# Patient Record
Sex: FEMALE | Race: WHITE | NOT HISPANIC OR LATINO | Employment: PART TIME | URBAN - METROPOLITAN AREA
[De-identification: names, ages, dates, MRNs, and addresses within clinical notes are randomized per-mention and may not be internally consistent; named-entity substitution may affect disease eponyms.]

---

## 2017-10-23 DIAGNOSIS — Z01.812 PRE-OPERATIVE LABORATORY EXAMINATION: ICD-10-CM

## 2017-10-23 LAB
ANION GAP SERPL CALC-SCNC: 12 MMOL/L (ref 0–11.9)
BUN SERPL-MCNC: 18 MG/DL (ref 8–22)
CALCIUM SERPL-MCNC: 9.9 MG/DL (ref 8.5–10.5)
CHLORIDE SERPL-SCNC: 100 MMOL/L (ref 96–112)
CO2 SERPL-SCNC: 25 MMOL/L (ref 20–33)
CREAT SERPL-MCNC: 0.7 MG/DL (ref 0.5–1.4)
EST. AVERAGE GLUCOSE BLD GHB EST-MCNC: 200 MG/DL
GFR SERPL CREATININE-BSD FRML MDRD: >60 ML/MIN/1.73 M 2
GLUCOSE SERPL-MCNC: 196 MG/DL (ref 65–99)
HBA1C MFR BLD: 8.6 % (ref 0–5.6)
POTASSIUM SERPL-SCNC: 3.9 MMOL/L (ref 3.6–5.5)
SODIUM SERPL-SCNC: 137 MMOL/L (ref 135–145)

## 2017-10-23 PROCEDURE — 83036 HEMOGLOBIN GLYCOSYLATED A1C: CPT

## 2017-10-23 PROCEDURE — 80048 BASIC METABOLIC PNL TOTAL CA: CPT

## 2017-10-23 PROCEDURE — 36415 COLL VENOUS BLD VENIPUNCTURE: CPT

## 2017-10-23 RX ORDER — METFORMIN HYDROCHLORIDE 500 MG/1
500 TABLET, EXTENDED RELEASE ORAL 2 TIMES DAILY
Status: ON HOLD | COMMUNITY
End: 2017-10-24

## 2017-10-23 RX ORDER — HYDROXYZINE PAMOATE 50 MG/1
50 CAPSULE ORAL 3 TIMES DAILY PRN
COMMUNITY
End: 2017-11-07

## 2017-10-23 RX ORDER — HYDROCODONE BITARTRATE AND ACETAMINOPHEN 5; 325 MG/1; MG/1
1-2 TABLET ORAL EVERY 4 HOURS PRN
COMMUNITY
End: 2018-05-31

## 2017-10-23 RX ORDER — DOCUSATE SODIUM 100 MG/1
100 CAPSULE, LIQUID FILLED ORAL PRN
COMMUNITY
End: 2018-09-11

## 2017-10-23 NOTE — DISCHARGE PLANNING
DISCHARGE PLANNING NOTE      Procedure: Procedure(s):  FOOT ORIF- FIFTH METATARSAL , STRESS EXAM FOR LISFRAC UNDER ANESTHESIA    Procedure Date: 10/24/2017  Insurance:  Payor: MEDICARE / Plan: MEDICARE PART A & B  Equipment currently available at home? cane and front-wheel walker  Steps into the home? 2-3  Steps within the home? 0  Toilet height? Standard  Type of shower? tub-shower with grab bars  Who will be with you during your recovery? daughter  Is Outpatient Physical Therapy set up after surgery? No      Plan: There are no anticipated discharge needs.

## 2017-10-24 ENCOUNTER — HOSPITAL ENCOUNTER (OUTPATIENT)
Facility: MEDICAL CENTER | Age: 72
End: 2017-10-24
Attending: ORTHOPAEDIC SURGERY | Admitting: ORTHOPAEDIC SURGERY
Payer: MEDICARE

## 2017-10-24 ENCOUNTER — APPOINTMENT (OUTPATIENT)
Dept: RADIOLOGY | Facility: MEDICAL CENTER | Age: 72
End: 2017-10-24
Attending: ORTHOPAEDIC SURGERY
Payer: MEDICARE

## 2017-10-24 VITALS
WEIGHT: 231.04 LBS | SYSTOLIC BLOOD PRESSURE: 140 MMHG | OXYGEN SATURATION: 93 % | DIASTOLIC BLOOD PRESSURE: 71 MMHG | HEIGHT: 70 IN | HEART RATE: 73 BPM | TEMPERATURE: 99.3 F | RESPIRATION RATE: 17 BRPM | BODY MASS INDEX: 33.08 KG/M2

## 2017-10-24 PROBLEM — S92.354A CLOSED NONDISPLACED FRACTURE OF FIFTH RIGHT METATARSAL BONE: Status: ACTIVE | Noted: 2017-10-24

## 2017-10-24 LAB
EKG IMPRESSION: NORMAL
GLUCOSE BLD-MCNC: 176 MG/DL (ref 65–99)

## 2017-10-24 PROCEDURE — 93005 ELECTROCARDIOGRAM TRACING: CPT | Performed by: ORTHOPAEDIC SURGERY

## 2017-10-24 PROCEDURE — 82962 GLUCOSE BLOOD TEST: CPT

## 2017-10-24 PROCEDURE — 700111 HCHG RX REV CODE 636 W/ 250 OVERRIDE (IP)

## 2017-10-24 PROCEDURE — 93010 ELECTROCARDIOGRAM REPORT: CPT | Performed by: INTERNAL MEDICINE

## 2017-10-24 PROCEDURE — 700101 HCHG RX REV CODE 250

## 2017-10-24 RX ORDER — LIDOCAINE AND PRILOCAINE 25; 25 MG/G; MG/G
1 CREAM TOPICAL
Status: DISCONTINUED | OUTPATIENT
Start: 2017-10-24 | End: 2017-10-24 | Stop reason: HOSPADM

## 2017-10-24 RX ORDER — SODIUM CHLORIDE 9 MG/ML
INJECTION, SOLUTION INTRAVENOUS CONTINUOUS
Status: DISCONTINUED | OUTPATIENT
Start: 2017-10-24 | End: 2017-10-24 | Stop reason: HOSPADM

## 2017-10-24 RX ORDER — LIDOCAINE HYDROCHLORIDE 10 MG/ML
0.5 INJECTION, SOLUTION INFILTRATION; PERINEURAL
Status: DISCONTINUED | OUTPATIENT
Start: 2017-10-24 | End: 2017-10-24 | Stop reason: HOSPADM

## 2017-10-24 RX ADMIN — SODIUM CHLORIDE: 9 INJECTION, SOLUTION INTRAVENOUS at 11:08

## 2017-10-24 ASSESSMENT — PAIN SCALES - GENERAL: PAINLEVEL_OUTOF10: 0

## 2017-10-24 NOTE — PROGRESS NOTES
Family elected to delay case for cardiology workup.  DC orders placed, will follow up in my clinic next week.    Curtis Pacheco MD

## 2017-10-24 NOTE — OR NURSING
Pt discharged via personal wheelchair.  All other belongings with pt. Pt/family verbalize understanding.

## 2017-11-02 ENCOUNTER — TELEPHONE (OUTPATIENT)
Dept: CARDIOLOGY | Facility: MEDICAL CENTER | Age: 72
End: 2017-11-02

## 2017-11-02 NOTE — TELEPHONE ENCOUNTER
Spoke with patient; no recent cardiac testing; never seen a cardiologist previously; gave instructions to get to our office as she is coming in from [place]; appt 11/7 with Dr. Araiza, patient thankful for the phone call.

## 2017-11-07 ENCOUNTER — APPOINTMENT (OUTPATIENT)
Dept: MEDICAL GROUP | Facility: MEDICAL CENTER | Age: 72
End: 2017-11-07
Payer: MEDICARE

## 2017-11-07 ENCOUNTER — OFFICE VISIT (OUTPATIENT)
Dept: MEDICAL GROUP | Facility: MEDICAL CENTER | Age: 72
End: 2017-11-07
Payer: MEDICARE

## 2017-11-07 ENCOUNTER — OFFICE VISIT (OUTPATIENT)
Dept: CARDIOLOGY | Facility: MEDICAL CENTER | Age: 72
End: 2017-11-07
Payer: MEDICARE

## 2017-11-07 VITALS
OXYGEN SATURATION: 90 % | WEIGHT: 221 LBS | DIASTOLIC BLOOD PRESSURE: 60 MMHG | HEART RATE: 84 BPM | TEMPERATURE: 98.8 F | SYSTOLIC BLOOD PRESSURE: 98 MMHG | BODY MASS INDEX: 32.73 KG/M2 | HEIGHT: 69 IN | RESPIRATION RATE: 20 BRPM

## 2017-11-07 VITALS
HEIGHT: 69 IN | BODY MASS INDEX: 33.03 KG/M2 | OXYGEN SATURATION: 96 % | HEART RATE: 74 BPM | WEIGHT: 223 LBS | DIASTOLIC BLOOD PRESSURE: 72 MMHG | SYSTOLIC BLOOD PRESSURE: 126 MMHG

## 2017-11-07 DIAGNOSIS — E66.9 OBESITY (BMI 30-39.9): ICD-10-CM

## 2017-11-07 DIAGNOSIS — Z76.89 ESTABLISHING CARE WITH NEW DOCTOR, ENCOUNTER FOR: ICD-10-CM

## 2017-11-07 DIAGNOSIS — E78.5 DYSLIPIDEMIA: ICD-10-CM

## 2017-11-07 DIAGNOSIS — E66.9 DIABETES MELLITUS TYPE 2 IN OBESE (HCC): ICD-10-CM

## 2017-11-07 DIAGNOSIS — R94.31 ABNORMAL EKG: ICD-10-CM

## 2017-11-07 DIAGNOSIS — E11.69 DIABETES MELLITUS TYPE 2 IN OBESE (HCC): ICD-10-CM

## 2017-11-07 DIAGNOSIS — K21.9 GASTROESOPHAGEAL REFLUX DISEASE WITHOUT ESOPHAGITIS: ICD-10-CM

## 2017-11-07 PROCEDURE — 99203 OFFICE O/P NEW LOW 30 MIN: CPT | Performed by: INTERNAL MEDICINE

## 2017-11-07 PROCEDURE — 99204 OFFICE O/P NEW MOD 45 MIN: CPT | Performed by: INTERNAL MEDICINE

## 2017-11-07 ASSESSMENT — ENCOUNTER SYMPTOMS
FEVER: 0
FALLS: 1
DEPRESSION: 0
WHEEZING: 0
MYALGIAS: 0
BLURRED VISION: 0
EYE PAIN: 0
EYE DISCHARGE: 0
HEMOPTYSIS: 0
CHILLS: 0
VOMITING: 0
NERVOUS/ANXIOUS: 0
HEARTBURN: 1
BACK PAIN: 1
LOSS OF CONSCIOUSNESS: 0
SPEECH CHANGE: 0
PALPITATIONS: 0
NAUSEA: 0
COUGH: 1
BRUISES/BLEEDS EASILY: 0
ABDOMINAL PAIN: 0

## 2017-11-07 ASSESSMENT — PAIN SCALES - GENERAL: PAINLEVEL: NO PAIN

## 2017-11-07 ASSESSMENT — PATIENT HEALTH QUESTIONNAIRE - PHQ9: CLINICAL INTERPRETATION OF PHQ2 SCORE: 0

## 2017-11-07 NOTE — LETTER
[facility]  Nando Moyer M.D.  [address]  [facility] [address]  Fax: [phone]   Authorization for Release/Disclosure of   Protected Health Information   Name: NANDO PALM : 1945 SSN: [id number]   Address: [address] [place] [id number] Phone:    [phone] (home)    I authorize the entity listed below to release/disclose the PHI below to:   [facility]/Nando Moyer M.D. and Nando Moyer M.D.   Provider or Entity Name:     Address   City, State, Zip   Phone:      Fax:     Reason for request: continuity of care   Information to be released:    [  ] LAST COLONOSCOPY,  including any PATH REPORT and follow-up  [  ] LAST FIT/COLOGUARD RESULT [  ] LAST DEXA  [  ] LAST MAMMOGRAM  [  ] LAST PAP  [  ] LAST LABS [  ] RETINA EXAM REPORT  [  ] IMMUNIZATION RECORDS  [  ] Release all info      [  ] Check here and initial the line next to each item to release ALL health information INCLUDING  _____ Care and treatment for drug and / or alcohol abuse  _____ HIV testing, infection status, or AIDS  _____ Genetic Testing    DATES OF SERVICE OR TIME PERIOD TO BE DISCLOSED: _____________  I understand and acknowledge that:  * This Authorization may be revoked at any time by you in writing, except if your health information has already been used or disclosed.  * Your health information that will be used or disclosed as a result of you signing this authorization could be re-disclosed by the recipient. If this occurs, your re-disclosed health information may no longer be protected by State or Federal laws.  * You may refuse to sign this Authorization. Your refusal will not affect your ability to obtain treatment.  * This Authorization becomes effective upon signing and will  on (date) __________.      If no date is indicated, this Authorization will  one (1) year from the signature date.    Name: Nando Palm    Signature:   Date:     2017       PLEASE  FAX REQUESTED RECORDS BACK TO: [phone]

## 2017-11-07 NOTE — LETTER
[facility]   [address]  [place] [address]  Phone: [phone]  Fax: [phone]              Lucero Palm  1945    Encounter Date: 11/7/2017    Glynn Araiza M.D.          PROGRESS NOTE:  Subjective:   Lucero Palm is a 72 y.o. female who presents today For new patient evaluation because of an abnormal EKG. She underwent preoperative evaluation for foot surgery. She is found to have an abnormal EKG and was therefore referred for cardiac evaluation.    She has no history of any prior cardiac history or cardiac event. She denies any chest discomfort, dyspnea on exertion, PND or orthopnea. She's noted no edema, palpitations or lightheadedness.    Her EKG showed inferior Q waves and possible prior inferior myocardial infarction.    Past Medical History:   Diagnosis Date   • Arthritis     foot, left thumb   • Cataract     kimberley IOL   • Diabetes (CMS-[facility])     oral medication   • GERD (gastroesophageal reflux disease)    • High cholesterol    • Pain     right foot     Past Surgical History:   Procedure Laterality Date   • LITHOTRIPSY  2015   • OTHER  2011    D&C   • INGUINAL HERNIA REPAIR Left 2009   • OTHER  1972    back surgery     Family History   Problem Relation Age of Onset   • Heart Disease Neg Hx      History   Smoking Status   • Never Smoker   Smokeless Tobacco   • Never Used     No Known Allergies  Outpatient Encounter Prescriptions as of 11/7/2017   Medication Sig Dispense Refill   • Linagliptin-Metformin HCl (JENTADUETO) 2.5-1000 MG Tab Take 1 Tab by mouth every morning.     • hydrocodone-acetaminophen (NORCO) 5-325 MG Tab per tablet Take 1-2 Tabs by mouth every four hours as needed.     • hydrOXYzine pamoate (VISTARIL) 50 MG Cap Take 50 mg by mouth 3 times a day as needed for Itching.     • docusate sodium (COLACE) 100 MG Cap Take 100 mg by mouth as needed for Constipation.     • Specialty Vitamins Products (YUMIKO-RX DIABETIC  "VITAMIN PO) Take 1 Tab by mouth 2 Times a Day.       No facility-administered encounter medications on file as of 11/7/2017.      Review of Systems   Constitutional: Negative for chills and fever.   HENT: Negative for congestion.    Eyes: Negative for blurred vision, pain and discharge.   Respiratory: Positive for cough. Negative for hemoptysis and wheezing.    Cardiovascular: Negative for chest pain and palpitations.   Gastrointestinal: Positive for heartburn. Negative for abdominal pain, nausea and vomiting.   Musculoskeletal: Positive for back pain and falls. Negative for joint pain and myalgias.   Skin: Negative for itching and rash.   Neurological: Negative for speech change and loss of consciousness.   Endo/Heme/Allergies: Does not bruise/bleed easily.   Psychiatric/Behavioral: Negative for depression. The patient is not nervous/anxious.    All other systems reviewed and are negative.       Objective:   /72   Pulse 74   Ht 1.753 m (5' 9\")   Wt 101.2 kg (223 lb)   SpO2 96%   BMI 32.93 kg/m²      Physical Exam   Constitutional: She is oriented to person, place, and time. She appears well-developed. No distress.   HENT:   Mouth/Throat: Mucous membranes are normal.   Eyes: Conjunctivae and EOM are normal.   Neck: No JVD present. No tracheal deviation present. No thyroid mass and no thyromegaly present.   Cardiovascular: Normal rate, regular rhythm and intact distal pulses.    No murmur heard.  Pulmonary/Chest: Effort normal and breath sounds normal. No respiratory distress. She exhibits no tenderness.   Abdominal: Soft. There is no tenderness.   Musculoskeletal: Normal range of motion. She exhibits no edema.   Neurological: She is alert and oriented to person, place, and time. She has normal strength. She displays no tremor.   Skin: Skin is warm and dry. She is not diaphoretic.   Psychiatric: She has a normal mood and affect. Her behavior is normal.   Vitals reviewed.    No results found for: " CHOLSTRLTOT, LDL, HDL, TRIGLYCERIDE    Lab Results   Component Value Date/Time    SODIUM 137 10/23/2017 03:17 PM    POTASSIUM 3.9 10/23/2017 03:17 PM    CHLORIDE 100 10/23/2017 03:17 PM    CO2 25 10/23/2017 03:17 PM    GLUCOSE 196 (H) 10/23/2017 03:17 PM    BUN 18 10/23/2017 03:17 PM    CREATININE 0.70 10/23/2017 03:17 PM     No results found for: ALKPHOSPHAT, ASTSGOT, ALTSGPT, TBILIRUBIN   No results found for: BNPBTYPENAT     EKG from October 24, 2017: This shows a normal sinus rhythm with possible inferior infarction of indeterminate age and is otherwise unremarkable.    Assessment:     1. Abnormal EKG     2. Dyslipidemia         Medical Decision Making:  Today's Assessment / Status / Plan:     Ms. Palm is clinically stable. She is asymptomatic from a cardiovascular standpoint. She does have a borderline abnormal EKG. We discussed the diagnostic possibilities. She is agreeable to proceed to a echocardiogram and cardiac CT scan for calcium scoring. She will follow-up in a couple weeks. She will have a lipid panel prior to her follow-up appointment      Lucero Moyer M.D.  [address]  [place] [address]  VIA In Basket

## 2017-11-07 NOTE — PROGRESS NOTES
Subjective:   Lucero Palm is a 72 y.o. female who presents today for new patient evaluation because of an abnormal EKG. She underwent preoperative evaluation for foot surgery. She is found to have an abnormal EKG and was therefore referred for cardiac evaluation.    She has no history of any prior cardiac history or cardiac event. She denies any chest discomfort, dyspnea on exertion, PND or orthopnea. She's noted no edema, palpitations or lightheadedness.    Her EKG showed inferior Q waves and possible prior inferior myocardial infarction.    Past Medical History:   Diagnosis Date   • Arthritis     foot, left thumb   • Cataract     kimberley IOL   • Diabetes (CMS-HCC)     oral medication   • GERD (gastroesophageal reflux disease)    • High cholesterol    • Pain     right foot     Past Surgical History:   Procedure Laterality Date   • LITHOTRIPSY  2015   • OTHER  2011    D&C   • INGUINAL HERNIA REPAIR Left 2009   • OTHER  1972    back surgery     Family History   Problem Relation Age of Onset   • Heart Disease Neg Hx      History   Smoking Status   • Never Smoker   Smokeless Tobacco   • Never Used     No Known Allergies  Outpatient Encounter Prescriptions as of 11/7/2017   Medication Sig Dispense Refill   • Linagliptin-Metformin HCl (JENTADUETO) 2.5-1000 MG Tab Take 1 Tab by mouth every morning.     • hydrocodone-acetaminophen (NORCO) 5-325 MG Tab per tablet Take 1-2 Tabs by mouth every four hours as needed.     • hydrOXYzine pamoate (VISTARIL) 50 MG Cap Take 50 mg by mouth 3 times a day as needed for Itching.     • docusate sodium (COLACE) 100 MG Cap Take 100 mg by mouth as needed for Constipation.     • Specialty Vitamins Products (YUMIKO-RX DIABETIC VITAMIN PO) Take 1 Tab by mouth 2 Times a Day.       No facility-administered encounter medications on file as of 11/7/2017.      Review of Systems   Constitutional: Negative for chills and fever.   HENT: Negative for congestion.    Eyes: Negative for blurred vision,  "pain and discharge.   Respiratory: Positive for cough. Negative for hemoptysis and wheezing.    Cardiovascular: Negative for chest pain and palpitations.   Gastrointestinal: Positive for heartburn. Negative for abdominal pain, nausea and vomiting.   Musculoskeletal: Positive for back pain and falls. Negative for joint pain and myalgias.   Skin: Negative for itching and rash.   Neurological: Negative for speech change and loss of consciousness.   Endo/Heme/Allergies: Does not bruise/bleed easily.   Psychiatric/Behavioral: Negative for depression. The patient is not nervous/anxious.    All other systems reviewed and are negative.       Objective:   /72   Pulse 74   Ht 1.753 m (5' 9\")   Wt 101.2 kg (223 lb)   SpO2 96%   BMI 32.93 kg/m²     Physical Exam   Constitutional: She is oriented to person, place, and time. She appears well-developed. No distress.   HENT:   Mouth/Throat: Mucous membranes are normal.   Eyes: Conjunctivae and EOM are normal.   Neck: No JVD present. No tracheal deviation present. No thyroid mass and no thyromegaly present.   Cardiovascular: Normal rate, regular rhythm and intact distal pulses.    No murmur heard.  Pulmonary/Chest: Effort normal and breath sounds normal. No respiratory distress. She exhibits no tenderness.   Abdominal: Soft. There is no tenderness.   Musculoskeletal: Normal range of motion. She exhibits no edema.   Neurological: She is alert and oriented to person, place, and time. She has normal strength. She displays no tremor.   Skin: Skin is warm and dry. She is not diaphoretic.   Psychiatric: She has a normal mood and affect. Her behavior is normal.   Vitals reviewed.    No results found for: CHOLSTRLTOT, LDL, HDL, TRIGLYCERIDE    Lab Results   Component Value Date/Time    SODIUM 137 10/23/2017 03:17 PM    POTASSIUM 3.9 10/23/2017 03:17 PM    CHLORIDE 100 10/23/2017 03:17 PM    CO2 25 10/23/2017 03:17 PM    GLUCOSE 196 (H) 10/23/2017 03:17 PM    BUN 18 10/23/2017 " 03:17 PM    CREATININE 0.70 10/23/2017 03:17 PM     No results found for: ALKPHOSPHAT, ASTSGOT, ALTSGPT, TBILIRUBIN   No results found for: BNPBTYPENAT     EKG from October 24, 2017: This shows a normal sinus rhythm with possible inferior infarction of indeterminate age and is otherwise unremarkable.    Assessment:     1. Abnormal EKG     2. Dyslipidemia         Medical Decision Making:  Today's Assessment / Status / Plan:     Ms. Palm is clinically stable. She is asymptomatic from a cardiovascular standpoint. She does have a borderline abnormal EKG. We discussed the diagnostic possibilities. She is agreeable to proceed to a echocardiogram and cardiac CT scan for calcium scoring. She will follow-up in a couple weeks. She will have a lipid panel prior to her follow-up appointment

## 2017-11-07 NOTE — PROGRESS NOTES
Chief Complaint   Patient presents with   • New Patient     general exam     Chief complaint: Diabetes mellitus, GERD      HISTORY OF PRESENT ILLNESS: Patient is a 72 y.o. female patient who presents today to discuss the evaluation and management of:          1. Establishing care with new doctor, encounter for    Patient was previously followed by Dr. Zhao in [place]. She never got to see the physician however it was always seen by a nurse practitioner. Patient recently fractured her right foot. Part of her preop workup was EKG which was abnormal therefore she saw cardiology. She is scheduled for a coronary artery calcium scan, and echocardiogram.      2. Diabetes mellitus type 2 in obese (CMS-HCC)    Patient was diagnosed with diabetes about 2 years ago. Initially she was placed on metformin 1000 mg a day. A recent hemoglobin A1c returned at 8.6 and she was changed to combination metformin and linagliptin. She is trying to cut down on the sweets in her diet. She is not currently doing any aerobic exercise.    3. Gastroesophageal reflux disease without esophagitis    Patient has a history of GERD. She has very mild cough where she has to clear her throat of some phlegm that collects. She is not currently taking any acid blocker, although she does have a prescription for famotidine.    4. Obesity (BMI 30-39.9)    Patient's weight has been stable at about 225 pounds for the last several years. As above she is not following a calorie restricted diet nor is she regularly exercising. She is limited currently due to her fractured right foot.        Patient Active Problem List    Diagnosis Date Noted   • Abnormal EKG 11/07/2017   • Dyslipidemia 11/07/2017   • Diabetes mellitus type 2 in obese (CMS-HCC) 11/07/2017   • Gastroesophageal reflux disease without esophagitis 11/07/2017   • Obesity (BMI 30-39.9) 11/07/2017   • Closed nondisplaced fracture of fifth right metatarsal bone 10/24/2017        Allergies:Review of  "patient's allergies indicates no known allergies.    Current meds including changes today  Current Outpatient Prescriptions   Medication Sig Dispense Refill   • hydrocodone-acetaminophen (NORCO) 5-325 MG Tab per tablet Take 1-2 Tabs by mouth every four hours as needed.     • Linagliptin-Metformin HCl (JENTADUETO) 2.5-1000 MG Tab Take 1 Tab by mouth every morning.     • docusate sodium (COLACE) 100 MG Cap Take 100 mg by mouth as needed for Constipation.     • Specialty Vitamins Products (YUMIKO-RX DIABETIC VITAMIN PO) Take 1 Tab by mouth 2 Times a Day.       No current facility-administered medications for this visit.      Social History   Substance Use Topics   • Smoking status: Never Smoker   • Smokeless tobacco: Never Used   • Alcohol use 0.6 oz/week     1 Glasses of wine per week      Comment: 2-3 per month     Social History     Social History Narrative   • No narrative on file       Family History   Problem Relation Age of Onset   • Breast Cancer Mother 42   • Breast Cancer Sister    • Heart Disease Neg Hx        Review of Systems:  No chest pain, No shortness of breath, No dyspnea on exertion  Gastrointestinal ROS: No abdominal pain, No nausea, vomiting, diarrhea, or constipation        Exam:      Blood pressure (!) 98/60, pulse 84, temperature 37.1 °C (98.8 °F), resp. rate 20, height 1.753 m (5' 9\"), weight 100.2 kg (221 lb), SpO2 90 %, not currently breastfeeding.  Repeat blood pressure 126/72 by myself.  General:  Well nourished, well developed female in NAD affect and mood within normal limits  Head is grossly normal.  Neck: Supple without adenopathy  Pulmonary: Clear to ausculation.  Normal effort. No rales, rhonchi, or wheezing.  Cardiovascular: Regular rate and rhythm without murmur.   Extremities: no clubbing, cyanosis, or edema.  Neuro: moves all extremities symmetrically    Please note that this dictation was created using voice recognition software. I have made every reasonable attempt to correct " obvious errors, but I expect that there are errors of grammar and possibly content that I did not discover before finalizing the note.    Assessment/Plan:  1. Establishing care with new doctor, encounter for    Patient gets her mammograms done in [place], she is due for one. She would like to continue getting them through her previous provider. We are going to obtain records.    2. Diabetes mellitus type 2 in obese (CMS-[facility])    Strongly encouraged diet and aerobic exercise 150 minutes per week with goal of losing about 20 pounds. We'll check hemoglobin A1c prior to her next visit in 3 months.  - HEMOGLOBIN A1C; Future  - COMP METABOLIC PANEL; Future    3. Gastroesophageal reflux disease without esophagitis    Stable, patient may take her famotidine if she desires. She only has occasional heartburn.    4. Obesity (BMI 30-39.9)      - Patient identified as having weight management issue.  Appropriate orders and counseling given.    Followup: Return in about 3 months (around 2/7/2018).

## 2017-11-16 ENCOUNTER — HOSPITAL ENCOUNTER (OUTPATIENT)
Dept: LAB | Facility: MEDICAL CENTER | Age: 72
End: 2017-11-16
Attending: INTERNAL MEDICINE
Payer: MEDICARE

## 2017-11-16 ENCOUNTER — HOSPITAL ENCOUNTER (OUTPATIENT)
Dept: CARDIOLOGY | Facility: MEDICAL CENTER | Age: 72
End: 2017-11-16
Attending: INTERNAL MEDICINE
Payer: MEDICARE

## 2017-11-16 ENCOUNTER — HOSPITAL ENCOUNTER (OUTPATIENT)
Dept: RADIOLOGY | Facility: MEDICAL CENTER | Age: 72
End: 2017-11-16
Attending: INTERNAL MEDICINE
Payer: COMMERCIAL

## 2017-11-16 DIAGNOSIS — E78.5 DYSLIPIDEMIA: ICD-10-CM

## 2017-11-16 DIAGNOSIS — R94.31 ABNORMAL EKG: ICD-10-CM

## 2017-11-16 LAB
CHOLEST SERPL-MCNC: 233 MG/DL (ref 100–199)
HDLC SERPL-MCNC: 51 MG/DL
LDLC SERPL CALC-MCNC: 135 MG/DL
TRIGL SERPL-MCNC: 234 MG/DL (ref 0–149)

## 2017-11-16 PROCEDURE — 36415 COLL VENOUS BLD VENIPUNCTURE: CPT

## 2017-11-16 PROCEDURE — 4410556 CT-CARDIAC SCORING

## 2017-11-16 PROCEDURE — 80061 LIPID PANEL: CPT

## 2017-11-16 PROCEDURE — 93306 TTE W/DOPPLER COMPLETE: CPT

## 2017-11-18 LAB
LV EJECT FRACT  99904: 65
LV EJECT FRACT MOD 2C 99903: 69.33
LV EJECT FRACT MOD 4C 99902: 62.99
LV EJECT FRACT MOD BP 99901: 65.24

## 2017-11-28 ENCOUNTER — OFFICE VISIT (OUTPATIENT)
Dept: CARDIOLOGY | Facility: MEDICAL CENTER | Age: 72
End: 2017-11-28
Payer: MEDICARE

## 2017-11-28 VITALS
BODY MASS INDEX: 32.58 KG/M2 | WEIGHT: 220 LBS | HEART RATE: 82 BPM | OXYGEN SATURATION: 94 % | SYSTOLIC BLOOD PRESSURE: 126 MMHG | DIASTOLIC BLOOD PRESSURE: 60 MMHG | HEIGHT: 69 IN

## 2017-11-28 DIAGNOSIS — E78.5 DYSLIPIDEMIA: ICD-10-CM

## 2017-11-28 DIAGNOSIS — R94.31 ABNORMAL EKG: ICD-10-CM

## 2017-11-28 PROCEDURE — 99214 OFFICE O/P EST MOD 30 MIN: CPT | Performed by: INTERNAL MEDICINE

## 2017-11-28 RX ORDER — ATORVASTATIN CALCIUM 20 MG/1
20 TABLET, FILM COATED ORAL DAILY
Qty: 30 TAB | Refills: 11 | Status: SHIPPED | OUTPATIENT
Start: 2017-11-28 | End: 2018-05-16 | Stop reason: SDUPTHER

## 2017-11-28 NOTE — LETTER
[facility]   [address]  [place] [address]  Phone: [phone]  Fax: [phone]              Lucero Palm  1945    Encounter Date: 11/28/2017    Glynn Araiza M.D.          PROGRESS NOTE:  Subjective:   Lucero Palm is a 72 y.o. female who presents today for follow-upevaluation because of an abnormal EKG. She underwent preoperative evaluation for foot surgery. She is found to have an abnormal EKG and was therefore referred for cardiac evaluation. She has had an echocardiogram and cardiac CT scan for calcium scoring.    She has had no chest discomfort, dyspnea, edema, palpitations or lightheadedness.    Past Medical History:   Diagnosis Date   • Arthritis     foot, left thumb   • Cataract     kimberley IOL   • Diabetes (CMS-HCC)     oral medication   • GERD (gastroesophageal reflux disease)    • High cholesterol    • Pain     right foot     Past Surgical History:   Procedure Laterality Date   • LITHOTRIPSY  2015   • OTHER  2011    D&C   • INGUINAL HERNIA REPAIR Left 2009   • OTHER  1972    back surgery     Family History   Problem Relation Age of Onset   • Breast Cancer Mother 42   • Breast Cancer Sister    • Heart Disease Neg Hx      History   Smoking Status   • Never Smoker   Smokeless Tobacco   • Never Used     No Known Allergies  Outpatient Encounter Prescriptions as of 11/28/2017   Medication Sig Dispense Refill   • Linagliptin-Metformin HCl (JENTADUETO) 2.5-1000 MG Tab Take 1 Tab by mouth every morning.     • Specialty Vitamins Products (YUMIKO-RX DIABETIC VITAMIN PO) Take 1 Tab by mouth 2 Times a Day.     • hydrocodone-acetaminophen (NORCO) 5-325 MG Tab per tablet Take 1-2 Tabs by mouth every four hours as needed.     • docusate sodium (COLACE) 100 MG Cap Take 100 mg by mouth as needed for Constipation.       No facility-administered encounter medications on file as of 11/28/2017.      ROS     Objective:   /60   Pulse 82    "Ht 1.753 m (5' 9\")   Wt 99.8 kg (220 lb)   SpO2 94%   BMI 32.49 kg/m²      Physical Exam   Neck: No JVD present.   Cardiovascular: Normal rate and regular rhythm.  Exam reveals no gallop.    No murmur heard.  Pulmonary/Chest: Effort normal. She has no rales.   Abdominal: Soft. There is no tenderness.   Musculoskeletal: She exhibits no edema.     EKG from October 24, 2017: This shows a normal sinus rhythm with possible inferior infarction of indeterminate age and is otherwise unremarkable.    Echocardiogram:  CONCLUSIONS  No prior study is available for comparison.   The left ventricle was normal in size.  Mild concentric left ventricular hypertrophy.  Left ventricular ejection fraction is visually estimated to be 65%.  Moderately dilated right ventricle.  Enlarged right atrium.  Trace aortic insufficiency.  Mild tricuspid regurgitation.  Mild pulmonic insufficiency.  Normal pericardium without effusion.    Exam Date:         11/16/2017    Coronary calcium score:  Coronary calcification:  LMA - 0.0  LCX - 0.0  LAD - 88.8  RCA - 57.8  PDA - 0.0    Calcium score:  146.6    Lab Results   Component Value Date/Time    CHOLSTRLTOT 233 (H) 11/16/2017 09:16 AM     (H) 11/16/2017 09:16 AM    HDL 51 11/16/2017 09:16 AM    TRIGLYCERIDE 234 (H) 11/16/2017 09:16 AM       Lab Results   Component Value Date/Time    SODIUM 137 10/23/2017 03:17 PM    POTASSIUM 3.9 10/23/2017 03:17 PM    CHLORIDE 100 10/23/2017 03:17 PM    CO2 25 10/23/2017 03:17 PM    GLUCOSE 196 (H) 10/23/2017 03:17 PM    BUN 18 10/23/2017 03:17 PM    CREATININE 0.70 10/23/2017 03:17 PM     No results found for: ALKPHOSPHAT, ASTSGOT, ALTSGPT, TBILIRUBIN   No results found for: BNPBTYPENAT       Assessment:     1. Abnormal EKG     2. Dyslipidemia         Medical Decision Making:  Today's Assessment / Status / Plan:     Ms. Palm is clinically stable. She does have an abnormal EKG but no evidence of a prior myocardial infarction on her echocardiogram. Her " coronary calcium score is moderately elevated and she does have dyslipidemia. At this time, we will start her on atorvastatin 20 mg daily. She'll have lab work in 6 weeks and prior to follow-up in 6 months. If she needs foot surgery, I feel she can proceed to this without any further evaluation. Again she is asymptomatic and has normal left ventricular function.      Lucero Moyer M.D.  [address]  [address]  [facility] [address]  VIA In Basket

## 2017-11-28 NOTE — PROGRESS NOTES
"Subjective:   Lucero Paml is a 72 y.o. female who presents today for follow-upevaluation because of an abnormal EKG. She underwent preoperative evaluation for foot surgery. She is found to have an abnormal EKG and was therefore referred for cardiac evaluation. She has had an echocardiogram and cardiac CT scan for calcium scoring.    She has had no chest discomfort, dyspnea, edema, palpitations or lightheadedness.    Past Medical History:   Diagnosis Date   • Arthritis     foot, left thumb   • Cataract     kimberley IOL   • Diabetes (CMS-HCC)     oral medication   • GERD (gastroesophageal reflux disease)    • High cholesterol    • Pain     right foot     Past Surgical History:   Procedure Laterality Date   • LITHOTRIPSY  2015   • OTHER  2011    D&C   • INGUINAL HERNIA REPAIR Left 2009   • OTHER  1972    back surgery     Family History   Problem Relation Age of Onset   • Breast Cancer Mother 42   • Breast Cancer Sister    • Heart Disease Neg Hx      History   Smoking Status   • Never Smoker   Smokeless Tobacco   • Never Used     No Known Allergies  Outpatient Encounter Prescriptions as of 11/28/2017   Medication Sig Dispense Refill   • Linagliptin-Metformin HCl (JENTADUETO) 2.5-1000 MG Tab Take 1 Tab by mouth every morning.     • Specialty Vitamins Products (YUMIKO-RX DIABETIC VITAMIN PO) Take 1 Tab by mouth 2 Times a Day.     • hydrocodone-acetaminophen (NORCO) 5-325 MG Tab per tablet Take 1-2 Tabs by mouth every four hours as needed.     • docusate sodium (COLACE) 100 MG Cap Take 100 mg by mouth as needed for Constipation.       No facility-administered encounter medications on file as of 11/28/2017.      ROS     Objective:   /60   Pulse 82   Ht 1.753 m (5' 9\")   Wt 99.8 kg (220 lb)   SpO2 94%   BMI 32.49 kg/m²     Physical Exam   Neck: No JVD present.   Cardiovascular: Normal rate and regular rhythm.  Exam reveals no gallop.    No murmur heard.  Pulmonary/Chest: Effort normal. She has no rales. "   Abdominal: Soft. There is no tenderness.   Musculoskeletal: She exhibits no edema.     EKG from October 24, 2017: This shows a normal sinus rhythm with possible inferior infarction of indeterminate age and is otherwise unremarkable.    Echocardiogram:  CONCLUSIONS  No prior study is available for comparison.   The left ventricle was normal in size.  Mild concentric left ventricular hypertrophy.  Left ventricular ejection fraction is visually estimated to be 65%.  Moderately dilated right ventricle.  Enlarged right atrium.  Trace aortic insufficiency.  Mild tricuspid regurgitation.  Mild pulmonic insufficiency.  Normal pericardium without effusion.    Exam Date:         11/16/2017    Coronary calcium score:  Coronary calcification:  LMA - 0.0  LCX - 0.0  LAD - 88.8  RCA - 57.8  PDA - 0.0    Calcium score:  146.6    Lab Results   Component Value Date/Time    CHOLSTRLTOT 233 (H) 11/16/2017 09:16 AM     (H) 11/16/2017 09:16 AM    HDL 51 11/16/2017 09:16 AM    TRIGLYCERIDE 234 (H) 11/16/2017 09:16 AM       Lab Results   Component Value Date/Time    SODIUM 137 10/23/2017 03:17 PM    POTASSIUM 3.9 10/23/2017 03:17 PM    CHLORIDE 100 10/23/2017 03:17 PM    CO2 25 10/23/2017 03:17 PM    GLUCOSE 196 (H) 10/23/2017 03:17 PM    BUN 18 10/23/2017 03:17 PM    CREATININE 0.70 10/23/2017 03:17 PM     No results found for: ALKPHOSPHAT, ASTSGOT, ALTSGPT, TBILIRUBIN   No results found for: BNPBTYPENAT       Assessment:     1. Abnormal EKG     2. Dyslipidemia         Medical Decision Making:  Today's Assessment / Status / Plan:     Ms. Palm is clinically stable. She does have an abnormal EKG but no evidence of a prior myocardial infarction on her echocardiogram. Her coronary calcium score is moderately elevated and she does have dyslipidemia. At this time, we will start her on atorvastatin 20 mg daily. She'll have lab work in 6 weeks and prior to follow-up in 6 months. If she needs foot surgery, I feel she can proceed to this  without any further evaluation. Again she is asymptomatic and has normal left ventricular function.

## 2018-02-13 ENCOUNTER — APPOINTMENT (OUTPATIENT)
Dept: MEDICAL GROUP | Facility: MEDICAL CENTER | Age: 73
End: 2018-02-13
Payer: MEDICARE

## 2018-05-15 ENCOUNTER — PATIENT MESSAGE (OUTPATIENT)
Dept: MEDICAL GROUP | Facility: MEDICAL CENTER | Age: 73
End: 2018-05-15

## 2018-05-16 ENCOUNTER — OFFICE VISIT (OUTPATIENT)
Dept: MEDICAL GROUP | Facility: MEDICAL CENTER | Age: 73
End: 2018-05-16
Payer: MEDICARE

## 2018-05-16 VITALS
BODY MASS INDEX: 34.81 KG/M2 | TEMPERATURE: 98.3 F | DIASTOLIC BLOOD PRESSURE: 78 MMHG | WEIGHT: 235.01 LBS | SYSTOLIC BLOOD PRESSURE: 128 MMHG | HEIGHT: 69 IN | HEART RATE: 80 BPM | OXYGEN SATURATION: 96 %

## 2018-05-16 DIAGNOSIS — E78.5 DYSLIPIDEMIA: ICD-10-CM

## 2018-05-16 DIAGNOSIS — M89.9 OSTEOPATHY: ICD-10-CM

## 2018-05-16 DIAGNOSIS — E66.9 DIABETES MELLITUS TYPE 2 IN OBESE (HCC): ICD-10-CM

## 2018-05-16 DIAGNOSIS — Z12.11 ENCOUNTER FOR COLORECTAL CANCER SCREENING: ICD-10-CM

## 2018-05-16 DIAGNOSIS — E11.69 DIABETES MELLITUS TYPE 2 IN OBESE (HCC): ICD-10-CM

## 2018-05-16 DIAGNOSIS — Z12.12 ENCOUNTER FOR COLORECTAL CANCER SCREENING: ICD-10-CM

## 2018-05-16 LAB
HBA1C MFR BLD: 7.9 % (ref ?–5.8)
INT CON NEG: NEGATIVE
INT CON POS: POSITIVE

## 2018-05-16 PROCEDURE — 99214 OFFICE O/P EST MOD 30 MIN: CPT | Performed by: PHYSICIAN ASSISTANT

## 2018-05-16 PROCEDURE — 83036 HEMOGLOBIN GLYCOSYLATED A1C: CPT | Performed by: PHYSICIAN ASSISTANT

## 2018-05-16 RX ORDER — IBUPROFEN 200 MG
200 TABLET ORAL EVERY 6 HOURS PRN
COMMUNITY
End: 2018-05-31

## 2018-05-16 RX ORDER — ASPIRIN 81 MG/1
81 TABLET, CHEWABLE ORAL DAILY
COMMUNITY
End: 2018-09-11

## 2018-05-16 RX ORDER — IBUPROFEN AND FAMOTIDINE 26.6; 8 MG/1; MG/1
TABLET ORAL
COMMUNITY
End: 2018-05-31

## 2018-05-16 RX ORDER — ATORVASTATIN CALCIUM 20 MG/1
20 TABLET, FILM COATED ORAL DAILY
Qty: 90 TAB | Refills: 3 | Status: SHIPPED | OUTPATIENT
Start: 2018-05-16 | End: 2019-05-28 | Stop reason: SDUPTHER

## 2018-05-16 NOTE — LETTER
[facility]  Lucero Moyer M.D.  [address]  [facility] [address]  Fax: [phone]   Authorization for Release/Disclosure of   Protected Health Information   Name: LUCERO PRITCHARD : 1945 SSN: [id number]   Address: [address] [place] [address] Phone:    [phone] (home)    I authorize the entity listed below to release/disclose the PHI below to:   [facility]/Lucero Moyer M.D. and Jeannie Pantoja P.A.-C.   Provider or Entity Name: Joni Blakely D.O.     Address   [facility], [place], [place]   Phone:      Fax:  [phone]   Reason for request: continuity of care   Information to be released:    [  ] LAST COLONOSCOPY,  including any PATH REPORT and follow-up  [  ] LAST FIT/COLOGUARD RESULT [  ] LAST DEXA  [  ] LAST MAMMOGRAM  [  ] LAST PAP  [  ] LAST LABS [xxx  ] RETINA EXAM REPORT  [  ] IMMUNIZATION RECORDS  [  ] Release all info      [  ] Check here and initial the line next to each item to release ALL health information INCLUDING  _____ Care and treatment for drug and / or alcohol abuse  _____ HIV testing, infection status, or AIDS  _____ Genetic Testing    DATES OF SERVICE OR TIME PERIOD TO BE DISCLOSED: _____________  I understand and acknowledge that:  * This Authorization may be revoked at any time by you in writing, except if your health information has already been used or disclosed.  * Your health information that will be used or disclosed as a result of you signing this authorization could be re-disclosed by the recipient. If this occurs, your re-disclosed health information may no longer be protected by State or Federal laws.  * You may refuse to sign this Authorization. Your refusal will not affect your ability to obtain treatment.  * This Authorization becomes effective upon signing and will  on (date) __________.      If no date is indicated, this Authorization will  one (1) year from the signature date.    Name:  Lucero Plam    Signature:   Date:     5/16/2018       PLEASE FAX REQUESTED RECORDS BACK TO: [phone]

## 2018-05-16 NOTE — PROGRESS NOTES
Subjective:   CC:   Chief Complaint   Patient presents with   • Diabetes Mellitus     Follow up       Lucero Palm is a 72 y.o. female here today for follow-up:    Diabetes mellitus type 2 in obese (CMS-HCC) ([facility])  Patient is here to follow up with diabetes which is a new problem to me. Currently she takes metformin extended release 1000 twice daily, Jardiance 25 MG daily, and trajenta 5 MG daily. States metformin causes abdominal discomfort and diarrhea on a daily basis. This is very uncomfortable and inconvenient. Patient has been taking her BG once daily , sometimes fasting sometimes before lunch sometimes before dinner. Numbers averaging from 170- 190.  Describes her diet as healthy, she awaits sugary treats and carbohydrate such as breads. Today in office her A1c is 7.9.   She gets retinal exam at the [facility]. Last eye exam within the last year.    Dyslipidemia  Currently on Lipitor 25 mg qd w/o any SE or mylagia.  Denies SOP, CP          Current medicines (including changes today)  Current Outpatient Prescriptions   Medication Sig Dispense Refill   • metFORMIN (GLUCOPHAGE) 500 MG Tab Take 1,000 mg by mouth 2 times a day, with meals.     • Empagliflozin (JARDIANCE) 25 MG Tab Take  by mouth.     • linagliptin (TRADJENTA) 5 MG Tab tablet Take 5 mg by mouth every day.     • aspirin (ASA) 81 MG Chew Tab chewable tablet Take 81 mg by mouth every day.     • Ibuprofen-Famotidine (DUEXIS) 800-26.6 MG Tab Take  by mouth.     • ibuprofen (ADVIL) 200 MG Tab Take 200 mg by mouth every 6 hours as needed.     • atorvastatin (LIPITOR) 20 MG Tab Take 1 Tab by mouth every day. 90 Tab 3   • Specialty Vitamins Products (YUMIKO-RX DIABETIC VITAMIN PO) Take 1 Tab by mouth 2 Times a Day.     • Linagliptin-Metformin HCl ER 2.5-1000 MG TABLET SR 24 HR Take 1 Tab by mouth every day. 30 Tab 5   • hydrocodone-acetaminophen (NORCO) 5-325 MG Tab per tablet Take 1-2 Tabs by mouth every four hours as needed.     • docusate sodium (COLACE)  "100 MG Cap Take 100 mg by mouth as needed for Constipation.       No current facility-administered medications for this visit.          Past medical, surgical, family, and social history are reviewed in Epic chart by me today.   Medications and allergies reviewed in Epic chart by me today.         ROS   No chest pain, no shortness of breath, no abdominal pain  As documented in history of present illness above     Objective:     Blood pressure 128/78, pulse 80, temperature 36.8 °C (98.3 °F), height 1.753 m (5' 9\"), weight 106.6 kg (235 lb 0.2 oz), SpO2 96 %, not currently breastfeeding. Body mass index is 34.71 kg/m². Visit some  Physical Exam:  Constitutional: Alert, oriented in no acute distress.  Psych: Eye contact is good, speech goal directed, affect calm  Lungs: Unlabored respiratory effort, clear to auscultation bilaterally with good excursion, no wheez or rhonci  CV: regular rate and rhythm. No lower extremity edema  Ext: no edema, color normal, vascularity normal, temperature normal        Assessment and Plan:   The following treatment plan was discussed    1. Diabetes mellitus type 2 in obese (HCC)  Had a long discussion with patient to explain our her medications. Advised patient this point she could cut back on metformin from 1000 twice a day to 500 twice a day to decrease diarrhea. This will probably worsen her blood sugar however it's only going to be short-term until we get her started on the toes. Once we start patient on Victoza we can stop Tradjenta as well.  I don't have her last blood work to check her GFR. Advised patient to get blood work done    - POCT Hemoglobin A1C  --> 7.9  - COMP METABOLIC PANEL; Future  - CBC WITH DIFFERENTIAL; Future  - TSH WITH REFLEX TO FT4; Future  - VITAMIN D,25 HYDROXY; Future  - REFERRAL TO ENDOCRINOLOGY  - MICROALBUMIN CREAT RATIO URINE; Future    2. Dyslipidemia    - LIPID PROFILE; Future  - atorvastatin (LIPITOR) 20 MG Tab; Take 1 Tab by mouth every day.  " Dispense: 90 Tab; Refill: 3    3. Osteopathy    - DS-BONE DENSITY STUDY (DEXA); Future    4. Encounter for colorectal cancer screening    - OCCULT BLOOD FECES IMMUNOASSAY; Future      Pt asked permission to record me because she gets confused and wants to show the video w instructions to her daughter.       Followup: Return in about 3 months (around 8/16/2018).  DM          Please note that this dictation was created using voice recognition software. I have made every reasonable attempt to correct obvious errors, but I expect that there are errors of grammar and possibly content that I did not discover before finalizing the note.

## 2018-05-16 NOTE — LETTER
[facility]  Lucero Moyer M.D.  [address]  [facility] [address]  Fax: [phone]   Authorization for Release/Disclosure of   Protected Health Information   Name: LUCERO PALM : 1945 SSN: [id number]   Address: [address] [place] [address] Phone:    [phone] (home)    I authorize the entity listed below to release/disclose the PHI below to:   [facility]/Lucero Moyer M.D. and Jeannie Pantoja P.A.-C.   Provider or Entity Name: Catalina Medeiros     Address   City, [facility], [facility]   Phone:      Fax:  [phone]   Reason for request: continuity of care   Information to be released:    [  ] LAST COLONOSCOPY,  including any PATH REPORT and follow-up  [  ] LAST FIT/COLOGUARD RESULT [  ] LAST DEXA  [xxx  ] LAST MAMMOGRAM  [  ] LAST PAP  [  ] LAST LABS [  ] RETINA EXAM REPORT  [  ] IMMUNIZATION RECORDS  [  ] Release all info      [  ] Check here and initial the line next to each item to release ALL health information INCLUDING  _____ Care and treatment for drug and / or alcohol abuse  _____ HIV testing, infection status, or AIDS  _____ Genetic Testing    DATES OF SERVICE OR TIME PERIOD TO BE DISCLOSED: _____________  I understand and acknowledge that:  * This Authorization may be revoked at any time by you in writing, except if your health information has already been used or disclosed.  * Your health information that will be used or disclosed as a result of you signing this authorization could be re-disclosed by the recipient. If this occurs, your re-disclosed health information may no longer be protected by State or Federal laws.  * You may refuse to sign this Authorization. Your refusal will not affect your ability to obtain treatment.  * This Authorization becomes effective upon signing and will  on (date) __________.      If no date is indicated, this Authorization will  one (1) year from the signature date.    Name: Lucero Palm    Signature:   Date:     5/16/2018       PLEASE FAX REQUESTED RECORDS BACK TO: [phone]

## 2018-05-16 NOTE — ASSESSMENT & PLAN NOTE
Patient is here to follow up with diabetes. Currently she takes metformin extended release 1000 twice daily, Jardiance 25 MG daily, and trajenta 5 MG daily. States metformin causes abdominal discomfort and diarrhea on a daily basis. This is very uncomfortable and inconvenient. Patient has been taking her BG once daily , sometimes fasting sometimes before lunch sometimes before dinner. Numbers averaging from 170- 190.  Describes her diet as healthy, she awaits sugary treats and carbohydrate such as breads. Today in office her A1c is 7.9.   She gets retinal exam at the [facility]. Last eye exam within the last year.

## 2018-05-29 ENCOUNTER — HOSPITAL ENCOUNTER (OUTPATIENT)
Dept: RADIOLOGY | Facility: MEDICAL CENTER | Age: 73
End: 2018-05-29
Attending: PHYSICIAN ASSISTANT
Payer: MEDICARE

## 2018-05-29 DIAGNOSIS — M89.9 OSTEOPATHY: ICD-10-CM

## 2018-05-29 PROCEDURE — 77080 DXA BONE DENSITY AXIAL: CPT

## 2018-05-31 ENCOUNTER — OFFICE VISIT (OUTPATIENT)
Dept: MEDICAL GROUP | Facility: MEDICAL CENTER | Age: 73
End: 2018-05-31
Payer: MEDICARE

## 2018-05-31 ENCOUNTER — HOSPITAL ENCOUNTER (OUTPATIENT)
Dept: LAB | Facility: MEDICAL CENTER | Age: 73
End: 2018-05-31
Attending: PHYSICIAN ASSISTANT
Payer: MEDICARE

## 2018-05-31 VITALS
WEIGHT: 233.69 LBS | RESPIRATION RATE: 20 BRPM | OXYGEN SATURATION: 91 % | BODY MASS INDEX: 34.61 KG/M2 | SYSTOLIC BLOOD PRESSURE: 122 MMHG | HEART RATE: 88 BPM | HEIGHT: 69 IN | TEMPERATURE: 97.4 F | DIASTOLIC BLOOD PRESSURE: 74 MMHG

## 2018-05-31 DIAGNOSIS — M54.31 SCIATICA OF RIGHT SIDE WITHOUT BACK PAIN: ICD-10-CM

## 2018-05-31 DIAGNOSIS — E11.69 DIABETES MELLITUS TYPE 2 IN OBESE (HCC): ICD-10-CM

## 2018-05-31 DIAGNOSIS — E66.9 DIABETES MELLITUS TYPE 2 IN OBESE (HCC): ICD-10-CM

## 2018-05-31 DIAGNOSIS — E78.5 DYSLIPIDEMIA: ICD-10-CM

## 2018-05-31 DIAGNOSIS — E66.9 OBESITY (BMI 30-39.9): ICD-10-CM

## 2018-05-31 PROBLEM — S92.354A CLOSED NONDISPLACED FRACTURE OF FIFTH RIGHT METATARSAL BONE: Status: RESOLVED | Noted: 2017-10-24 | Resolved: 2018-05-31

## 2018-05-31 PROBLEM — R94.31 ABNORMAL EKG: Status: RESOLVED | Noted: 2017-11-07 | Resolved: 2018-05-31

## 2018-05-31 LAB
25(OH)D3 SERPL-MCNC: 21 NG/ML (ref 30–100)
ALBUMIN SERPL BCP-MCNC: 4 G/DL (ref 3.2–4.9)
ALBUMIN/GLOB SERPL: 1.3 G/DL
ALP SERPL-CCNC: 52 U/L (ref 30–99)
ALT SERPL-CCNC: 28 U/L (ref 2–50)
ANION GAP SERPL CALC-SCNC: 10 MMOL/L (ref 0–11.9)
AST SERPL-CCNC: 20 U/L (ref 12–45)
BASOPHILS # BLD AUTO: 1 % (ref 0–1.8)
BASOPHILS # BLD: 0.08 K/UL (ref 0–0.12)
BILIRUB SERPL-MCNC: 0.6 MG/DL (ref 0.1–1.5)
BUN SERPL-MCNC: 23 MG/DL (ref 8–22)
CALCIUM SERPL-MCNC: 9.3 MG/DL (ref 8.5–10.5)
CHLORIDE SERPL-SCNC: 102 MMOL/L (ref 96–112)
CHOLEST SERPL-MCNC: 156 MG/DL (ref 100–199)
CO2 SERPL-SCNC: 26 MMOL/L (ref 20–33)
CREAT SERPL-MCNC: 0.65 MG/DL (ref 0.5–1.4)
CREAT UR-MCNC: 71.9 MG/DL
EOSINOPHIL # BLD AUTO: 0.23 K/UL (ref 0–0.51)
EOSINOPHIL NFR BLD: 2.7 % (ref 0–6.9)
ERYTHROCYTE [DISTWIDTH] IN BLOOD BY AUTOMATED COUNT: 45.8 FL (ref 35.9–50)
GLOBULIN SER CALC-MCNC: 3.2 G/DL (ref 1.9–3.5)
GLUCOSE SERPL-MCNC: 186 MG/DL (ref 65–99)
HCT VFR BLD AUTO: 48.5 % (ref 37–47)
HDLC SERPL-MCNC: 47 MG/DL
HGB BLD-MCNC: 15 G/DL (ref 12–16)
IMM GRANULOCYTES # BLD AUTO: 0.02 K/UL (ref 0–0.11)
IMM GRANULOCYTES NFR BLD AUTO: 0.2 % (ref 0–0.9)
LDLC SERPL CALC-MCNC: 72 MG/DL
LYMPHOCYTES # BLD AUTO: 2.78 K/UL (ref 1–4.8)
LYMPHOCYTES NFR BLD: 33 % (ref 22–41)
MCH RBC QN AUTO: 28.3 PG (ref 27–33)
MCHC RBC AUTO-ENTMCNC: 30.9 G/DL (ref 33.6–35)
MCV RBC AUTO: 91.5 FL (ref 81.4–97.8)
MICROALBUMIN UR-MCNC: <0.7 MG/DL
MICROALBUMIN/CREAT UR: NORMAL MG/G (ref 0–30)
MONOCYTES # BLD AUTO: 0.85 K/UL (ref 0–0.85)
MONOCYTES NFR BLD AUTO: 10.1 % (ref 0–13.4)
NEUTROPHILS # BLD AUTO: 4.46 K/UL (ref 2–7.15)
NEUTROPHILS NFR BLD: 53 % (ref 44–72)
NRBC # BLD AUTO: 0 K/UL
NRBC BLD-RTO: 0 /100 WBC
PLATELET # BLD AUTO: 219 K/UL (ref 164–446)
PMV BLD AUTO: 10.1 FL (ref 9–12.9)
POTASSIUM SERPL-SCNC: 3.7 MMOL/L (ref 3.6–5.5)
PROT SERPL-MCNC: 7.2 G/DL (ref 6–8.2)
RBC # BLD AUTO: 5.3 M/UL (ref 4.2–5.4)
SODIUM SERPL-SCNC: 138 MMOL/L (ref 135–145)
TRIGL SERPL-MCNC: 183 MG/DL (ref 0–149)
TSH SERPL DL<=0.005 MIU/L-ACNC: 1.37 UIU/ML (ref 0.38–5.33)
WBC # BLD AUTO: 8.4 K/UL (ref 4.8–10.8)

## 2018-05-31 PROCEDURE — 80061 LIPID PANEL: CPT

## 2018-05-31 PROCEDURE — 80053 COMPREHEN METABOLIC PANEL: CPT

## 2018-05-31 PROCEDURE — 36415 COLL VENOUS BLD VENIPUNCTURE: CPT

## 2018-05-31 PROCEDURE — 84443 ASSAY THYROID STIM HORMONE: CPT

## 2018-05-31 PROCEDURE — 82306 VITAMIN D 25 HYDROXY: CPT

## 2018-05-31 PROCEDURE — 82570 ASSAY OF URINE CREATININE: CPT

## 2018-05-31 PROCEDURE — 85025 COMPLETE CBC W/AUTO DIFF WBC: CPT

## 2018-05-31 PROCEDURE — 99214 OFFICE O/P EST MOD 30 MIN: CPT | Performed by: INTERNAL MEDICINE

## 2018-05-31 PROCEDURE — 82043 UR ALBUMIN QUANTITATIVE: CPT

## 2018-05-31 RX ORDER — MELOXICAM 7.5 MG/1
7.5 TABLET ORAL DAILY
Qty: 30 TAB | Refills: 1 | Status: SHIPPED | OUTPATIENT
Start: 2018-05-31 | End: 2018-09-11

## 2018-06-01 NOTE — PROGRESS NOTES
Chief Complaint   Patient presents with   • Diabetes   • Medication Management     discuss jardiance,rosuvastatin       HISTORY OF PRESENT ILLNESS: Patient is a 72 y.o. female patient who presents today to discuss the evaluation and management of:      I last saw the patient in November 2017, she was seen by the physician's assistant several weeks ago.    1. Diabetes mellitus type 2 in obese (HCC)    Patient is currently taking metformin 1000 mg every morning, Jardiance 25 mg, and Tradjenta 5 mg. her last hemoglobin A1c was 7.9 earlier this month. She was seen by endocrinologist in [place]. She had fasting blood work done today, we do not have the results yet.    2. Dyslipidemia    Patient was started on atorvastatin 20 mg in November by Dr. Ken from cardiology. She had a moderately elevated coronary calcium score at that time as well as a normal echocardiogram.  total cholesterol was 233 with . Results from today's lipid profile are pending    3. Sciatica of right side without back pain    Patient is complaining of sciatica on the right side for several months. She takes over-the-counter ibuprofen 400 mg in the morning and in the evenings. She was referred for physical therapy by her gynecologist, she had her first visit yesterday. She is interested in trying a prescription anti-inflammatory.    4. Obesity (BMI 30-39.9)    Unfortunately, the patient's weight has increased from 220 to 233 in the last 6 months. She is not doing any regular exercise, and denies dietary indiscretion.        Patient Active Problem List    Diagnosis Date Noted   • Sciatica of right side without back pain 05/31/2018   • Dyslipidemia 11/07/2017   • Diabetes mellitus type 2 in obese (HCC) 11/07/2017   • Gastroesophageal reflux disease without esophagitis 11/07/2017   • Obesity (BMI 30-39.9) 11/07/2017        Allergies:Patient has no known allergies.    Current meds including changes today  Current Outpatient Prescriptions  "  Medication Sig Dispense Refill   • Empagliflozin (JARDIANCE) 25 MG Tab Take 25 mg by mouth every day. 30 Tab 1   • meloxicam (MOBIC) 7.5 MG Tab Take 1 Tab by mouth every day. 30 Tab 1   • metFORMIN (GLUCOPHAGE) 500 MG Tab Take 1,000 mg by mouth 2 times a day, with meals.     • linagliptin (TRADJENTA) 5 MG Tab tablet Take 5 mg by mouth every day.     • aspirin (ASA) 81 MG Chew Tab chewable tablet Take 81 mg by mouth every day.     • atorvastatin (LIPITOR) 20 MG Tab Take 1 Tab by mouth every day. 90 Tab 3   • docusate sodium (COLACE) 100 MG Cap Take 100 mg by mouth as needed for Constipation.     • Specialty Vitamins Products (YUMIKO-RX DIABETIC VITAMIN PO) Take 1 Tab by mouth 2 Times a Day.       No current facility-administered medications for this visit.      Social History   Substance Use Topics   • Smoking status: Never Smoker   • Smokeless tobacco: Never Used   • Alcohol use 0.6 oz/week     1 Glasses of wine per week      Comment: 2-3 per month     Social History     Social History Narrative   • No narrative on file       Family History   Problem Relation Age of Onset   • Breast Cancer Mother 42   • Breast Cancer Sister    • Heart Disease Neg Hx        Review of Systems:  No chest pain, No shortness of breath, No dyspnea on exertion  Gastrointestinal ROS: No abdominal pain, No nausea, vomiting, diarrhea, or constipation        Exam:      Blood pressure 122/74, pulse 88, temperature 36.3 °C (97.4 °F), resp. rate 20, height 1.753 m (5' 9\"), weight 106 kg (233 lb 11 oz), SpO2 91 %, not currently breastfeeding.  General:  Overweight female in NAD affect and mood within normal limits  Head is grossly normal.  Neck: Supple without adenopathy  Pulmonary: Clear to ausculation.  Normal effort. No rales, rhonchi, or wheezing.  Cardiovascular: Regular rate and rhythm without murmur.   Extremities: no clubbing, cyanosis. 1+ pitting pedal and pretibial edema on left   Neuro: moves all extremities symmetrically    Please note " that this dictation was created using voice recognition software. I have made every reasonable attempt to correct obvious errors, but I expect that there are errors of grammar and possibly content that I did not discover before finalizing the note.    Assessment/Plan:  1. Diabetes mellitus type 2 in obese (HCC)    Recommended increasing metformin to 1000 mg twice a day, continue Jardiance and Tradjenta. Also has an endocrinology follow-up  - Empagliflozin (JARDIANCE) 25 MG Tab; Take 25 mg by mouth every day.  Dispense: 30 Tab; Refill: 1    2. Dyslipidemia    On atorvastatin 20 mg daily for several months, repeat lipid panel pending    3. Sciatica of right side without back pain    -Continue physical therapy, trial of anti-inflammatory  - meloxicam (MOBIC) 7.5 MG Tab; Take 1 Tab by mouth every day.  Dispense: 30 Tab; Refill: 1    4. Obesity (BMI 30-39.9)    Discussed importance of regular aerobic exercise and cutting back on the carbohydrates and portion size in her diet.    Followup: Return in about 6 months (around 11/30/2018).

## 2018-06-05 ENCOUNTER — TELEPHONE (OUTPATIENT)
Dept: MEDICAL GROUP | Facility: MEDICAL CENTER | Age: 73
End: 2018-06-05

## 2018-06-05 DIAGNOSIS — B37.31 CANDIDA VAGINITIS: ICD-10-CM

## 2018-06-05 RX ORDER — FLUCONAZOLE 150 MG/1
150 TABLET ORAL DAILY
Qty: 1 TAB | Refills: 0 | Status: SHIPPED | OUTPATIENT
Start: 2018-06-05 | End: 2018-09-11

## 2018-06-05 NOTE — TELEPHONE ENCOUNTER
1. Caller Name: Lucero                                        Call Back Number: [phone] (home)       Patient approves a detailed voicemail message: yes    Patient called stating the jardiance has caused a yeast infection.  She would like a prescription sent to [facility] for this.  Discussed with PCP and she will send rx.

## 2018-06-12 ENCOUNTER — TELEPHONE (OUTPATIENT)
Dept: MEDICAL GROUP | Facility: MEDICAL CENTER | Age: 73
End: 2018-06-12

## 2018-06-12 NOTE — TELEPHONE ENCOUNTER
DOCUMENTATION OF PAR STATUS:    1. Name of Medication & Dose: Jardiance 25 mg     2. Name of Prescription Coverage Company & phone #: [employer]    3. Date Prior Auth Submitted: 6/12/18    4. What information was given to obtain insurance decision? Patient info    5. Prior Auth Status? Pending    6. Patient Notified: yes

## 2018-08-09 ENCOUNTER — OFFICE VISIT (OUTPATIENT)
Dept: ENDOCRINOLOGY | Facility: MEDICAL CENTER | Age: 73
End: 2018-08-09
Payer: MEDICARE

## 2018-08-09 VITALS
WEIGHT: 233 LBS | SYSTOLIC BLOOD PRESSURE: 132 MMHG | DIASTOLIC BLOOD PRESSURE: 74 MMHG | BODY MASS INDEX: 34.51 KG/M2 | HEIGHT: 69 IN | OXYGEN SATURATION: 98 % | HEART RATE: 86 BPM

## 2018-08-09 DIAGNOSIS — E11.69 DIABETES MELLITUS TYPE 2 IN OBESE (HCC): ICD-10-CM

## 2018-08-09 DIAGNOSIS — E66.9 DIABETES MELLITUS TYPE 2 IN OBESE (HCC): ICD-10-CM

## 2018-08-09 LAB
HBA1C MFR BLD: 8 % (ref ?–5.8)
INT CON NEG: NORMAL
INT CON POS: NORMAL

## 2018-08-09 PROCEDURE — 99204 OFFICE O/P NEW MOD 45 MIN: CPT | Performed by: PHYSICIAN ASSISTANT

## 2018-08-09 PROCEDURE — 83036 HEMOGLOBIN GLYCOSYLATED A1C: CPT | Performed by: PHYSICIAN ASSISTANT

## 2018-08-09 NOTE — PATIENT INSTRUCTIONS
Stop:  Tradjenta (Stop)    START:  1.  Trrlicity 0.75 (once a week on Thursday) Then on 8/23/18 INCREASE to 1.5  2.  Stay with Metformin    Blood glucose log: Check BG in the morning when wake up, before lunch or dinner and before bed.  So three times a day.  Always bring BG diary to the next office visit.

## 2018-08-09 NOTE — PROGRESS NOTES
New Patient Consult Note  Referred by: Lucero Moyer M.D.    Reason for consult: Diabetes Management Type 2    HPI:  Lucero Palm is a 72 y.o. old patient who is seeing us today for diabetes care.  This is a pleasant patient with diabetes and I appreciate the opportunity to participate in the care of this patient.  This is a new patient with me today.    Labs of 5/31/18  GFR>60, HDL 47, LDL 72, HbA1c is 7.9    BG Diary:8/9/2018  In the AM: did not bring    Has been Diabetic since  Has a Glucagon pen at home: no    1. Diabetes mellitus type 2 in obese (HCC)  This is a new patient with me on 8/9/18  She is on:  1.  Metformin  2. Tradjenta (not taking)  3. Jardiance (not taking)    Stop:  Tradjenta (Stop)    START:  1.  Trrlicity 0.75 (once a week on Thursday) Then on 8/23/18 INCREASE to 1.5  2.  Stay with Metformin      ROS:   Constitutional: No change in weight , No fatigue, No night sweats.  HEENT: No Headache.  Eyes:  No blurred vision, No visual changes.  Cardiac: No chest pain, No palpitations.  Resp: No shortness of breath, No cough,   Gastro: No nausea or vomiting, No diarrhea.  Neuro: Denies numbness or tinging in bilateral feet or hands, and no loss of sensation.  Endo: No heat or cold intolerance.  : No polyuria, No polydipsia, No chronic UTI's.  Lower extremities: No lower leg edema bilateral.  All other systems were reviewed and were negative.    Past Medical History:  Patient Active Problem List    Diagnosis Date Noted   • Sciatica of right side without back pain 05/31/2018   • Dyslipidemia 11/07/2017   • Diabetes mellitus type 2 in obese (HCC) 11/07/2017   • Gastroesophageal reflux disease without esophagitis 11/07/2017   • Obesity (BMI 30-39.9) 11/07/2017       Past Surgical History:  Past Surgical History:   Procedure Laterality Date   • LITHOTRIPSY  2015   • OTHER  2011    D&C   • INGUINAL HERNIA REPAIR Left 2009   • OTHER  1972    back surgery       Allergies:  Patient has no known  "allergies.    Social History:  Social History     Social History   • Marital status:      Spouse name: N/A   • Number of children: N/A   • Years of education: N/A     Occupational History   • Not on file.     Social History Main Topics   • Smoking status: Never Smoker   • Smokeless tobacco: Never Used   • Alcohol use 0.6 oz/week     1 Glasses of wine per week      Comment: 2-3 per month   • Drug use: No   • Sexual activity: No     Other Topics Concern   • Not on file     Social History Narrative   • No narrative on file       Family History:  Family History   Problem Relation Age of Onset   • Breast Cancer Mother 42   • Breast Cancer Sister    • Heart Disease Neg Hx        Medications:    Current Outpatient Prescriptions:   •  Dulaglutide (TRULICITY) 1.5 MG/0.5ML Solution Pen-injector, Inject 0.5 mL as instructed every 7 days., Disp: 4 PEN, Rfl: 6  •  fluconazole (DIFLUCAN) 150 MG tablet, Take 1 Tab by mouth every day., Disp: 1 Tab, Rfl: 0  •  meloxicam (MOBIC) 7.5 MG Tab, Take 1 Tab by mouth every day., Disp: 30 Tab, Rfl: 1  •  metFORMIN (GLUCOPHAGE) 500 MG Tab, Take 1,000 mg by mouth 2 times a day, with meals., Disp: , Rfl:   •  aspirin (ASA) 81 MG Chew Tab chewable tablet, Take 81 mg by mouth every day., Disp: , Rfl:   •  atorvastatin (LIPITOR) 20 MG Tab, Take 1 Tab by mouth every day., Disp: 90 Tab, Rfl: 3  •  docusate sodium (COLACE) 100 MG Cap, Take 100 mg by mouth as needed for Constipation., Disp: , Rfl:   •  Specialty Vitamins Products (YUMIKO-RX DIABETIC VITAMIN PO), Take 1 Tab by mouth 2 Times a Day., Disp: , Rfl:       Physical Examination:   Vital signs: /74   Pulse 86   Ht 1.753 m (5' 9.02\")   Wt 105.7 kg (233 lb)   SpO2 98%   BMI 34.39 kg/m²   General: No distress, cooperative, well dressed and well nourished.   Eyes: No scleral icterus or discharge, No hyposphagma  ENMT: Normal on external inspection of nose, lips, No nasal drainage   Neck: No abnormal masses on inspection  Resp: " Normal effort, Bilateral clear to auscultation, No wheezing, No rales  CVS: Regular rate and rhythm, S1 S2 normal, No murmur. No gallop  Extremities: No edema bilateral extremities  Neuro: Alert and oriented  Skin: No rash, No Ulcers  Psych: Normal mood and affect  Foot exam: normal sensation to monofilament testing, normal pulses, no ulcers.  Normal Vibration quantitative sensation test.    Assessment and Plan:    1. Diabetes mellitus type 2 in obese (HCC)  Stop:  Tradjenta (Stop)    START:  1.  Trrlicity 0.75 (once a week on Thursday) Then on 8/23/18 INCREASE to 1.5  2.  Stay with Metformin      Return in about 1 month (around 9/9/2018).    Blood glucose log: Check BG in the morning when wake up, before lunch or dinner and before bed.  So three times a day.  Always bring BG diary to the next office visit.     This patient during there office visit was started on new medication .  Side effects of new medications were discussed with the patient today in the office. The patient was supplied paperwork on this new medication.    Thank you kindly for allowing me to participate in the diabetes care plan for this patient.    Hiro Sanchez PA-C, BC-ADM  Board Certified - Advanced Diabetes Management  08/09/18    CC:   Lucero Moyer M.D.

## 2018-09-11 ENCOUNTER — OFFICE VISIT (OUTPATIENT)
Dept: ENDOCRINOLOGY | Facility: MEDICAL CENTER | Age: 73
End: 2018-09-11
Payer: MEDICARE

## 2018-09-11 VITALS
HEIGHT: 69 IN | DIASTOLIC BLOOD PRESSURE: 74 MMHG | WEIGHT: 231.4 LBS | HEART RATE: 90 BPM | SYSTOLIC BLOOD PRESSURE: 134 MMHG | BODY MASS INDEX: 34.27 KG/M2 | OXYGEN SATURATION: 92 %

## 2018-09-11 DIAGNOSIS — E66.9 DIABETES MELLITUS TYPE 2 IN OBESE (HCC): ICD-10-CM

## 2018-09-11 DIAGNOSIS — E11.69 DIABETES MELLITUS TYPE 2 IN OBESE (HCC): ICD-10-CM

## 2018-09-11 PROCEDURE — 99214 OFFICE O/P EST MOD 30 MIN: CPT | Performed by: PHYSICIAN ASSISTANT

## 2018-09-11 RX ORDER — CELECOXIB 100 MG/1
CAPSULE ORAL
COMMUNITY
End: 2018-09-11

## 2018-09-11 RX ORDER — DAPAGLIFLOZIN AND METFORMIN HYDROCHLORIDE 10; 1000 MG/1; MG/1
1 TABLET, FILM COATED, EXTENDED RELEASE ORAL EVERY MORNING
Qty: 30 TAB | Refills: 11 | Status: SHIPPED | OUTPATIENT
Start: 2018-09-11 | End: 2018-12-18 | Stop reason: SDUPTHER

## 2018-09-11 NOTE — PATIENT INSTRUCTIONS
START:  1.  Trrlicity 1.5 (once a week on Thursday)   2.  Stay with Metformin (Stop)  3.  START Xigduo 10/1000 one in the Am

## 2018-09-11 NOTE — PROGRESS NOTES
Return to office Patient Consult Note  Referred by: Lucero Moyer M.D.    Reason for consult: Diabetes Management Type 2    HPI:  Lucero Palm is a 73 y.o. old patient who is seeing us today for diabetes care.  This is a pleasant patient with diabetes and I appreciate the opportunity to participate in the care of this patient.    Labs of 8/9/18 HbA1c is 8.0  Labs of 5/31/18  GFR>60, HDL 47, LDL 72, HbA1c is 7.9    BG Diary:9/11/2018  In the AM:      1. Diabetes mellitus type 2 in obese (HCC)    This is a new patient with me on 8/9/18  She is on:  1.  Metformin  2. Tradjenta (not taking)  3. Jardiance (not taking)     Stop:  Tradjenta (Stop)     START:  1.  Trulicity 1.5 (once a week on Thursday)   2.  Stay with Metformin      ROS:   Constitutional: No night sweats.  Eyes:  No visual changes.  Cardiac: No chest pain, No palpitations or racing heart rate.  Resp: No shortness of breath, No cough,   Gi: No Diarrhea    All other systems were reviewed and were/are negative.  The ROS was revised/revisited during this office visit from the patients first office visit with me on 8/9/18 Please review the full ROS during the first office visit.    Past Medical History:  Patient Active Problem List    Diagnosis Date Noted   • Sciatica of right side without back pain 05/31/2018   • Dyslipidemia 11/07/2017   • Diabetes mellitus type 2 in obese ([facility]) 11/07/2017   • Gastroesophageal reflux disease without esophagitis 11/07/2017   • Obesity (BMI 30-39.9) 11/07/2017       Past Surgical History:  Past Surgical History:   Procedure Laterality Date   • LITHOTRIPSY  2015   • OTHER  2011    D&C   • INGUINAL HERNIA REPAIR Left 2009   • OTHER  1972    back surgery       Allergies:  Patient has no known allergies.    Social History:  Social History     Social History   • Marital status:      Spouse name: N/A   • Number of children: N/A   • Years of education: N/A     Occupational History   • Not on file.     Social History  "Main Topics   • Smoking status: Never Smoker   • Smokeless tobacco: Never Used   • Alcohol use 0.6 oz/week     1 Glasses of wine per week      Comment: 2-3 per month   • Drug use: No   • Sexual activity: No     Other Topics Concern   • Not on file     Social History Narrative   • No narrative on file       Family History:  Family History   Problem Relation Age of Onset   • Breast Cancer Mother 42   • Breast Cancer Sister    • Heart Disease Neg Hx        Medications:    Current Outpatient Prescriptions:   •  IBUPROFEN-FAMOTIDINE PO, Take  by mouth., Disp: , Rfl:   •  Multiple Vitamins-Minerals (MULTIVITAMIN ADULT PO), Take  by mouth., Disp: , Rfl:   •  Dulaglutide (TRULICITY) 1.5 MG/0.5ML Solution Pen-injector, Inject 0.5 mL as instructed every 7 days., Disp: 4 PEN, Rfl: 6  •  Dapagliflozin-Metformin HCl ER (XIGDUO XR)  MG TABLET SR 24 HR, Take 1 Tab by mouth every morning., Disp: 30 Tab, Rfl: 11  •  atorvastatin (LIPITOR) 20 MG Tab, Take 1 Tab by mouth every day., Disp: 90 Tab, Rfl: 3        Physical Examination:   Vital signs: /74   Pulse 90   Ht 1.753 m (5' 9.02\")   Wt 105 kg (231 lb 6.4 oz)   SpO2 92%   BMI 34.16 kg/m²   General: No distress, cooperative, well dressed and well nourished.   Eyes: No scleral icterus or discharge, No hyposphagma  ENMT: Normal on external inspection of nose, lips, No nasal drainage   Neck: No abnormal masses on inspection  Resp: Normal effort, Bilateral clear to auscultation, No wheezing, No rales  CVS: Regular rate and rhythm, S1 S2 normal, No murmur. No gallop  Extremities: No edema bilateral extremities  Neuro: Alert and oriented  Skin: No rash, No Ulcers  Psych: Normal mood and affect      Assessment and Plan:    1. Diabetes mellitus type 2 in obese (HCC)    START:  1.  Trrlicity 1.5 (once a week on Thursday)   2.  Stay with Metformin (Stop)  3.  START Xigduo 10/1000 one in the Am    Return in about 1 month (around 10/11/2018).    Blood glucose log: Check BG in " the morning when wake up, before lunch or dinner and before bed.  So three times a day.  Always bring BG diary to the next office visit.     Thank you kindly for allowing me to participate in the diabetes care plan for this patient.    Hiro Sanchez PA-C, BC-[facility]  Board Certified - Advanced Diabetes Management  09/11/18    CC:   Lucero Moyer M.D.

## 2018-09-20 ENCOUNTER — TELEPHONE (OUTPATIENT)
Dept: ENDOCRINOLOGY | Facility: MEDICAL CENTER | Age: 73
End: 2018-09-20

## 2018-09-20 NOTE — TELEPHONE ENCOUNTER
Phone Number Called: [phone] (home)       Message: patient is getting yeast infections from higher blood sugar numbers. Told to seek care for yeast infection with primary car or urgent care. Scheduled for Tuesday to address high bgl.    Left Message for patient to call back: no

## 2018-09-20 NOTE — TELEPHONE ENCOUNTER
Pt called stating that she is getting a yeast infection, and has stopped taking the Xigduo. She also stated that her number haven been high as well. She wanted to know what she should do and if this is normal.

## 2018-09-25 ENCOUNTER — OFFICE VISIT (OUTPATIENT)
Dept: ENDOCRINOLOGY | Facility: MEDICAL CENTER | Age: 73
End: 2018-09-25
Payer: MEDICARE

## 2018-09-25 VITALS
DIASTOLIC BLOOD PRESSURE: 68 MMHG | HEIGHT: 69 IN | OXYGEN SATURATION: 93 % | SYSTOLIC BLOOD PRESSURE: 110 MMHG | BODY MASS INDEX: 34.66 KG/M2 | WEIGHT: 234 LBS | HEART RATE: 120 BPM

## 2018-09-25 DIAGNOSIS — E66.9 DIABETES MELLITUS TYPE 2 IN OBESE (HCC): ICD-10-CM

## 2018-09-25 DIAGNOSIS — E11.69 DIABETES MELLITUS TYPE 2 IN OBESE (HCC): ICD-10-CM

## 2018-09-25 PROCEDURE — 99214 OFFICE O/P EST MOD 30 MIN: CPT | Performed by: PHYSICIAN ASSISTANT

## 2018-09-25 NOTE — PROGRESS NOTES
Return to office Patient Consult Note  Referred by: Lucero Moyer M.D.    Reason for consult: Diabetes Management Type 2    HPI:  Lucero Palm is a 73 y.o. old patient who is seeing us today for diabetes care.  This is a pleasant patient with diabetes and I appreciate the opportunity to participate in the care of this patient.    BG Diary:9/25/2018  In the AM: 123, 144, 131, 144, 123, 135, 140  Before Bed: 193, 169, 144, 5, 51     Labs of 8/9/18 HbA1c is 8.0  Labs of 5/31/18  GFR>60, HDL 47, LDL 72, HbA1c is 7.9     BG Diary:9/11/2018  In the AM:       1. Diabetes mellitus type 2 in obese (HCC)     This is a new patient with me on 8/9/18  She is on:  1.  Metformin  2. Tradjenta (not taking)  3. Jardiance (not taking)     Stop:  Tradjenta (Stop)     START:  1.  Trulicity 1.5 (once a week on Thursday)   2.  Stay with Metformin  3.  START Xigduo 10/1000 one in the Am (started 9/11/18 but stopped)         ROS:   Constitutional: No night sweats.  Eyes:  No visual changes.  Cardiac: No chest pain, No palpitations or racing heart rate.  Resp: No shortness of breath, No cough,   Gi: No Diarrhea    All other systems were reviewed and were/are negative.  The ROS was revised/revisited during this office visit from the patients first office visit with me on 8/9/18 Please review the full ROS during the first office visit.    Past Medical History:  Patient Active Problem List    Diagnosis Date Noted   • Sciatica of right side without back pain 05/31/2018   • Dyslipidemia 11/07/2017   • Diabetes mellitus type 2 in obese (HCC) 11/07/2017   • Gastroesophageal reflux disease without esophagitis 11/07/2017   • Obesity (BMI 30-39.9) 11/07/2017       Past Surgical History:  Past Surgical History:   Procedure Laterality Date   • LITHOTRIPSY  2015   • OTHER  2011    D&C   • INGUINAL HERNIA REPAIR Left 2009   • OTHER  1972    back surgery       Allergies:  Xigduo xr [dapagliflozin-metformin hcl er]    Social History:  Social  "History     Social History   • Marital status:      Spouse name: N/A   • Number of children: N/A   • Years of education: N/A     Occupational History   • Not on file.     Social History Main Topics   • Smoking status: Never Smoker   • Smokeless tobacco: Never Used   • Alcohol use 0.6 oz/week     1 Glasses of wine per week      Comment: 2-3 per month   • Drug use: No   • Sexual activity: No     Other Topics Concern   • Not on file     Social History Narrative   • No narrative on file       Family History:  Family History   Problem Relation Age of Onset   • Breast Cancer Mother 42   • Breast Cancer Sister    • Heart Disease Neg Hx        Medications:    Current Outpatient Prescriptions:   •  glucose blood (ACCU-CHEK SMARTVIEW) strip, 1 Strip by Other route 2 times a day., Disp: 50 Strip, Rfl: 11  •  Lancet Devices Misc, 1 Applicator by Does not apply route 2 times a day, with meals., Disp: 50 Applicator, Rfl: 11  •  IBUPROFEN-FAMOTIDINE PO, Take  by mouth., Disp: , Rfl:   •  Multiple Vitamins-Minerals (MULTIVITAMIN ADULT PO), Take  by mouth., Disp: , Rfl:   •  Dulaglutide (TRULICITY) 1.5 MG/0.5ML Solution Pen-injector, Inject 0.5 mL as instructed every 7 days., Disp: 4 PEN, Rfl: 6  •  Dapagliflozin-Metformin HCl ER (XIGDUO XR)  MG TABLET SR 24 HR, Take 1 Tab by mouth every morning., Disp: 30 Tab, Rfl: 11  •  atorvastatin (LIPITOR) 20 MG Tab, Take 1 Tab by mouth every day., Disp: 90 Tab, Rfl: 3        Physical Examination:   Vital signs: /68 (BP Location: Right arm, Patient Position: Sitting, BP Cuff Size: Large adult)   Pulse (!) 120   Ht 1.753 m (5' 9\")   Wt 106.1 kg (234 lb)   SpO2 93%   BMI 34.56 kg/m²   General: No distress, cooperative, well dressed and well nourished.   Eyes: No scleral icterus or discharge, No hyposphagma  ENMT: Normal on external inspection of nose, lips, No nasal drainage   Neck: No abnormal masses on inspection  Resp: Normal effort, Bilateral clear to auscultation, " No wheezing, No rales  CVS: Regular rate and rhythm, S1 S2 normal, No murmur. No gallop  Extremities: No edema bilateral extremities  Neuro: Alert and oriented  Skin: No rash, No Ulcers  Psych: Normal mood and affect      Assessment and Plan:    1. Diabetes mellitus type 2 in obese (HCC)    START:  1.  Trulicity 1.5 (once a week on Thursday)   2.  Stay with Metformin  3.  START Xigduo 10/1000 one in the Am (started 9/11/18)    Return in about 1 month (around 10/25/2018).    Blood glucose log: Check BG in the morning when wake up, before lunch or dinner and before bed.  So three times a day.  Always bring BG diary to the next office visit.     Thank you kindly for allowing me to participate in the diabetes care plan for this patient.    Hiro Sanchez PA-C, BC-ADM  Board Certified - Advanced Diabetes Management  09/25/18    CC:   Lucero Moyer M.D.

## 2018-10-04 DIAGNOSIS — E11.69 DIABETES MELLITUS TYPE 2 IN OBESE (HCC): ICD-10-CM

## 2018-10-04 DIAGNOSIS — E66.9 DIABETES MELLITUS TYPE 2 IN OBESE (HCC): ICD-10-CM

## 2018-10-16 ENCOUNTER — OFFICE VISIT (OUTPATIENT)
Dept: ENDOCRINOLOGY | Facility: MEDICAL CENTER | Age: 73
End: 2018-10-16
Payer: MEDICARE

## 2018-10-16 VITALS
OXYGEN SATURATION: 95 % | BODY MASS INDEX: 34.16 KG/M2 | HEIGHT: 69 IN | WEIGHT: 230.6 LBS | SYSTOLIC BLOOD PRESSURE: 126 MMHG | DIASTOLIC BLOOD PRESSURE: 68 MMHG | HEART RATE: 96 BPM

## 2018-10-16 DIAGNOSIS — E66.9 DIABETES MELLITUS TYPE 2 IN OBESE (HCC): ICD-10-CM

## 2018-10-16 DIAGNOSIS — E11.69 DIABETES MELLITUS TYPE 2 IN OBESE (HCC): ICD-10-CM

## 2018-10-16 PROCEDURE — 99214 OFFICE O/P EST MOD 30 MIN: CPT | Performed by: PHYSICIAN ASSISTANT

## 2018-10-16 RX ORDER — PIOGLITAZONEHYDROCHLORIDE 30 MG/1
30 TABLET ORAL DAILY
Qty: 30 TAB | Refills: 11 | Status: SHIPPED | OUTPATIENT
Start: 2018-10-16 | End: 2018-10-23 | Stop reason: SDUPTHER

## 2018-10-16 NOTE — PROGRESS NOTES
Return to office Patient Consult Note  Referred by: Lucero Moyer M.D.    Reason for consult: Diabetes Management Type 2    HPI:  Lucero Palm is a 73 y.o. old patient who is seeing us today for diabetes care.  This is a pleasant patient with diabetes and I appreciate the opportunity to participate in the care of this patient.    Labs of 8/9/18 HbA1c is 8.0  Labs of 5/31/18  GFR>60, HDL 47, LDL 72, HbA1c is 7.9    BG Diary:10/16/2018  In the AM: 35, 150, 54, 142, 140, 165, 139, 160, 132  Before Bed: 187, 159, 174, 159, 123, 139, 174    BG Diary:9/25/2018  In the AM: 123, 144, 131, 144, 123, 135, 140  Before Bed: 193, 169, 144, 145, 151       1. Diabetes mellitus type 2 in obese (HCC)    This is a new patient with me on 8/9/18  She is on:  1.  Metformin  2. Tradjenta (not taking)  3. Jardiance (not taking)     Stop:  Tradjenta (Stop)     START:  1.  Trulicity 1.5 (once a week on Thursday)   2.  START Xigduo 10/1000 one in the Am (started 9/11/18)      ROS:   Constitutional: No night sweats.  Eyes:  No visual changes.  Cardiac: No chest pain, No palpitations or racing heart rate.  Resp: No shortness of breath, No cough,   Gi: No Diarrhea    All other systems were reviewed and were/are negative.  The ROS was revised/revisited during this office visit from the patients first office visit with me on 8/9/18 Please review the full ROS during the first office visit.    Past Medical History:  Patient Active Problem List    Diagnosis Date Noted   • Sciatica of right side without back pain 05/31/2018   • Dyslipidemia 11/07/2017   • Diabetes mellitus type 2 in obese (HCC) 11/07/2017   • Gastroesophageal reflux disease without esophagitis 11/07/2017   • Obesity (BMI 30-39.9) 11/07/2017       Past Surgical History:  Past Surgical History:   Procedure Laterality Date   • LITHOTRIPSY  2015   • OTHER  2011    D&C   • INGUINAL HERNIA REPAIR Left 2009   • OTHER  1972    back surgery       Allergies:  Xigduo xr  "[dapagliflozin-metformin hcl er]    Social History:  Social History     Social History   • Marital status:      Spouse name: N/A   • Number of children: N/A   • Years of education: N/A     Occupational History   • Not on file.     Social History Main Topics   • Smoking status: Never Smoker   • Smokeless tobacco: Never Used   • Alcohol use 0.6 oz/week     1 Glasses of wine per week      Comment: 2-3 per month   • Drug use: No   • Sexual activity: No     Other Topics Concern   • Not on file     Social History Narrative   • No narrative on file       Family History:  Family History   Problem Relation Age of Onset   • Breast Cancer Mother 42   • Breast Cancer Sister    • Heart Disease Neg Hx        Medications:    Current Outpatient Prescriptions:   •  glucose blood (ACCU-CHEK SMARTVIEW) strip, 1 Strip by Other route 2 times a day., Disp: 60 Strip, Rfl: 11  •  Lancet Devices Misc, 1 Applicator by Does not apply route 2 times a day, with meals., Disp: 50 Applicator, Rfl: 11  •  Multiple Vitamins-Minerals (MULTIVITAMIN ADULT PO), Take  by mouth., Disp: , Rfl:   •  Dulaglutide (TRULICITY) 1.5 MG/0.5ML Solution Pen-injector, Inject 0.5 mL as instructed every 7 days., Disp: 4 PEN, Rfl: 6  •  Dapagliflozin-Metformin HCl ER (XIGDUO XR)  MG TABLET SR 24 HR, Take 1 Tab by mouth every morning., Disp: 30 Tab, Rfl: 11  •  atorvastatin (LIPITOR) 20 MG Tab, Take 1 Tab by mouth every day., Disp: 90 Tab, Rfl: 3  •  IBUPROFEN-FAMOTIDINE PO, Take  by mouth., Disp: , Rfl:         Physical Examination:   Vital signs: /68 (BP Location: Right arm, Patient Position: Sitting, BP Cuff Size: Large adult)   Pulse 96   Ht 1.753 m (5' 9.02\")   Wt 104.6 kg (230 lb 9.6 oz)   SpO2 95%   BMI 34.04 kg/m²   General: No distress, cooperative, well dressed and well nourished.   Eyes: No scleral icterus or discharge, No hyposphagma  ENMT: Normal on external inspection of nose, lips, No nasal drainage   Neck: No abnormal masses on " inspection  Resp: Normal effort, Bilateral clear to auscultation, No wheezing, No rales  CVS: Regular rate and rhythm, S1 S2 normal, No murmur. No gallop  Extremities: No edema bilateral extremities  Neuro: Alert and oriented  Skin: No rash, No Ulcers  Psych: Normal mood and affect      Assessment and Plan:    1. Diabetes mellitus type 2 in obese (HCC)    START:  1.  Trulicity 1.5 (once a week on Thursday)   2.  START Xigduo 10/1000 one in the Am (started 9/11/18)  3.   Actos 30mg one a day    Return in about 2 months (around 12/16/2018).    Blood glucose log: Check BG in the morning when wake up, before lunch or dinner and before bed.  So three times a day.  Always bring BG diary to the next office visit.     Thank you kindly for allowing me to participate in the diabetes care plan for this patient.    Hiro Sanchez PA-C, BC-ADM  Board Certified - Advanced Diabetes Management  10/16/18    CC:   Lucero Moyer M.D.

## 2018-10-16 NOTE — PATIENT INSTRUCTIONS
START:  1.  Trulicity 1.5 (once a week on Thursday)   2.  START Xigduo 10/1000 one in the Am (started 9/11/18)  3.   Actos 30mg one a day

## 2018-10-23 ENCOUNTER — TELEPHONE (OUTPATIENT)
Dept: ENDOCRINOLOGY | Facility: MEDICAL CENTER | Age: 73
End: 2018-10-23

## 2018-10-23 DIAGNOSIS — E11.69 DIABETES MELLITUS TYPE 2 IN OBESE (HCC): ICD-10-CM

## 2018-10-23 DIAGNOSIS — E66.9 DIABETES MELLITUS TYPE 2 IN OBESE (HCC): ICD-10-CM

## 2018-10-23 RX ORDER — PIOGLITAZONEHYDROCHLORIDE 30 MG/1
30 TABLET ORAL DAILY
Qty: 30 TAB | Refills: 11 | Status: SHIPPED | OUTPATIENT
Start: 2018-10-23 | End: 2018-12-18

## 2018-10-23 NOTE — TELEPHONE ENCOUNTER
Can you please send over prescription with diagnosis code E11.69 for patient to be able to  rx     Actos  accucheck test strips and   lancets

## 2018-10-30 ENCOUNTER — TELEPHONE (OUTPATIENT)
Dept: ENDOCRINOLOGY | Facility: MEDICAL CENTER | Age: 73
End: 2018-10-30

## 2018-10-31 NOTE — TELEPHONE ENCOUNTER
Maybe if we change her testing supplies going to [facility] to a generic E11.9 they will go through Medicare.

## 2018-11-01 DIAGNOSIS — E66.9 DIABETES MELLITUS TYPE 2 IN OBESE (HCC): ICD-10-CM

## 2018-11-01 DIAGNOSIS — E11.69 DIABETES MELLITUS TYPE 2 IN OBESE (HCC): ICD-10-CM

## 2018-11-02 ENCOUNTER — TELEPHONE (OUTPATIENT)
Dept: ENDOCRINOLOGY | Facility: MEDICAL CENTER | Age: 73
End: 2018-11-02

## 2018-11-02 DIAGNOSIS — E66.9 DIABETES MELLITUS TYPE 2 IN OBESE (HCC): ICD-10-CM

## 2018-11-02 DIAGNOSIS — E11.69 DIABETES MELLITUS TYPE 2 IN OBESE (HCC): ICD-10-CM

## 2018-11-02 NOTE — TELEPHONE ENCOUNTER
Pt called and stated that Medicare is still needing a correct ICD 10 code to fill her needles and test strips. Pt asked if our office could call Medicare and give them the code.

## 2018-11-06 DIAGNOSIS — E66.9 DIABETES MELLITUS TYPE 2 IN OBESE (HCC): ICD-10-CM

## 2018-11-06 DIAGNOSIS — E11.69 DIABETES MELLITUS TYPE 2 IN OBESE (HCC): ICD-10-CM

## 2018-11-06 NOTE — TELEPHONE ENCOUNTER
Are we able to send this prescription with all the diagnosis codes in the instruction area to [place] [facility].   After this I dont know what else to do for this patient

## 2018-11-06 NOTE — TELEPHONE ENCOUNTER
Phone Number Called: medicare supplement    Message: Called medicare waited 15 minutes on hold with no answer as to how to get this prescription. Person on the line did not have an answer    Left Message for patient to call back: no

## 2018-11-13 ENCOUNTER — TELEPHONE (OUTPATIENT)
Dept: ENDOCRINOLOGY | Facility: MEDICAL CENTER | Age: 73
End: 2018-11-13

## 2018-11-13 RX ORDER — BLOOD-GLUCOSE METER
1 EACH MISCELLANEOUS 4 TIMES DAILY
Qty: 1 KIT | Refills: 1 | Status: SHIPPED | OUTPATIENT
Start: 2018-11-13 | End: 2018-11-19 | Stop reason: SDUPTHER

## 2018-11-13 RX ORDER — BLOOD-GLUCOSE METER
1 EACH MISCELLANEOUS 4 TIMES DAILY
Qty: 1 KIT | Refills: 1 | Status: SHIPPED | OUTPATIENT
Start: 2018-11-13 | End: 2018-11-13 | Stop reason: SDUPTHER

## 2018-11-13 NOTE — TELEPHONE ENCOUNTER
1. Caller Name: Lucero                                         Call Back Number: [phone] (home)         Patient approves a detailed voicemail message: no    Pt called to let us know she does not have a new meter for the strips her insurance will cover. Please send in rx for Accu-check smart meter with Diagnosis code E11.69 in the instructions.

## 2018-11-16 NOTE — TELEPHONE ENCOUNTER
Can you please send it in to the [place] [facility] instead of [facility]. They wont let me call it in

## 2018-11-19 RX ORDER — BLOOD-GLUCOSE METER
1 EACH MISCELLANEOUS 4 TIMES DAILY
Qty: 1 KIT | Refills: 1 | Status: SHIPPED | OUTPATIENT
Start: 2018-11-19 | End: 2021-03-29

## 2018-11-29 ENCOUNTER — OFFICE VISIT (OUTPATIENT)
Dept: MEDICAL GROUP | Facility: MEDICAL CENTER | Age: 73
End: 2018-11-29
Payer: MEDICARE

## 2018-11-29 VITALS
TEMPERATURE: 98.7 F | OXYGEN SATURATION: 94 % | HEART RATE: 88 BPM | HEIGHT: 69 IN | SYSTOLIC BLOOD PRESSURE: 116 MMHG | WEIGHT: 227.8 LBS | DIASTOLIC BLOOD PRESSURE: 60 MMHG | BODY MASS INDEX: 33.74 KG/M2 | RESPIRATION RATE: 16 BRPM

## 2018-11-29 DIAGNOSIS — E66.9 OBESITY (BMI 30-39.9): ICD-10-CM

## 2018-11-29 DIAGNOSIS — E66.9 DIABETES MELLITUS TYPE 2 IN OBESE (HCC): ICD-10-CM

## 2018-11-29 DIAGNOSIS — E78.5 DYSLIPIDEMIA: ICD-10-CM

## 2018-11-29 DIAGNOSIS — E11.69 DIABETES MELLITUS TYPE 2 IN OBESE (HCC): ICD-10-CM

## 2018-11-29 DIAGNOSIS — M54.31 SCIATICA OF RIGHT SIDE WITHOUT BACK PAIN: ICD-10-CM

## 2018-11-29 PROCEDURE — 99214 OFFICE O/P EST MOD 30 MIN: CPT | Performed by: INTERNAL MEDICINE

## 2018-11-29 ASSESSMENT — PATIENT HEALTH QUESTIONNAIRE - PHQ9: CLINICAL INTERPRETATION OF PHQ2 SCORE: 0

## 2018-11-29 NOTE — PROGRESS NOTES
"Chief Complaint   Patient presents with   • Tired     \"For a while\"    • Diabetic Neuropathy     forms for shoes     Chief complaint: 6-month follow-up of diabetes, has neuropathy and is requesting form for orthotics.    HISTORY OF PRESENT ILLNESS: Patient is a 73 y.o. female patient who presents today to discuss the evaluation and management of:          1. Diabetes mellitus type 2 in obese (HCC)    Patient is followed by PA and endocrinology office.  She recently had her medications changed, she currently is on Trulicity 0.5 mL's weekly, xigduo 10/1000 every morning, and Actos 30 mg daily.  Her last hemoglobin A1c was 8.0, she has an upcoming appointment with him.  Patient saw a podiatrist due to pain in her feet, she was diagnosed with mild neuropathy, she also has a hammertoe it an old fracture on the last which is causing deformity.  She has been prescribed orthotics and special shoes.    2. Dyslipidemia    Results for NANDO PRITCHARD (MRN [id number]) as of 11/29/2018 11:40   Ref. Range 5/31/2018 08:13   Cholesterol,Tot Latest Ref Range: 100 - 199 mg/dL 156   Triglycerides Latest Ref Range: 0 - 149 mg/dL 183 (H)   HDL Latest Ref Range: >=40 mg/dL 47   LDL Latest Ref Range: <100 mg/dL 72     Lipids at goal on atorvastatin 20 mill grams daily.    3. Obesity (BMI 30-39.9)    Patient has lost 7 pounds since I saw her 6 months ago.  She states her appetite is reduced on her new diabetes medications.  She is unable to exercise due to her foot pain.    4. Sciatica of right side without back pain    Patient continues to have intermittent sciatica primarily in her right buttock.  She states she had x-rays done years ago which revealed \"arthritis\".  Her pain is controlled with ibuprofen 800 mg once daily.  Kidney function was normal in May 2018.        Patient Active Problem List    Diagnosis Date Noted   • Sciatica of right side without back pain 05/31/2018   • Dyslipidemia 11/07/2017   • Diabetes mellitus type 2 in " obese (HCC) 11/07/2017   • Gastroesophageal reflux disease without esophagitis 11/07/2017   • Obesity (BMI 30-39.9) 11/07/2017        Allergies:Xigduo xr [dapagliflozin-metformin hcl er]    Current meds including changes today  Current Outpatient Prescriptions   Medication Sig Dispense Refill   • Blood Glucose Monitoring Suppl (ACCU-CHEK JAMIE PLUS) w/Device Kit 1 Kit by Does not apply route 4 times a day. 1 Kit 1   • glucose blood (ACCU-CHEK JAMIE PLUS) strip 1 Strip by Other route 4 times a day. 150 Strip 11   • glucose blood (ACCU-CHEK SMARTVIEW) strip 1 Strip by Other route 2 times a day. 60 Strip 11   • pioglitazone (ACTOS) 30 MG Tab Take 1 Tab by mouth every day. 30 Tab 11   • Lancet Devices Misc 1 Applicator by Does not apply route 2 times a day, with meals. 50 Applicator 11   • IBUPROFEN-FAMOTIDINE PO Take  by mouth.     • Multiple Vitamins-Minerals (MULTIVITAMIN ADULT PO) Take  by mouth.     • Dulaglutide (TRULICITY) 1.5 MG/0.5ML Solution Pen-injector Inject 0.5 mL as instructed every 7 days. 4 PEN 6   • Dapagliflozin-Metformin HCl ER (XIGDUO XR)  MG TABLET SR 24 HR Take 1 Tab by mouth every morning. 30 Tab 11   • atorvastatin (LIPITOR) 20 MG Tab Take 1 Tab by mouth every day. 90 Tab 3     No current facility-administered medications for this visit.      Social History   Substance Use Topics   • Smoking status: Never Smoker   • Smokeless tobacco: Never Used   • Alcohol use 0.6 oz/week     1 Glasses of wine per week      Comment: 2-3 per month     Social History     Social History Narrative   • No narrative on file       Family History   Problem Relation Age of Onset   • Breast Cancer Mother 42   • Breast Cancer Sister    • Heart Disease Neg Hx        Review of Systems:  No chest pain, No shortness of breath, No dyspnea on exertion  Gastrointestinal ROS: No abdominal pain, No nausea, vomiting, diarrhea, or constipation        Exam:      Blood pressure 116/60, pulse 88, temperature 37.1 °C (98.7 °F),  "temperature source Temporal, resp. rate 16, height 1.753 m (5' 9.02\"), weight 103.3 kg (227 lb 12.8 oz), SpO2 94 %, not currently breastfeeding.  General:  Well nourished, well developed female in NAD affect and mood within normal limits  Head is grossly normal.  Neck: Supple without adenopathy  Pulmonary: Clear to ausculation.  Normal effort. No rales, rhonchi, or wheezing.  Cardiovascular: Regular rate and rhythm without murmur.   Extremities: no clubbing, cyanosis, or edema.  Neuro: moves all extremities symmetrically    Please note that this dictation was created using voice recognition software. I have made every reasonable attempt to correct obvious errors, but I expect that there are errors of grammar and possibly content that I did not discover before finalizing the note.    Assessment/Plan:  1. Diabetes mellitus type 2 in obese (HCC)    -Continue follow-up with endocrinology  - COMP METABOLIC PANEL; Future  - HEMOGLOBIN A1C; Future    2. Dyslipidemia    Continue atorvastatin  - Lipid Profile; Future    3. Obesity (BMI 30-39.9)        4. Sciatica of right side without back pain    Patient advised it was safe to continue with her present level of ibuprofen.  If she is taking more than that, would need to follow kidney function every 6 months.    Patient firmly declined pneumonia or influenza vaccines.    Followup: Return in about 6 months (around 5/29/2019).        "

## 2018-12-18 ENCOUNTER — OFFICE VISIT (OUTPATIENT)
Dept: ENDOCRINOLOGY | Facility: MEDICAL CENTER | Age: 73
End: 2018-12-18
Payer: MEDICARE

## 2018-12-18 VITALS
HEIGHT: 69 IN | HEART RATE: 85 BPM | BODY MASS INDEX: 34.54 KG/M2 | WEIGHT: 233.2 LBS | OXYGEN SATURATION: 95 % | DIASTOLIC BLOOD PRESSURE: 72 MMHG | SYSTOLIC BLOOD PRESSURE: 148 MMHG

## 2018-12-18 DIAGNOSIS — E66.9 DIABETES MELLITUS TYPE 2 IN OBESE (HCC): ICD-10-CM

## 2018-12-18 DIAGNOSIS — E11.69 DIABETES MELLITUS TYPE 2 IN OBESE (HCC): ICD-10-CM

## 2018-12-18 PROCEDURE — 83036 HEMOGLOBIN GLYCOSYLATED A1C: CPT | Performed by: PHYSICIAN ASSISTANT

## 2018-12-18 PROCEDURE — 99214 OFFICE O/P EST MOD 30 MIN: CPT | Performed by: PHYSICIAN ASSISTANT

## 2018-12-18 RX ORDER — DAPAGLIFLOZIN AND METFORMIN HYDROCHLORIDE 10; 1000 MG/1; MG/1
1 TABLET, FILM COATED, EXTENDED RELEASE ORAL EVERY MORNING
Qty: 30 TAB | Refills: 11 | Status: SHIPPED | OUTPATIENT
Start: 2018-12-18 | End: 2019-03-26 | Stop reason: SDUPTHER

## 2018-12-18 NOTE — PATIENT INSTRUCTIONS
Now on:  1.   Trulicity 1.5 (once a week on Thursday)   2.   Xigduo 10/1000 one in the Am (started 9/11/18)  3.   Actos 30mg one a day (stop) starts with a P small tablet

## 2018-12-18 NOTE — PROGRESS NOTES
Return to office Patient Consult Note  Referred by: Lucero Moyer M.D.    Reason for consult: Diabetes Management Type 2    HPI:  Lucero Palm is a 73 y.o. old patient who is seeing us today for diabetes care.  This is a pleasant patient with diabetes and I appreciate the opportunity to participate in the care of this patient.    Labs of 12/18/18 HbA1c is 5.9  Labs of 8/9/18 HbA1c is 8.0  Labs of 5/31/18  GFR>60, HDL 47, LDL 72, HbA1c is 7.9    BG Diary:12/18/2018  In the AM:  No log    BG Diary:10/16/2018  In the AM: 35, 150, 54, 142, 140, 165, 139, 160, 132  Before Bed: 187, 159, 174, 159, 123, 139, 174     BG Diary:9/25/2018  In the AM: 123, 144, 131, 144, 123, 135, 140  Before Bed: 193, 169, 144, 145, 151     1. Diabetes mellitus type 2 in obese (HCC)    This is a new patient with me on 8/9/18  She was on:  1.  Metformin  2. Tradjenta (not taking)  3. Jardiance (not taking)     Stop:  Tradjenta (Stop)     Now on:  1.   Trulicity 1.5 (once a week on Thursday)   2.   Xigduo 10/1000 one in the Am (started 9/11/18)  3.   Actos 30mg one a day     ROS:   Constitutional: No night sweats.  Eyes:  No visual changes.  Cardiac: No chest pain, No palpitations or racing heart rate.  Resp: No shortness of breath, No cough,   Gi: No Diarrhea    All other systems were reviewed and were/are negative.  The ROS was revised/revisited during this office visit from the patients first office visit with me on 8/9/18. Please review the full ROS during the first office visit.    Past Medical History:  Patient Active Problem List    Diagnosis Date Noted   • Sciatica of right side without back pain 05/31/2018   • Dyslipidemia 11/07/2017   • Diabetes mellitus type 2 in obese (HCC) 11/07/2017   • Gastroesophageal reflux disease without esophagitis 11/07/2017   • Obesity (BMI 30-39.9) 11/07/2017       Past Surgical History:  Past Surgical History:   Procedure Laterality Date   • LITHOTRIPSY  2015   • OTHER  2011    D&C   •  INGUINAL HERNIA REPAIR Left 2009   • OTHER  1972    back surgery       Allergies:  Xigduo xr [dapagliflozin-metformin hcl er]    Social History:  Social History     Social History   • Marital status:      Spouse name: N/A   • Number of children: N/A   • Years of education: N/A     Occupational History   • Not on file.     Social History Main Topics   • Smoking status: Never Smoker   • Smokeless tobacco: Never Used   • Alcohol use 0.6 oz/week     1 Glasses of wine per week      Comment: 2-3 per month   • Drug use: No   • Sexual activity: No     Other Topics Concern   • Not on file     Social History Narrative   • No narrative on file       Family History:  Family History   Problem Relation Age of Onset   • Breast Cancer Mother 42   • Breast Cancer Sister    • Heart Disease Neg Hx        Medications:    Current Outpatient Prescriptions:   •  IBUPROFEN-FAMOTIDINE PO, Take  by mouth., Disp: , Rfl:   •  pioglitazone (ACTOS) 30 MG Tab, Take 1 Tab by mouth every day., Disp: 30 Tab, Rfl: 11  •  Lancet Devices Misc, 1 Applicator by Does not apply route 2 times a day, with meals., Disp: 50 Applicator, Rfl: 11  •  IBUPROFEN-FAMOTIDINE PO, Take  by mouth., Disp: , Rfl:   •  Multiple Vitamins-Minerals (MULTIVITAMIN ADULT PO), Take  by mouth., Disp: , Rfl:   •  Dulaglutide (TRULICITY) 1.5 MG/0.5ML Solution Pen-injector, Inject 0.5 mL as instructed every 7 days., Disp: 4 PEN, Rfl: 6  •  atorvastatin (LIPITOR) 20 MG Tab, Take 1 Tab by mouth every day., Disp: 90 Tab, Rfl: 3  •  Blood Glucose Monitoring Suppl (ACCU-CHEK JAMIE PLUS) w/Device Kit, 1 Kit by Does not apply route 4 times a day. (Patient not taking: Reported on 12/18/2018), Disp: 1 Kit, Rfl: 1  •  glucose blood (ACCU-CHEK JAMIE PLUS) strip, 1 Strip by Other route 4 times a day. (Patient not taking: Reported on 12/18/2018), Disp: 150 Strip, Rfl: 11  •  glucose blood (ACCU-CHEK SMARTVIEW) strip, 1 Strip by Other route 2 times a day. (Patient not taking: Reported on  "12/18/2018), Disp: 60 Strip, Rfl: 11  •  Dapagliflozin-Metformin HCl ER (XIGDUO XR)  MG TABLET SR 24 HR, Take 1 Tab by mouth every morning. (Patient not taking: Reported on 12/18/2018), Disp: 30 Tab, Rfl: 11        Physical Examination:   Vital signs: /72 (BP Location: Left arm, Patient Position: Sitting, BP Cuff Size: Adult)   Pulse 85   Ht 1.753 m (5' 9.02\")   Wt 105.8 kg (233 lb 3.2 oz)   SpO2 95%   BMI 34.42 kg/m²   General: No distress, cooperative, well dressed and well nourished.   Eyes: No scleral icterus or discharge, No hyposphagma  ENMT: Normal on external inspection of nose, lips, No nasal drainage   Neck: No abnormal masses on inspection  Resp: Normal effort, Bilateral clear to auscultation, No wheezing, No rales  CVS: Regular rate and rhythm, S1 S2 normal, No murmur. No gallop  Extremities: No edema bilateral extremities  Neuro: Alert and oriented  Skin: No rash, No Ulcers  Psych: Normal mood and affect      Assessment and Plan:    1. Diabetes mellitus type 2 in obese (HCC)    Now on:  1.   Trulicity 1.5 (once a week on Thursday)   2.   Xigduo 10/1000 one in the Am (started 9/11/18)  3.   Actos 30mg one a day (stop)      Return in about 3 months (around 3/18/2019).    Blood glucose log: Check BG in the morning when wake up, before lunch or dinner and before bed.  So three times a day.  Always bring BG diary to the next office visit.     Thank you kindly for allowing me to participate in the diabetes care plan for this patient.    Hiro Sanchez PA-C, BC-ADM  Board Certified - [facility]  12/18/18    CC:   Lucero Moyer M.D.    "

## 2018-12-19 LAB
HBA1C MFR BLD: 5.9 % (ref ?–5.8)
INT CON NEG: NORMAL
INT CON POS: NORMAL

## 2019-03-14 ENCOUNTER — TELEPHONE (OUTPATIENT)
Dept: ENDOCRINOLOGY | Facility: MEDICAL CENTER | Age: 74
End: 2019-03-14

## 2019-03-14 NOTE — TELEPHONE ENCOUNTER
1. Caller Name: Lucero Palm                                         Call Back Number: [phone] (home)         Patient approves a detailed voicemail message: yes    Patient called stating she is visiting family in [facility] and forgot her Xigduo medication.  I called pharmacy in [place] and was able to get her approved for patient to get medication there.  Called patient back to notify her, patient appreciated the call and time.  MILTON

## 2019-03-26 ENCOUNTER — OFFICE VISIT (OUTPATIENT)
Dept: ENDOCRINOLOGY | Facility: MEDICAL CENTER | Age: 74
End: 2019-03-26
Payer: MEDICARE

## 2019-03-26 VITALS
SYSTOLIC BLOOD PRESSURE: 114 MMHG | BODY MASS INDEX: 34 KG/M2 | HEIGHT: 69 IN | OXYGEN SATURATION: 93 % | WEIGHT: 229.6 LBS | HEART RATE: 92 BPM | DIASTOLIC BLOOD PRESSURE: 76 MMHG

## 2019-03-26 DIAGNOSIS — E66.9 DIABETES MELLITUS TYPE 2 IN OBESE (HCC): ICD-10-CM

## 2019-03-26 DIAGNOSIS — E11.69 DIABETES MELLITUS TYPE 2 IN OBESE (HCC): ICD-10-CM

## 2019-03-26 PROCEDURE — 99214 OFFICE O/P EST MOD 30 MIN: CPT | Performed by: PHYSICIAN ASSISTANT

## 2019-03-26 RX ORDER — DAPAGLIFLOZIN AND METFORMIN HYDROCHLORIDE 10; 1000 MG/1; MG/1
1 TABLET, FILM COATED, EXTENDED RELEASE ORAL EVERY MORNING
Qty: 30 TAB | Refills: 11 | Status: SHIPPED | OUTPATIENT
Start: 2019-03-26 | End: 2019-05-28 | Stop reason: SDUPTHER

## 2019-03-26 NOTE — PROGRESS NOTES
Return to office Patient Consult Note  Referred by: Lucero Moyer    Reason for consult: Diabetes Management Type 2    HPI:  Lucero Palm is a 73 y.o. old patient who is seeing us today for diabetes care.  This is a pleasant patient with diabetes and I appreciate the opportunity to participate in the care of this patient.    Labs of 3/26/19 HbA1c is 6.1  Labs of 12/18/18 HbA1c is 5.9  Labs of 8/9/18 HbA1c is 8.0  Labs of 5/31/18  GFR>60, HDL 47, LDL 72, HbA1c is 7.9    BG Diary:3/26/2019  In the AM:  No log today    BG Diary:10/16/2018  In the AM: 35, 150, 54, 142, 140, 165, 139, 160, 132  Before Bed: 187, 159, 174, 159, 123, 139, 174     BG Diary:9/25/2018  In the AM: 123, 144, 131, 144, 123, 135, 140  Before Bed: 193, 169, 144, 145, 151          1. Diabetes mellitus type 2 in obese (HCC)  This is a new patient with me on 8/9/18  She was on:  1.  Metformin  2. Tradjenta (not taking)  3. Jardiance (not taking)     Stop:  Tradjenta (Stop)     Now on:  1.   Trulicity 1.5 (once a week on Thursday)   2.   Xigduo 10/1000 one in the Am (started 9/11/18)  3.   Actos 30mg one a day (Stopped on 12/18/19)       ROS:   Constitutional: No night sweats.  Eyes:  No visual changes.  Cardiac: No chest pain, No palpitations or racing heart rate.  Resp: No shortness of breath, No cough,   Gi: No Diarrhea    All other systems were reviewed and were/are negative.  The ROS was revised/revisited during this office visit from the patients first office visit with me on 8/9/18  Please review the full ROS during the first office visit.    Past Medical History:  Patient Active Problem List    Diagnosis Date Noted   • Sciatica of right side without back pain 05/31/2018   • Dyslipidemia 11/07/2017   • Diabetes mellitus type 2 in obese (HCC) 11/07/2017   • Gastroesophageal reflux disease without esophagitis 11/07/2017   • Obesity (BMI 30-39.9) 11/07/2017       Past Surgical History:  Past Surgical History:   Procedure Laterality Date    • LITHOTRIPSY  2015   • OTHER  2011    D&C   • INGUINAL HERNIA REPAIR Left 2009   • OTHER  1972    back surgery       Allergies:  Xigduo xr [dapagliflozin-metformin hcl er]    Social History:  Social History     Social History   • Marital status:      Spouse name: N/A   • Number of children: N/A   • Years of education: N/A     Occupational History   • Not on file.     Social History Main Topics   • Smoking status: Never Smoker   • Smokeless tobacco: Never Used   • Alcohol use 0.6 oz/week     1 Glasses of wine per week      Comment: 2-3 per month   • Drug use: No   • Sexual activity: No     Other Topics Concern   • Not on file     Social History Narrative   • No narrative on file       Family History:  Family History   Problem Relation Age of Onset   • Breast Cancer Mother 42   • Breast Cancer Sister    • Heart Disease Neg Hx        Medications:    Current Outpatient Prescriptions:   •  IBUPROFEN-FAMOTIDINE PO, Take  by mouth., Disp: , Rfl:   •  Dulaglutide (TRULICITY) 1.5 MG/0.5ML Solution Pen-injector, Inject 0.5 mL as instructed every 7 days., Disp: 4 PEN, Rfl: 11  •  Dapagliflozin-Metformin HCl ER (XIGDUO XR)  MG TABLET SR 24 HR, Take 1 Tab by mouth every morning., Disp: 30 Tab, Rfl: 11  •  Blood Glucose Monitoring Suppl (ACCU-CHEK JAMIE PLUS) w/Device Kit, 1 Kit by Does not apply route 4 times a day. (Patient not taking: Reported on 12/18/2018), Disp: 1 Kit, Rfl: 1  •  glucose blood (ACCU-CHEK JAMIE PLUS) strip, 1 Strip by Other route 4 times a day. (Patient not taking: Reported on 12/18/2018), Disp: 150 Strip, Rfl: 11  •  glucose blood (ACCU-CHEK SMARTVIEW) strip, 1 Strip by Other route 2 times a day. (Patient not taking: Reported on 12/18/2018), Disp: 60 Strip, Rfl: 11  •  Lancet Devices Misc, 1 Applicator by Does not apply route 2 times a day, with meals., Disp: 50 Applicator, Rfl: 11  •  IBUPROFEN-FAMOTIDINE PO, Take  by mouth., Disp: , Rfl:   •  Multiple Vitamins-Minerals (MULTIVITAMIN ADULT  "PO), Take  by mouth., Disp: , Rfl:   •  atorvastatin (LIPITOR) 20 MG Tab, Take 1 Tab by mouth every day., Disp: 90 Tab, Rfl: 3        Physical Examination:   Vital signs: /76 (BP Location: Right arm, Patient Position: Sitting, BP Cuff Size: Adult)   Pulse 92   Ht 1.753 m (5' 9.02\")   Wt 104.1 kg (229 lb 9.6 oz)   SpO2 93%   BMI 33.89 kg/m²   General: No distress, cooperative, well dressed and well nourished.   Eyes: No scleral icterus or discharge, No hyposphagma  ENMT: Normal on external inspection of nose, lips, No nasal drainage   Neck: No abnormal masses on inspection  Resp: Normal effort, Bilateral clear to auscultation, No wheezing, No rales  CVS: Regular rate and rhythm, S1 S2 normal, No murmur. No gallop  Extremities: No edema bilateral extremities  Neuro: Alert and oriented  Skin: No rash, No Ulcers  Psych: Normal mood and affect      Assessment and Plan:    1. Diabetes mellitus type 2 in obese (HCC)    Now on:  1.   Trulicity 1.5 (once a week on Thursday)   2.   Xigduo 10/1000 one in the Am (started 9/11/18)  3.   Actos 30mg one a day (Stopped on 12/18/19)      Return in about 1 year (around 3/26/2020).      Thank you kindly for allowing me to participate in the diabetes care plan for this patient.    Hiro Sanchez PA-C, BC-ADM  Board Certified - [facility]  03/26/19    CC:   Lucero Moyer    "

## 2019-03-29 ENCOUNTER — HOSPITAL ENCOUNTER (OUTPATIENT)
Dept: LAB | Facility: MEDICAL CENTER | Age: 74
End: 2019-03-29
Attending: INTERNAL MEDICINE
Payer: MEDICARE

## 2019-03-29 DIAGNOSIS — E11.69 DIABETES MELLITUS TYPE 2 IN OBESE (HCC): ICD-10-CM

## 2019-03-29 DIAGNOSIS — E66.9 DIABETES MELLITUS TYPE 2 IN OBESE (HCC): ICD-10-CM

## 2019-03-29 DIAGNOSIS — E78.5 DYSLIPIDEMIA: ICD-10-CM

## 2019-03-29 LAB
ALBUMIN SERPL BCP-MCNC: 4 G/DL (ref 3.2–4.9)
ALBUMIN/GLOB SERPL: 1.6 G/DL
ALP SERPL-CCNC: 56 U/L (ref 30–99)
ALT SERPL-CCNC: 17 U/L (ref 2–50)
ANION GAP SERPL CALC-SCNC: 7 MMOL/L (ref 0–11.9)
AST SERPL-CCNC: 17 U/L (ref 12–45)
BILIRUB SERPL-MCNC: 0.5 MG/DL (ref 0.1–1.5)
BUN SERPL-MCNC: 18 MG/DL (ref 8–22)
CALCIUM SERPL-MCNC: 9 MG/DL (ref 8.5–10.5)
CHLORIDE SERPL-SCNC: 107 MMOL/L (ref 96–112)
CHOLEST SERPL-MCNC: 219 MG/DL (ref 100–199)
CO2 SERPL-SCNC: 25 MMOL/L (ref 20–33)
CREAT SERPL-MCNC: 0.51 MG/DL (ref 0.5–1.4)
EST. AVERAGE GLUCOSE BLD GHB EST-MCNC: 131 MG/DL
FASTING STATUS PATIENT QL REPORTED: NORMAL
GLOBULIN SER CALC-MCNC: 2.5 G/DL (ref 1.9–3.5)
GLUCOSE SERPL-MCNC: 114 MG/DL (ref 65–99)
HBA1C MFR BLD: 6.2 % (ref 0–5.6)
HDLC SERPL-MCNC: 46 MG/DL
LDLC SERPL CALC-MCNC: 124 MG/DL
POTASSIUM SERPL-SCNC: 4.1 MMOL/L (ref 3.6–5.5)
PROT SERPL-MCNC: 6.5 G/DL (ref 6–8.2)
SODIUM SERPL-SCNC: 139 MMOL/L (ref 135–145)
TRIGL SERPL-MCNC: 245 MG/DL (ref 0–149)

## 2019-03-29 PROCEDURE — 83036 HEMOGLOBIN GLYCOSYLATED A1C: CPT | Mod: GA

## 2019-03-29 PROCEDURE — 36415 COLL VENOUS BLD VENIPUNCTURE: CPT

## 2019-03-29 PROCEDURE — 80053 COMPREHEN METABOLIC PANEL: CPT

## 2019-03-29 PROCEDURE — 80061 LIPID PANEL: CPT

## 2019-04-01 NOTE — PROGRESS NOTES
Samson Barker,  Your cholesterol is up a lot, and is not at goal. Did you stop your statin medication? If you have been taking it, it isn't enough and we need to increase the dose. Let me know what you have been taking.  Lucero Moyer MD

## 2019-05-28 ENCOUNTER — OFFICE VISIT (OUTPATIENT)
Dept: MEDICAL GROUP | Facility: MEDICAL CENTER | Age: 74
End: 2019-05-28
Payer: MEDICARE

## 2019-05-28 VITALS
HEIGHT: 70 IN | RESPIRATION RATE: 14 BRPM | TEMPERATURE: 98.3 F | WEIGHT: 234.2 LBS | DIASTOLIC BLOOD PRESSURE: 72 MMHG | SYSTOLIC BLOOD PRESSURE: 114 MMHG | BODY MASS INDEX: 33.53 KG/M2 | HEART RATE: 97 BPM | OXYGEN SATURATION: 94 %

## 2019-05-28 DIAGNOSIS — E66.9 OBESITY (BMI 30-39.9): ICD-10-CM

## 2019-05-28 DIAGNOSIS — E11.69 DIABETES MELLITUS TYPE 2 IN OBESE (HCC): ICD-10-CM

## 2019-05-28 DIAGNOSIS — E78.5 DYSLIPIDEMIA: ICD-10-CM

## 2019-05-28 DIAGNOSIS — E66.9 DIABETES MELLITUS TYPE 2 IN OBESE (HCC): ICD-10-CM

## 2019-05-28 DIAGNOSIS — M54.31 SCIATICA OF RIGHT SIDE WITHOUT BACK PAIN: ICD-10-CM

## 2019-05-28 PROCEDURE — 99214 OFFICE O/P EST MOD 30 MIN: CPT | Performed by: INTERNAL MEDICINE

## 2019-05-28 RX ORDER — DAPAGLIFLOZIN AND METFORMIN HYDROCHLORIDE 10; 1000 MG/1; MG/1
1 TABLET, FILM COATED, EXTENDED RELEASE ORAL EVERY MORNING
Qty: 90 TAB | Refills: 2 | Status: SHIPPED | OUTPATIENT
Start: 2019-05-28 | End: 2021-03-29

## 2019-05-28 RX ORDER — ATORVASTATIN CALCIUM 20 MG/1
20 TABLET, FILM COATED ORAL DAILY
Qty: 90 TAB | Refills: 3 | Status: SHIPPED | OUTPATIENT
Start: 2019-05-28 | End: 2021-03-29

## 2019-05-28 ASSESSMENT — PATIENT HEALTH QUESTIONNAIRE - PHQ9: CLINICAL INTERPRETATION OF PHQ2 SCORE: 0

## 2019-05-28 NOTE — PROGRESS NOTES
"Chief Complaint   Patient presents with   • Follow-Up     6 month      Chief complaint: 6-month follow-up of diabetes, hyperlipidemia    HISTORY OF PRESENT ILLNESS: Patient is a 73 y.o. female patient who presents today to discuss the evaluation and management of:          1. Dyslipidemia    Results for NANDO PRITCHARD (MRN [id number]) as of 5/28/2019 13:59   Ref. Range 5/31/2018 08:13 8/9/2018 09:32 12/18/2018 10:45 3/29/2019 08:58   Cholesterol,Tot Latest Ref Range: 100 - 199 mg/dL 156   219 (H)   Triglycerides Latest Ref Range: 0 - 149 mg/dL 183 (H)   245 (H)   HDL Latest Ref Range: >=40 mg/dL 47   46   LDL Latest Ref Range: <100 mg/dL 72   124 (H)     Patient had been on atorvastatin 20 mg daily, but she stopped it about 6 months ago as her daughter was concerned about the relationship between statins and developing dementia.  Unfortunately, her LDL went up over 50 points.  Patient had a CT cardiac calcium score in November 2017 which revealed significant plaque and \"moderately high\" cardiovascular risk.  Patient also has controlled diabetes.    2. Diabetes mellitus type 2 in obese (HCC)    Patient is currently on Trulicity weekly, and Xiduo  mg every morning.  Her recent hemoglobin A1c was 6.2.  Patient gets regular eye exams, and has no current neuropathy symptoms.    3. Obesity (BMI 30-39.9)    Patient's weight remains stable with BMI 33.  She is not doing any regular exercise.    4. Sciatica of right side without back pain    Chronic problem, patient takes ibuprofen/famotidine combination daily.  She had physical therapy many years ago, is not interested in trying again.  She is not interested in having any sort of intervention, at this time imaging is not indicated.        Patient Active Problem List    Diagnosis Date Noted   • Sciatica of right side without back pain 05/31/2018   • Dyslipidemia 11/07/2017   • Diabetes mellitus type 2 in obese (HCC) 11/07/2017   • Gastroesophageal reflux disease " "without esophagitis 11/07/2017   • Obesity (BMI 30-39.9) 11/07/2017        Allergies:Xigduo xr [dapagliflozin-metformin hcl er]    Current meds including changes today  Current Outpatient Prescriptions   Medication Sig Dispense Refill   • Cyanocobalamin (VITAMIN B 12 PO) Take  by mouth.     • BIOTIN PO Take  by mouth.     • atorvastatin (LIPITOR) 20 MG Tab Take 1 Tab by mouth every day. 90 Tab 3   • Dapagliflozin-Metformin HCl ER (XIGDUO XR)  MG TABLET SR 24 HR Take 1 Tab by mouth every morning. 90 Tab 2   • Dulaglutide (TRULICITY) 1.5 MG/0.5ML Solution Pen-injector Inject 0.5 mL as instructed every 7 days. 12 PEN 3   • Lancet Devices Misc 1 Applicator by Does not apply route 2 times a day, with meals. 50 Applicator 11   • IBUPROFEN-FAMOTIDINE PO Take  by mouth.     • Multiple Vitamins-Minerals (MULTIVITAMIN ADULT PO) Take  by mouth.     • Blood Glucose Monitoring Suppl (ACCU-CHEK JAMIE PLUS) w/Device Kit 1 Kit by Does not apply route 4 times a day. (Patient not taking: Reported on 12/18/2018) 1 Kit 1     No current facility-administered medications for this visit.      Social History   Substance Use Topics   • Smoking status: Never Smoker   • Smokeless tobacco: Never Used   • Alcohol use 0.6 oz/week     1 Glasses of wine per week      Comment: 2-3 per month     Social History     Social History Narrative   • No narrative on file       Family History   Problem Relation Age of Onset   • Breast Cancer Mother 42   • Breast Cancer Sister    • Heart Disease Neg Hx        Review of Systems:  No chest pain, No shortness of breath, No dyspnea on exertion  Gastrointestinal ROS: No abdominal pain, No nausea, vomiting, diarrhea, or constipation        Exam:      /72 (BP Location: Left arm, Patient Position: Sitting, BP Cuff Size: Large adult)   Pulse 97   Temp 36.8 °C (98.3 °F) (Temporal)   Resp 14   Ht 1.778 m (5' 10\")   Wt 106.2 kg (234 lb 3.2 oz)   SpO2 94%   General: Overweight female in NAD affect and " mood within normal limits  Head is grossly normal.  Neck: Supple without adenopathy  Pulmonary: Clear to ausculation.  Normal effort. No rales, rhonchi, or wheezing.  Cardiovascular: Regular rate and rhythm without murmur.   Extremities: no clubbing, cyanosis, or edema.  Neuro: moves all extremities symmetrically    Please note that this dictation was created using voice recognition software. I have made every reasonable attempt to correct obvious errors, but I expect that there are errors of grammar and possibly content that I did not discover before finalizing the note.    Assessment/Plan:  1. Dyslipidemia    -At this time, due to her diabetes, and high calcium score, I do recommend resuming her statin.  She tolerated well in the past and did not have side effects.  I am not aware of any strong correlation between statin use and developing dementia.  - atorvastatin (LIPITOR) 20 MG Tab; Take 1 Tab by mouth every day.  Dispense: 90 Tab; Refill: 3    2. Diabetes mellitus type 2 in obese (HCC)    -Well-controlled, continue current regimen  - Dapagliflozin-Metformin HCl ER (XIGDUO XR)  MG TABLET SR 24 HR; Take 1 Tab by mouth every morning.  Dispense: 90 Tab; Refill: 2  - Dulaglutide (TRULICITY) 1.5 MG/0.5ML Solution Pen-injector; Inject 0.5 mL as instructed every 7 days.  Dispense: 12 PEN; Refill: 3    3. Obesity (BMI 30-39.9)    Patient acknowledges need to start regular exercise program, and will watch what she eats.    4. Sciatica of right side without back pain    Declines any further intervention    Followup: Patient commutes from [place], she will seek to establish with a PCP closer to her either in [place], or with Dr. Hollis in [facility].

## 2019-06-05 ENCOUNTER — TELEPHONE (OUTPATIENT)
Dept: ENDOCRINOLOGY | Facility: MEDICAL CENTER | Age: 74
End: 2019-06-05

## 2019-06-05 NOTE — TELEPHONE ENCOUNTER
1. Caller Name: Lucero                                         Call Back Number: [phone] (home)         Patient approves a detailed voicemail message: no    patient is in the medicare gap. it will be $1000 for her medicaiton. Told her she can  samples of the xigduo 10/1000 tab and trulicity.

## 2019-06-13 ENCOUNTER — TELEPHONE (OUTPATIENT)
Dept: ENDOCRINOLOGY | Facility: MEDICAL CENTER | Age: 74
End: 2019-06-13

## 2019-06-13 NOTE — TELEPHONE ENCOUNTER
VOICEMAIL  1. Caller Name: Lucero Palm                      Call Back Number: [phone] (home)     2. Message: injected Trulicity into finger on accident, wasn't wearing glasses, now her finger is swelling. What should she do     3. Patient approves office to leave a detailed voicemail/MyChart message: yes      Spoke to Hiro regarding this, he advised that she let it be. To put ice on it or anything because that will constrict the blood flow. It will eventually dissipate throughout the day and the swelling will go down. Her BG will most likely be a little high today but that's okay, don't give her another dose today. She can take her injection tomorrow and make sure that she does it the right way. Her dose today went into muscle and that wont get into her system very well.       Phone Number Called: [phone] (home)     Call outcome: spoke to patient regarding message below    Message: Informed her of the conversation I had with Hiro. She understood and will take her regular dose tomorrow. She will let us know if anything gets worse or has any other questions.

## 2019-07-19 ENCOUNTER — TELEPHONE (OUTPATIENT)
Dept: ENDOCRINOLOGY | Facility: MEDICAL CENTER | Age: 74
End: 2019-07-19

## 2019-07-19 NOTE — TELEPHONE ENCOUNTER
VOICEMAIL  1. Caller Name: Lucero Palm                      Call Back Number: [phone] (home)     2. Message: Call back regarding xigduo side effect    3. Patient approves office to leave a detailed voicemail/MyChart message: yes    Phone Number Called: [phone] (home)     Call outcome: spoke to patient regarding message below    Message: she has been having diarrhea with the Xigduo and stopped taking it 2 weeks ago because it got bad and the diarrhea cleared up after she sopped taking it. She is wondering if she should try something else. Informed her that I will send a message to Hiro and get back to her.     Please advise

## 2019-07-19 NOTE — TELEPHONE ENCOUNTER
Phone Number Called: [phone] (home)     Call outcome: left message for patient to call back regarding message below    Message: Lm informing her that I have placed Farxiga 10mg aside for her to  (take one tablet daily)

## 2020-01-20 ENCOUNTER — OFFICE VISIT (OUTPATIENT)
Dept: ENDOCRINOLOGY | Facility: MEDICAL CENTER | Age: 75
End: 2020-01-20
Payer: MEDICARE

## 2020-01-20 VITALS
HEART RATE: 91 BPM | DIASTOLIC BLOOD PRESSURE: 66 MMHG | BODY MASS INDEX: 34.36 KG/M2 | SYSTOLIC BLOOD PRESSURE: 124 MMHG | OXYGEN SATURATION: 98 % | WEIGHT: 232 LBS | HEIGHT: 69 IN

## 2020-01-20 DIAGNOSIS — E66.9 DIABETES MELLITUS TYPE 2 IN OBESE: ICD-10-CM

## 2020-01-20 DIAGNOSIS — E11.69 DIABETES MELLITUS TYPE 2 IN OBESE: ICD-10-CM

## 2020-01-20 LAB
HBA1C MFR BLD: 6.4 % (ref 0–5.6)
INT CON NEG: NEGATIVE
INT CON POS: POSITIVE

## 2020-01-20 PROCEDURE — 99214 OFFICE O/P EST MOD 30 MIN: CPT | Performed by: PHYSICIAN ASSISTANT

## 2020-01-20 PROCEDURE — 83036 HEMOGLOBIN GLYCOSYLATED A1C: CPT | Performed by: PHYSICIAN ASSISTANT

## 2020-01-20 NOTE — PATIENT INSTRUCTIONS
Now on:  1.   Trulicity 1.5 (once a week on Thursday)   2.   Xigduo 10/1000 one in the Am (STOP)  3.   Actos 30mg one a day (Stopped on 12/18/19)

## 2020-01-20 NOTE — PROGRESS NOTES
Return to office Patient Consult Note  Referred by: Lucero Moyer M.D.    Reason for consult: Diabetes Management Type 2    HPI:  Lucero Palm is a 74 y.o. old patient who is seeing us today for diabetes care.  This is a pleasant patient with diabetes and I appreciate the opportunity to participate in the care of this patient.    Labs of 1/20/2020 HbA1c is 6.4   Labs of 3/26/19 HbA1c is 6.1  Labs of 12/18/18 HbA1c is 5.9  Labs of 8/9/18 HbA1c is 8.0  Labs of 5/31/18  GFR>60, HDL 47, LDL 72, HbA1c is 7.9    BG Diary:1/20/2020  In the AM:  No log      1. Diabetes mellitus type 2 in obese (HCC)  This is a new patient with me on 8/9/18  She was on:  1.  Metformin  2. Tradjenta (not taking)  3. Jardiance (not taking)     Stop:  Tradjenta (Stop)     Now on:  1.   Trulicity 1.5 (once a week on Thursday)   2.   Xigduo 10/1000 one in the Am (started 9/11/18)  3.   Actos 30mg one a day (Stopped on 12/18/19)      ROS:   Constitutional: No night sweats.  Eyes:  No visual changes.  Cardiac: No chest pain, No palpitations or racing heart rate.  Resp: No shortness of breath, No cough,   Gi: No Diarrhea    All other systems were reviewed and were/are negative.      Past Medical History:  Patient Active Problem List    Diagnosis Date Noted   • Sciatica of right side without back pain 05/31/2018   • Dyslipidemia 11/07/2017   • Diabetes mellitus type 2 in obese (HCC) 11/07/2017   • Gastroesophageal reflux disease without esophagitis 11/07/2017   • Obesity (BMI 30-39.9) 11/07/2017       Past Surgical History:  Past Surgical History:   Procedure Laterality Date   • LITHOTRIPSY  2015   • OTHER  2011    D&C   • INGUINAL HERNIA REPAIR Left 2009   • OTHER  1972    back surgery       Allergies:  Xigduo xr [dapagliflozin-metformin hcl er]    Social History:  Social History     Socioeconomic History   • Marital status:      Spouse name: Not on file   • Number of children: Not on file   • Years of education: Not on file   •  Highest education level: Not on file   Occupational History   • Not on file   Social Needs   • Financial resource strain: Not on file   • Food insecurity:     Worry: Not on file     Inability: Not on file   • Transportation needs:     Medical: Not on file     Non-medical: Not on file   Tobacco Use   • Smoking status: Never Smoker   • Smokeless tobacco: Never Used   Substance and Sexual Activity   • Alcohol use: Yes     Alcohol/week: 0.6 oz     Types: 1 Glasses of wine per week     Comment: 2-3 per month   • Drug use: No   • Sexual activity: Never   Lifestyle   • Physical activity:     Days per week: Not on file     Minutes per session: Not on file   • Stress: Not on file   Relationships   • Social connections:     Talks on phone: Not on file     Gets together: Not on file     Attends [employer] service: Not on file     Active member of club or organization: Not on file     Attends meetings of clubs or organizations: Not on file     Relationship status: Not on file   • Intimate partner violence:     Fear of current or ex partner: Not on file     Emotionally abused: Not on file     Physically abused: Not on file     Forced sexual activity: Not on file   Other Topics Concern   • Not on file   Social History Narrative   • Not on file       Family History:  Family History   Problem Relation Age of Onset   • Breast Cancer Mother 42   • Breast Cancer Sister    • Heart Disease Neg Hx        Medications:    Current Outpatient Medications:   •  Dulaglutide (TRULICITY) 1.5 MG/0.5ML Solution Pen-injector, Inject 0.5 mL as instructed every 7 days., Disp: 12 PEN, Rfl: 4  •  Cyanocobalamin (VITAMIN B 12 PO), Take  by mouth., Disp: , Rfl:   •  BIOTIN PO, Take  by mouth., Disp: , Rfl:   •  atorvastatin (LIPITOR) 20 MG Tab, Take 1 Tab by mouth every day., Disp: 90 Tab, Rfl: 3  •  Dapagliflozin-Metformin HCl ER (XIGDUO XR)  MG TABLET SR 24 HR, Take 1 Tab by mouth every morning., Disp: 90 Tab, Rfl: 2  •  Blood Glucose Monitoring  "Suppl (ACCU-CHEK JAMIE PLUS) w/Device Kit, 1 Kit by Does not apply route 4 times a day. (Patient not taking: Reported on 12/18/2018), Disp: 1 Kit, Rfl: 1  •  Lancet Devices Misc, 1 Applicator by Does not apply route 2 times a day, with meals., Disp: 50 Applicator, Rfl: 11  •  IBUPROFEN-FAMOTIDINE PO, Take  by mouth., Disp: , Rfl:   •  Multiple Vitamins-Minerals (MULTIVITAMIN ADULT PO), Take  by mouth., Disp: , Rfl:         Physical Examination:   Vital signs: /66 (BP Location: Left arm, Patient Position: Sitting)   Pulse 91   Ht 1.753 m (5' 9\")   Wt 105.2 kg (232 lb)   SpO2 98%   BMI 34.26 kg/m²   General: No distress, cooperative, well dressed and well nourished.   Eyes: No scleral icterus or discharge, No hyposphagma  ENMT: Normal on external inspection of nose, lips, No nasal drainage   Neck: No abnormal masses on inspection  Resp: Normal effort, Bilateral clear to auscultation, No wheezing, No rales  CVS: Regular rate and rhythm, S1 S2 normal, No murmur. No gallop  Extremities: No edema bilateral extremities  Neuro: Alert and oriented  Skin: No rash, No Ulcers  Psych: Normal mood and affect      Assessment and Plan:    1. Diabetes mellitus type 2 in obese (HCC)    Now on:  1.   Trulicity 1.5 (once a week on Thursday)   2.   Xigduo 10/1000 one in the Am (STOP)  3.   Actos 30mg one a day (Stopped on 12/18/19)     Return in about 1 year (around 1/20/2021).    Thank you kindly for allowing me to participate in the diabetes care plan for this patient.    Hiro Sanchez PA-C, BC-ADM  Board Certified - [facility]  01/20/20    CC:   Lucero Moyer M.D.    "

## 2020-02-27 ENCOUNTER — TELEPHONE (OUTPATIENT)
Dept: ENDOCRINOLOGY | Facility: MEDICAL CENTER | Age: 75
End: 2020-02-27

## 2020-02-27 NOTE — TELEPHONE ENCOUNTER
Pt is calling for additional samples of Trulicity 1.5mg - She does not have future appt she is following Hiro Sanchez  Please call pt to notify when she can .      Thanks

## 2020-02-28 NOTE — TELEPHONE ENCOUNTER
Spoke with patient, confirm dose and will provide her with 2 boxes as she will be scheduling with Hiro at his new office, I asked her to please give us a call if she needed anything else  2 Boxes set aside for patient.

## 2020-06-09 ENCOUNTER — HOSPITAL ENCOUNTER (OUTPATIENT)
Dept: LAB | Facility: MEDICAL CENTER | Age: 75
End: 2020-06-09
Attending: INTERNAL MEDICINE
Payer: MEDICARE

## 2020-06-09 LAB
ALBUMIN SERPL BCP-MCNC: 3.9 G/DL (ref 3.2–4.9)
ALBUMIN/GLOB SERPL: 1.3 G/DL
ALP SERPL-CCNC: 65 U/L (ref 30–99)
ALT SERPL-CCNC: 28 U/L (ref 2–50)
ANION GAP SERPL CALC-SCNC: 12 MMOL/L (ref 7–16)
AST SERPL-CCNC: 20 U/L (ref 12–45)
BASOPHILS # BLD AUTO: 0.6 % (ref 0–1.8)
BASOPHILS # BLD: 0.07 K/UL (ref 0–0.12)
BILIRUB SERPL-MCNC: 0.6 MG/DL (ref 0.1–1.5)
BUN SERPL-MCNC: 14 MG/DL (ref 8–22)
CALCIUM SERPL-MCNC: 9.3 MG/DL (ref 8.5–10.5)
CHLORIDE SERPL-SCNC: 100 MMOL/L (ref 96–112)
CHOLEST SERPL-MCNC: 207 MG/DL (ref 100–199)
CO2 SERPL-SCNC: 23 MMOL/L (ref 20–33)
CREAT SERPL-MCNC: 0.55 MG/DL (ref 0.5–1.4)
CREAT UR-MCNC: 132.55 MG/DL
CRP SERPL HS-MCNC: 66.9 MG/L (ref 0–7.5)
D DIMER PPP IA.FEU-MCNC: 2.39 UG/ML (FEU) (ref 0–0.5)
EOSINOPHIL # BLD AUTO: 0.07 K/UL (ref 0–0.51)
EOSINOPHIL NFR BLD: 0.6 % (ref 0–6.9)
ERYTHROCYTE [DISTWIDTH] IN BLOOD BY AUTOMATED COUNT: 42.8 FL (ref 35.9–50)
ERYTHROCYTE [SEDIMENTATION RATE] IN BLOOD BY WESTERGREN METHOD: 15 MM/HOUR (ref 0–30)
EST. AVERAGE GLUCOSE BLD GHB EST-MCNC: 171 MG/DL
GLOBULIN SER CALC-MCNC: 3 G/DL (ref 1.9–3.5)
GLUCOSE SERPL-MCNC: 167 MG/DL (ref 65–99)
HBA1C MFR BLD: 7.6 % (ref 0–5.6)
HCT VFR BLD AUTO: 46.9 % (ref 37–47)
HDLC SERPL-MCNC: 43 MG/DL
HGB BLD-MCNC: 15.1 G/DL (ref 12–16)
IMM GRANULOCYTES # BLD AUTO: 0.03 K/UL (ref 0–0.11)
IMM GRANULOCYTES NFR BLD AUTO: 0.3 % (ref 0–0.9)
LDLC SERPL CALC-MCNC: 133 MG/DL
LYMPHOCYTES # BLD AUTO: 2.62 K/UL (ref 1–4.8)
LYMPHOCYTES NFR BLD: 22.6 % (ref 22–41)
MCH RBC QN AUTO: 28.6 PG (ref 27–33)
MCHC RBC AUTO-ENTMCNC: 32.2 G/DL (ref 33.6–35)
MCV RBC AUTO: 88.8 FL (ref 81.4–97.8)
MICROALBUMIN UR-MCNC: <1.2 MG/DL
MICROALBUMIN/CREAT UR: NORMAL MG/G (ref 0–30)
MONOCYTES # BLD AUTO: 1.24 K/UL (ref 0–0.85)
MONOCYTES NFR BLD AUTO: 10.7 % (ref 0–13.4)
NEUTROPHILS # BLD AUTO: 7.57 K/UL (ref 2–7.15)
NEUTROPHILS NFR BLD: 65.2 % (ref 44–72)
NRBC # BLD AUTO: 0 K/UL
NRBC BLD-RTO: 0 /100 WBC
PLATELET # BLD AUTO: 161 K/UL (ref 164–446)
PMV BLD AUTO: 10.6 FL (ref 9–12.9)
POTASSIUM SERPL-SCNC: 4.1 MMOL/L (ref 3.6–5.5)
PROT SERPL-MCNC: 6.9 G/DL (ref 6–8.2)
RBC # BLD AUTO: 5.28 M/UL (ref 4.2–5.4)
SODIUM SERPL-SCNC: 135 MMOL/L (ref 135–145)
TRIGL SERPL-MCNC: 155 MG/DL (ref 0–149)
URATE SERPL-MCNC: 5.6 MG/DL (ref 1.9–8.2)
WBC # BLD AUTO: 11.6 K/UL (ref 4.8–10.8)

## 2020-06-09 PROCEDURE — 80053 COMPREHEN METABOLIC PANEL: CPT

## 2020-06-09 PROCEDURE — 82043 UR ALBUMIN QUANTITATIVE: CPT

## 2020-06-09 PROCEDURE — 84550 ASSAY OF BLOOD/URIC ACID: CPT

## 2020-06-09 PROCEDURE — 82570 ASSAY OF URINE CREATININE: CPT

## 2020-06-09 PROCEDURE — 83036 HEMOGLOBIN GLYCOSYLATED A1C: CPT

## 2020-06-09 PROCEDURE — 86141 C-REACTIVE PROTEIN HS: CPT | Mod: GA

## 2020-06-09 PROCEDURE — 85652 RBC SED RATE AUTOMATED: CPT

## 2020-06-09 PROCEDURE — 85379 FIBRIN DEGRADATION QUANT: CPT

## 2020-06-09 PROCEDURE — 85025 COMPLETE CBC W/AUTO DIFF WBC: CPT

## 2020-06-09 PROCEDURE — 36415 COLL VENOUS BLD VENIPUNCTURE: CPT

## 2020-06-09 PROCEDURE — 80061 LIPID PANEL: CPT

## 2020-08-25 ENCOUNTER — HOSPITAL ENCOUNTER (OUTPATIENT)
Dept: LAB | Facility: MEDICAL CENTER | Age: 75
End: 2020-08-25
Attending: PHYSICIAN ASSISTANT
Payer: MEDICARE

## 2020-08-25 LAB
ALBUMIN SERPL BCP-MCNC: 4 G/DL (ref 3.2–4.9)
ALBUMIN/GLOB SERPL: 1.4 G/DL
ALP SERPL-CCNC: 69 U/L (ref 30–99)
ALT SERPL-CCNC: 18 U/L (ref 2–50)
ANION GAP SERPL CALC-SCNC: 12 MMOL/L (ref 7–16)
AST SERPL-CCNC: 14 U/L (ref 12–45)
BASOPHILS # BLD AUTO: 0.7 % (ref 0–1.8)
BASOPHILS # BLD: 0.06 K/UL (ref 0–0.12)
BILIRUB SERPL-MCNC: 0.4 MG/DL (ref 0.1–1.5)
BUN SERPL-MCNC: 11 MG/DL (ref 8–22)
CALCIUM SERPL-MCNC: 9.5 MG/DL (ref 8.5–10.5)
CHLORIDE SERPL-SCNC: 100 MMOL/L (ref 96–112)
CHOLEST SERPL-MCNC: 186 MG/DL (ref 100–199)
CO2 SERPL-SCNC: 25 MMOL/L (ref 20–33)
CREAT SERPL-MCNC: 0.63 MG/DL (ref 0.5–1.4)
CREAT UR-MCNC: 106.75 MG/DL
EOSINOPHIL # BLD AUTO: 0.22 K/UL (ref 0–0.51)
EOSINOPHIL NFR BLD: 2.5 % (ref 0–6.9)
ERYTHROCYTE [DISTWIDTH] IN BLOOD BY AUTOMATED COUNT: 44.4 FL (ref 35.9–50)
FASTING STATUS PATIENT QL REPORTED: NORMAL
GLOBULIN SER CALC-MCNC: 2.9 G/DL (ref 1.9–3.5)
GLUCOSE SERPL-MCNC: 239 MG/DL (ref 65–99)
HCT VFR BLD AUTO: 48.8 % (ref 37–47)
HDLC SERPL-MCNC: 46 MG/DL
HGB BLD-MCNC: 15.7 G/DL (ref 12–16)
IMM GRANULOCYTES # BLD AUTO: 0.02 K/UL (ref 0–0.11)
IMM GRANULOCYTES NFR BLD AUTO: 0.2 % (ref 0–0.9)
LDLC SERPL CALC-MCNC: 92 MG/DL
LYMPHOCYTES # BLD AUTO: 3.12 K/UL (ref 1–4.8)
LYMPHOCYTES NFR BLD: 35.3 % (ref 22–41)
MCH RBC QN AUTO: 28.6 PG (ref 27–33)
MCHC RBC AUTO-ENTMCNC: 32.2 G/DL (ref 33.6–35)
MCV RBC AUTO: 89.1 FL (ref 81.4–97.8)
MICROALBUMIN UR-MCNC: <1.2 MG/DL
MICROALBUMIN/CREAT UR: NORMAL MG/G (ref 0–30)
MONOCYTES # BLD AUTO: 0.76 K/UL (ref 0–0.85)
MONOCYTES NFR BLD AUTO: 8.6 % (ref 0–13.4)
NEUTROPHILS # BLD AUTO: 4.66 K/UL (ref 2–7.15)
NEUTROPHILS NFR BLD: 52.7 % (ref 44–72)
NRBC # BLD AUTO: 0.03 K/UL
NRBC BLD-RTO: 0.3 /100 WBC
PLATELET # BLD AUTO: 164 K/UL (ref 164–446)
PMV BLD AUTO: 10.7 FL (ref 9–12.9)
POTASSIUM SERPL-SCNC: 4.6 MMOL/L (ref 3.6–5.5)
PROT SERPL-MCNC: 6.9 G/DL (ref 6–8.2)
RBC # BLD AUTO: 5.48 M/UL (ref 4.2–5.4)
SODIUM SERPL-SCNC: 137 MMOL/L (ref 135–145)
T4 FREE SERPL-MCNC: 1.1 NG/DL (ref 0.93–1.7)
TRIGL SERPL-MCNC: 238 MG/DL (ref 0–149)
TSH SERPL DL<=0.005 MIU/L-ACNC: 1.94 UIU/ML (ref 0.38–5.33)
WBC # BLD AUTO: 8.8 K/UL (ref 4.8–10.8)

## 2020-08-25 PROCEDURE — 80053 COMPREHEN METABOLIC PANEL: CPT

## 2020-08-25 PROCEDURE — 85025 COMPLETE CBC W/AUTO DIFF WBC: CPT

## 2020-08-25 PROCEDURE — 82043 UR ALBUMIN QUANTITATIVE: CPT

## 2020-08-25 PROCEDURE — 36415 COLL VENOUS BLD VENIPUNCTURE: CPT

## 2020-08-25 PROCEDURE — 82570 ASSAY OF URINE CREATININE: CPT

## 2020-08-25 PROCEDURE — 80061 LIPID PANEL: CPT

## 2020-08-25 PROCEDURE — 84443 ASSAY THYROID STIM HORMONE: CPT

## 2020-08-25 PROCEDURE — 84439 ASSAY OF FREE THYROXINE: CPT

## 2021-03-29 ENCOUNTER — TELEPHONE (OUTPATIENT)
Dept: MEDICAL GROUP | Facility: LAB | Age: 76
End: 2021-03-29

## 2021-03-29 ENCOUNTER — OFFICE VISIT (OUTPATIENT)
Dept: MEDICAL GROUP | Facility: LAB | Age: 76
End: 2021-03-29
Payer: MEDICARE

## 2021-03-29 VITALS
HEART RATE: 83 BPM | DIASTOLIC BLOOD PRESSURE: 70 MMHG | TEMPERATURE: 96.9 F | OXYGEN SATURATION: 97 % | WEIGHT: 235 LBS | BODY MASS INDEX: 33.64 KG/M2 | SYSTOLIC BLOOD PRESSURE: 110 MMHG | RESPIRATION RATE: 14 BRPM | HEIGHT: 70 IN

## 2021-03-29 DIAGNOSIS — R53.83 FATIGUE, UNSPECIFIED TYPE: ICD-10-CM

## 2021-03-29 DIAGNOSIS — E11.69 DIABETES MELLITUS TYPE 2 IN OBESE: ICD-10-CM

## 2021-03-29 DIAGNOSIS — E66.9 DIABETES MELLITUS TYPE 2 IN OBESE: ICD-10-CM

## 2021-03-29 DIAGNOSIS — E55.9 VITAMIN D DEFICIENCY: ICD-10-CM

## 2021-03-29 DIAGNOSIS — Z76.89 ESTABLISHING CARE WITH NEW DOCTOR, ENCOUNTER FOR: ICD-10-CM

## 2021-03-29 DIAGNOSIS — E78.5 DYSLIPIDEMIA: ICD-10-CM

## 2021-03-29 PROCEDURE — 99214 OFFICE O/P EST MOD 30 MIN: CPT | Performed by: INTERNAL MEDICINE

## 2021-03-29 RX ORDER — GABAPENTIN 300 MG/1
300 CAPSULE ORAL DAILY
COMMUNITY
End: 2021-06-09 | Stop reason: SDUPTHER

## 2021-03-29 RX ORDER — FLASH GLUCOSE SENSOR
KIT MISCELLANEOUS
COMMUNITY
Start: 2021-03-10 | End: 2022-01-12

## 2021-03-29 RX ORDER — ROSUVASTATIN CALCIUM 10 MG/1
10 TABLET, COATED ORAL EVERY EVENING
COMMUNITY
End: 2021-06-09 | Stop reason: SDUPTHER

## 2021-03-29 RX ORDER — BRIMONIDINE TARTRATE, TIMOLOL MALEATE 2; 5 MG/ML; MG/ML
SOLUTION/ DROPS OPHTHALMIC
COMMUNITY
Start: 2021-03-22

## 2021-03-29 RX ORDER — DULAGLUTIDE 4.5 MG/.5ML
INJECTION, SOLUTION SUBCUTANEOUS
COMMUNITY

## 2021-03-29 RX ORDER — ACETAMINOPHEN 325 MG/1
650 TABLET ORAL EVERY 4 HOURS PRN
COMMUNITY
End: 2023-07-19

## 2021-03-29 RX ORDER — GABAPENTIN 300 MG/1
CAPSULE ORAL
COMMUNITY
Start: 2021-03-04 | End: 2021-03-29

## 2021-03-29 RX ORDER — DULAGLUTIDE 4.5 MG/.5ML
INJECTION, SOLUTION SUBCUTANEOUS
COMMUNITY
Start: 2021-03-22 | End: 2021-03-29

## 2021-03-29 ASSESSMENT — FIBROSIS 4 INDEX: FIB4 SCORE: 1.55

## 2021-03-29 ASSESSMENT — PATIENT HEALTH QUESTIONNAIRE - PHQ9: CLINICAL INTERPRETATION OF PHQ2 SCORE: 0

## 2021-03-29 NOTE — TELEPHONE ENCOUNTER
ANNUAL WELLNESS VISIT PRE-VISIT PLANNING    1.  Reviewed notes from the last office visit: Yes    2.  If any orders were ordered or intended to be done prior to visit (i.e. 6 mos follow-up), do we have results/consult notes or has patient scheduled?        •  Labs - Labs ordered, completed on 3/29/21 and results are in chart.  Note: If patient appointment is for lab review and patient did not complete labs, check with provider if OK to reschedule patient until labs completed.       •  Imaging - Imaging was not ordered at last office visit.       •  Referrals - No referrals were ordered at last office visit.    3.  Immunizations were updated in Epic using Reconcile Outside Information activity? Yes    4.  Patient is due for the following Health Maintenance Topics:   Health Maintenance Due   Topic Date Due   • Annual Wellness Visit  Never done   • DIABETES MONOFILAMENT / LE EXAM  Never done   • COLONOSCOPY  Never done   • IMM ZOSTER VACCINES (1 of 2) Never done   • IMM PNEUMOCOCCAL VACCINE: 65+ Years (1 of 1 - PPSV23) Never done   • MAMMOGRAM  04/18/2019   • RETINAL SCREENING  05/02/2019   • IMM INFLUENZA (1) Never done         5.  Reviewed/Updated the following with patient:       •   Preferred Pharmacy? No       •   Preferred Lab? No       •   Preferred Communication? No       •   Allergies? No       •   Medications? NO         6.  Care Team Updated:       •   DME Company (gait device, O2, CPAP, etc.): NO       •   Other Specialists (eye doctor, derm, GYN, cardiology, endo, etc): NO    7.  Patient was not advised: “This is a free wellness visit. The provider will screen for medical conditions to help you stay healthy. If you have other concerns to address you may be asked to discuss these at a separate visit or there may be an additional fee.”     8.  AHA (Puls8) form printed for Provider? N/A

## 2021-03-29 NOTE — TELEPHONE ENCOUNTER
Left message for patient to call back regarding pre-visit planning. Please transfer call to [phone]

## 2021-03-29 NOTE — PROGRESS NOTES
CC: Lucero Palm is a 75 y.o. female is suffering from   Chief Complaint   Patient presents with   • Establish Care         SUBJECTIVE:  1. Establishing care with new doctor, encounter for  Mica is here for establishment of care, has a history of diabetes, is doing well otherwise.    2. Diabetes mellitus type 2 in obese (HCC)  Patient with a history of type 2 diabetes with reasonably good control, will recheck labs    3. Dyslipidemia  History of dyslipidemia recheck lipid profile    4. Vitamin D deficiency  Check vitamin D    5. Fatigue, unspecified type  Etiology is uncertain        Past social, family, history:   Social History     Tobacco Use   • Smoking status: Never Smoker   • Smokeless tobacco: Never Used   Substance Use Topics   • Alcohol use: Yes     Alcohol/week: 0.6 oz     Types: 1 Glasses of wine per week     Comment: 2-3 per month   • Drug use: No         MEDICATIONS:    Current Outpatient Medications:   •  gabapentin (NEURONTIN) 300 MG Cap, Take 300 mg by mouth every day., Disp: , Rfl:   •  COMBIGAN 0.2-0.5 % Solution, , Disp: , Rfl:   •  Continuous Blood Gluc Sensor (KitaniSTYLE ANGEL 14 DAY SENSOR) [facility] [place], CHECK BLOOD SUGAR 5 TIMES DAILY. CHANGE SENSOR EVERY 14 DAYS DX CODE E11.65, Disp: , Rfl:   •  Dulaglutide (TRULICITY) 4.5 MG/0.5ML Solution Pen-injector, 4.5mg, Disp: , Rfl:   •  rosuvastatin (CRESTOR) 10 MG Tab, Take 10 mg by mouth every evening., Disp: , Rfl:   •  acetaminophen (TYLENOL) 325 MG Tab, Take 650 mg by mouth every four hours as needed., Disp: , Rfl:   •  Multiple Vitamins-Minerals (MULTIVITAMIN ADULT PO), Take  by mouth., Disp: , Rfl:   •  gabapentin (NEURONTIN) 300 MG Cap, , Disp: , Rfl:   •  TRULICITY 4.5 MG/0.5ML Solution Pen-injector, , Disp: , Rfl:   •  Dulaglutide (TRULICITY) 1.5 MG/0.5ML Solution Pen-injector, Inject 0.5 mL as instructed every 7 days., Disp: 12 PEN, Rfl: 4  •  Cyanocobalamin (VITAMIN B 12 PO), Take  by mouth., Disp: , Rfl:   •  BIOTIN PO, Take  by  mouth., Disp: , Rfl:   •  atorvastatin (LIPITOR) 20 MG Tab, Take 1 Tab by mouth every day., Disp: 90 Tab, Rfl: 3  •  Dapagliflozin-Metformin HCl ER (XIGDUO XR)  MG TABLET SR 24 HR, Take 1 Tab by mouth every morning., Disp: 90 Tab, Rfl: 2  •  Blood Glucose Monitoring Suppl (ACCU-CHEK JAMIE PLUS) w/Device Kit, 1 Kit by Does not apply route 4 times a day. (Patient not taking: Reported on 12/18/2018), Disp: 1 Kit, Rfl: 1  •  Lancet Devices Misc, 1 Applicator by Does not apply route 2 times a day, with meals., Disp: 50 Applicator, Rfl: 11  •  IBUPROFEN-FAMOTIDINE PO, Take  by mouth., Disp: , Rfl:     PROBLEMS:  Patient Active Problem List    Diagnosis Date Noted   • Sciatica of right side without back pain 05/31/2018   • Dyslipidemia 11/07/2017   • Diabetes mellitus type 2 in obese (HCC) 11/07/2017   • Gastroesophageal reflux disease without esophagitis 11/07/2017   • Obesity (BMI 30-39.9) 11/07/2017       REVIEW OF SYSTEMS:  Gen.:  No Nausea, Vomiting, fever, Chills.  Heart: No chest pain.  Lungs:  No shortness of Breath.  Psychological: Celso unusual Anxiety depression     PHYSICAL EXAM   Constitutional: Alert, cooperative, not in acute distress.  Cardiovascular:  Rate Rhythm is regular without murmurs rubs clicks.     Thorax & Lungs: Clear to auscultation, no wheezing, rhonchi, or rales  HENT: Normocephalic, Atraumatic.  Eyes: PERRLA, EOMI, Conjunctiva normal.   Neck: Trachia is midline no swelling of the thyroid.   Lymphatic: No lymphadenopathy noted.   Abdomin: Soft non-tender, no rebound, no guarding.   Skin: Warm, Dry, No erythema, No rash.   Extremities: Atraumatic with symmetric distal pulses, No edema, No tenderness, No cyanosis, No clubbing.   Musculoskeletal: No significant joint pathology bilateral hands  Neurologic: Alert & oriented x 3, cranial nerves II through XII are intact, Normal motor function, Normal sensory function, No focal deficits noted.   Psychiatric: Affect normal, Judgment normal,  "Mood normal.     VITAL SIGNS:/70   Pulse 83   Temp 36.1 °C (96.9 °F) (Temporal)   Resp 14   Ht 1.778 m (5' 10\")   Wt 107 kg (235 lb)   SpO2 97%   BMI 33.72 kg/m²     Labs: Reviewed    Assessment:                                                     Plan:    1. Establishing care with new doctor, encounter for  Recheck lipid profile  - Lipid Profile; Future    2. Diabetes mellitus type 2 in obese (HCC)  Labs ordered  - Comp Metabolic Panel; Future  - CBC WITH DIFFERENTIAL; Future  - Lipid Profile; Future    3. Dyslipidemia  As detailed above  - Comp Metabolic Panel; Future  - CBC WITH DIFFERENTIAL; Future  - Lipid Profile; Future    4. Vitamin D deficiency  Recheck vitamin D  - Comp Metabolic Panel; Future  - CBC WITH DIFFERENTIAL; Future  - VITAMIN D,25 HYDROXY; Future  - Lipid Profile; Future    5. Fatigue, unspecified type  Check free T4 TSH sedimentation rate  - FREE THYROXINE; Future  - TSH; Future  - Sed Rate; Future  - TREASURE REFLEXIVE PROFILE; Future        "

## 2021-04-28 ENCOUNTER — OFFICE VISIT (OUTPATIENT)
Dept: MEDICAL GROUP | Facility: LAB | Age: 76
End: 2021-04-28
Payer: MEDICARE

## 2021-04-28 VITALS
WEIGHT: 237 LBS | OXYGEN SATURATION: 95 % | SYSTOLIC BLOOD PRESSURE: 120 MMHG | DIASTOLIC BLOOD PRESSURE: 58 MMHG | HEIGHT: 70 IN | BODY MASS INDEX: 33.93 KG/M2 | HEART RATE: 83 BPM | RESPIRATION RATE: 14 BRPM | TEMPERATURE: 97.1 F

## 2021-04-28 DIAGNOSIS — E66.9 DIABETES MELLITUS TYPE 2 IN OBESE: ICD-10-CM

## 2021-04-28 DIAGNOSIS — R07.9 CHEST PAIN, UNSPECIFIED TYPE: ICD-10-CM

## 2021-04-28 DIAGNOSIS — E11.69 DIABETES MELLITUS TYPE 2 IN OBESE: ICD-10-CM

## 2021-04-28 DIAGNOSIS — R05.9 COUGH: ICD-10-CM

## 2021-04-28 DIAGNOSIS — E78.5 DYSLIPIDEMIA: ICD-10-CM

## 2021-04-28 DIAGNOSIS — Z12.31 ENCOUNTER FOR SCREENING MAMMOGRAM FOR MALIGNANT NEOPLASM OF BREAST: ICD-10-CM

## 2021-04-28 DIAGNOSIS — E55.9 VITAMIN D DEFICIENCY: ICD-10-CM

## 2021-04-28 PROCEDURE — 99214 OFFICE O/P EST MOD 30 MIN: CPT | Mod: 25 | Performed by: INTERNAL MEDICINE

## 2021-04-28 PROCEDURE — 8041 PR SCP AHA: Performed by: INTERNAL MEDICINE

## 2021-04-28 RX ORDER — FEXOFENADINE HCL 180 MG/1
180 TABLET ORAL DAILY
COMMUNITY
End: 2021-11-17

## 2021-04-28 RX ORDER — VITAMIN E 268 MG
400 CAPSULE ORAL DAILY
COMMUNITY
End: 2021-05-18

## 2021-04-28 RX ORDER — CHLORAL HYDRATE 500 MG
1000 CAPSULE ORAL
COMMUNITY
End: 2021-05-18

## 2021-04-28 RX ORDER — CHOLECALCIFEROL (VITAMIN D3) 125 MCG
CAPSULE ORAL
COMMUNITY
End: 2021-05-18

## 2021-04-28 ASSESSMENT — ENCOUNTER SYMPTOMS: GENERAL WELL-BEING: GOOD

## 2021-04-28 ASSESSMENT — PATIENT HEALTH QUESTIONNAIRE - PHQ9: CLINICAL INTERPRETATION OF PHQ2 SCORE: 0

## 2021-04-28 ASSESSMENT — FIBROSIS 4 INDEX: FIB4 SCORE: 1.55

## 2021-04-28 ASSESSMENT — ACTIVITIES OF DAILY LIVING (ADL): BATHING_REQUIRES_ASSISTANCE: 0

## 2021-04-28 NOTE — PROGRESS NOTES
Chief Complaint   Patient presents with   • Annual Exam         HPI:  Lucero is a 75 y.o. here for Medicare Annual Wellness Visit    Patient and I have discussed that she is doing reasonably well, has had problems with chest pain which on physical examination was found to be a possible breast nodule.    Patient Active Problem List    Diagnosis Date Noted   • Sciatica of right side without back pain 05/31/2018   • Dyslipidemia 11/07/2017   • Diabetes mellitus type 2 in obese (HCC) 11/07/2017   • Gastroesophageal reflux disease without esophagitis 11/07/2017   • Obesity (BMI 30-39.9) 11/07/2017       Current Outpatient Medications   Medication Sig Dispense Refill   • THIAMINE HCL PO Take  by mouth.     • GARLIC OIL PO Take  by mouth.     • Cholecalciferol (VITAMIN D) 125 MCG (5000 UT) Cap Take  by mouth.     • VITAMIN D PO Take 3,000 Units by mouth.     • vitamin e (VITAMIN E) 400 UNIT Cap Take 400 Units by mouth every day.     • Omega-3 Fatty Acids (FISH OIL) 1000 MG Cap capsule Take 1,000 mg by mouth 3 times a day with meals.     • fexofenadine (ALLEGRA ALLERGY) 180 MG tablet Take 180 mg by mouth every day.     • gabapentin (NEURONTIN) 300 MG Cap Take 300 mg by mouth every day.     • COMBIGAN 0.2-0.5 % Solution      • Dulaglutide (TRULICITY) 4.5 MG/0.5ML Solution Pen-injector 4.5mg     • rosuvastatin (CRESTOR) 10 MG Tab Take 10 mg by mouth every evening.     • acetaminophen (TYLENOL) 325 MG Tab Take 650 mg by mouth every four hours as needed.     • Multiple Vitamins-Minerals (MULTIVITAMIN ADULT PO) Take  by mouth.     • Continuous Blood Gluc Sensor (FREESTYLE ANGEL 14 DAY SENSOR) [facility] [place] CHECK BLOOD SUGAR 5 TIMES DAILY. CHANGE SENSOR EVERY 14 DAYS DX CODE E11.65       No current facility-administered medications for this visit.        Patient is taking medications as noted in medication list.  Current supplements as per medication list.     Allergies: Xigduo xr [dapagliflozin-metformin hcl er]    Current social  contact/activities: travel, going out to eat, walking, grandkids      Is patient current with immunizations? Yes.    She  reports that she has never smoked. She has never used smokeless tobacco. She reports current alcohol use of about 0.6 oz of alcohol per week. She reports that she does not use drugs.  Counseling given: Not Answered        DPA/Advanced directive: Patient has Advanced Directive, but it is not on file. Instructed to bring in a copy to scan into their chart.    ROS:    Gait: Uses no assistive device    Ostomy: No    Other tubes: No    Amputations: No    Chronic oxygen use No    Last eye exam 2021    Wears hearing aids: No    : Denies any urinary leakage during the last 6 months       Screening:         Depression Screening    Little interest or pleasure in doing things?  0 - not at all  Feeling down, depressed, or hopeless? 0 - not at all  Patient Health Questionnaire Score: 0    If depressive symptoms identified deferred to follow up visit unless specifically addressed in assessment and plan.    Interpretation of PHQ-9 Total Score   Score Severity   1-4 No Depression   5-9 Mild Depression   10-14 Moderate Depression   15-19 Moderately Severe Depression   20-27 Severe Depression    Screening for Cognitive Impairment    Three Minute Recall (captain, garden, picture)  3/3    Brando clock face with all 12 numbers and set the hands to show 5 past 8.  Yes    If cognitive concerns identified, deferred for follow up unless specifically addressed in assessment and plan.    Fall Risk Assessment    Has the patient had two or more falls in the last year or any fall with injury in the last year?  Yes  If fall risk identified, deferred for follow up unless specifically addressed in assessment and plan.    Safety Assessment    Throw rugs on floor.  No  Handrails on all stairs.  No  Good lighting in all hallways.  Yes  Difficulty hearing.  No  Patient counseled about all safety risks that were  identified.    Functional Assessment ADLs    Are there any barriers preventing you from cooking for yourself or meeting nutritional needs?  No.    Are there any barriers preventing you from driving safely or obtaining transportation?  No.    Are there any barriers preventing you from using a telephone or calling for help?  No.    Are there any barriers preventing you from shopping?  No.    Are there any barriers preventing you from taking care of your own finances?  No.    Are there any barriers preventing you from managing your medications?  No.    Are there any barriers preventing you from showering, bathing or dressing yourself?  No.    Are you currently engaging in any exercise or physical activity?  Yes.     What is your perception of your health?  Good.    Health Maintenance Summary                Annual Wellness Visit Overdue 1945     DIABETES MONOFILAMENT / LE EXAM Overdue 2[birth year]19/1946     COLONOSCOPY Overdue 8/19/1993     IMM ZOSTER VACCINES Overdue 8/19/1993     IMM PNEUMOCOCCAL VACCINE: 65+ Years Overdue 8/19/2008     MAMMOGRAM Overdue 4/18/2019      Done 4/18/2018 MA-MAMMO SCREENING BILAT W/TOMOSYNTHESIS W/CAD    RETINAL SCREENING Overdue 5/2/2019      Done 5/2/2018 REFERRAL FOR RETINAL SCREENING EXAM    A1C SCREENING Overdue 12/9/2020      Done 6/9/2020 HEMOGLOBIN A1C     Patient has more history with this topic...    FASTING LIPID PROFILE Next Due 8/25/2021      Done 8/25/2020 LIPID PROFILE     Patient has more history with this topic...    URINE ACR / MICROALBUMIN Next Due 8/25/2021      Done 8/25/2020 MICROALBUMIN CREAT RATIO URINE     Patient has more history with this topic...    SERUM CREATININE Next Due 8/25/2021      Done 8/25/2020 COMP METABOLIC PANEL     Patient has more history with this topic...    IMM INFLUENZA Next Due 9/1/2021     BONE DENSITY Next Due 5/29/2023      Done 5/29/2018 DS-BONE DENSITY STUDY (DEXA)    IMM DTaP/Tdap/Td Vaccine Next Due 1/12/2026      Done 1/12/2016 Imm Admin:  "Tdap Vaccine          Patient Care Team:  Jp Crocker D.O. as PCP - General (Internal Medicine)  Catalina Medeiros M.D. (OB/Gyn)  Hiro Sanchez P.A.-C. as Consulting Physician (Endocrinology)    Social History     Tobacco Use   • Smoking status: Never Smoker   • Smokeless tobacco: Never Used   Substance Use Topics   • Alcohol use: Yes     Alcohol/week: 0.6 oz     Types: 1 Glasses of wine per week     Comment: 2-3 per month   • Drug use: No     Family History   Problem Relation Age of Onset   • Breast Cancer Mother 42   • Breast Cancer Sister    • Heart Disease Neg Hx      She  has a past medical history of Arthritis, Cataract, Diabetes (HCC), GERD (gastroesophageal reflux disease), High cholesterol, and Pain.   Past Surgical History:   Procedure Laterality Date   • LITHOTRIPSY  2015   • OTHER  2011    D&C   • INGUINAL HERNIA REPAIR Left 2009   • OTHER  1972    back surgery           Exam:     /58   Pulse 83   Temp 36.2 °C (97.1 °F) (Temporal)   Resp 14   Ht 1.778 m (5' 10\")   Wt 108 kg (237 lb)   SpO2 95%  Body mass index is 34.01 kg/m².    Hearing good.    Dentition not asked  Alert, oriented in no acute distress.  Eye contact is good, speech goal directed, affect calm       Assessment and Plan. The following treatment and monitoring plan is recommended:     1. Chest pain, unspecified type  Diagnostic mammogram right breast ordered  - MA DIAGNOSTIC MAMMO RIGHT W/CAD; Future  - Comp Metabolic Panel; Future  - CBC WITH DIFFERENTIAL; Future  - HEMOGLOBIN A1C; Future    2. Cough  No change in medical therapy  - Comp Metabolic Panel; Future  - CBC WITH DIFFERENTIAL; Future  - HEMOGLOBIN A1C; Future    3. Vitamin D deficiency  Recheck vitamin D  - VITAMIN D,25 HYDROXY; Future  - Comp Metabolic Panel; Future  - CBC WITH DIFFERENTIAL; Future  - HEMOGLOBIN A1C; Future    4. Encounter for screening mammogram for malignant neoplasm of breast  Screening mammogram ordered  - MA-SCREENING MAMMO BILAT W/CAD; " Future  - Comp Metabolic Panel; Future  - CBC WITH DIFFERENTIAL; Future  - HEMOGLOBIN A1C; Future    5. Dyslipidemia  Recheck lipid profile  - Comp Metabolic Panel; Future  - CBC WITH DIFFERENTIAL; Future  - Lipid Profile; Future  - HEMOGLOBIN A1C; Future    6. Diabetes mellitus type 2 in obese (HCC)  Check hemoglobin A1c  - HEMOGLOBIN A1C; Future      Services suggested: No services needed at this time  Health Care Screening recommendations as per orders if indicated.  Referrals offered: PT/OT/Nutrition counseling/Behavioral Health/Smoking cessation as per orders if indicated.    Discussion today about general wellness and lifestyle habits:    · Prevent falls and reduce trip hazards; Cautioned about securing or removing rugs.  · Have a working fire alarm and carbon monoxide detector;   · Engage in regular physical activity and social activities       Follow-up: No follow-ups on file.

## 2021-04-29 ENCOUNTER — HOSPITAL ENCOUNTER (OUTPATIENT)
Dept: LAB | Facility: MEDICAL CENTER | Age: 76
End: 2021-04-29
Attending: PHYSICIAN ASSISTANT
Payer: MEDICARE

## 2021-04-29 ENCOUNTER — HOSPITAL ENCOUNTER (OUTPATIENT)
Dept: LAB | Facility: MEDICAL CENTER | Age: 76
End: 2021-04-29
Attending: INTERNAL MEDICINE
Payer: MEDICARE

## 2021-04-29 DIAGNOSIS — E11.69 DIABETES MELLITUS TYPE 2 IN OBESE: ICD-10-CM

## 2021-04-29 DIAGNOSIS — R07.9 CHEST PAIN, UNSPECIFIED TYPE: ICD-10-CM

## 2021-04-29 DIAGNOSIS — E78.5 DYSLIPIDEMIA: ICD-10-CM

## 2021-04-29 DIAGNOSIS — R05.9 COUGH: ICD-10-CM

## 2021-04-29 DIAGNOSIS — Z12.31 ENCOUNTER FOR SCREENING MAMMOGRAM FOR MALIGNANT NEOPLASM OF BREAST: ICD-10-CM

## 2021-04-29 DIAGNOSIS — E55.9 VITAMIN D DEFICIENCY: ICD-10-CM

## 2021-04-29 DIAGNOSIS — E66.9 DIABETES MELLITUS TYPE 2 IN OBESE: ICD-10-CM

## 2021-04-29 PROCEDURE — 80053 COMPREHEN METABOLIC PANEL: CPT

## 2021-04-29 PROCEDURE — 80053 COMPREHEN METABOLIC PANEL: CPT | Mod: 91

## 2021-04-29 PROCEDURE — 82570 ASSAY OF URINE CREATININE: CPT

## 2021-04-29 PROCEDURE — 80061 LIPID PANEL: CPT

## 2021-04-29 PROCEDURE — 82306 VITAMIN D 25 HYDROXY: CPT

## 2021-04-29 PROCEDURE — 85025 COMPLETE CBC W/AUTO DIFF WBC: CPT

## 2021-04-29 PROCEDURE — 36415 COLL VENOUS BLD VENIPUNCTURE: CPT

## 2021-04-29 PROCEDURE — 82043 UR ALBUMIN QUANTITATIVE: CPT

## 2021-04-29 PROCEDURE — 84439 ASSAY OF FREE THYROXINE: CPT

## 2021-04-29 PROCEDURE — 84443 ASSAY THYROID STIM HORMONE: CPT

## 2021-04-29 PROCEDURE — 80061 LIPID PANEL: CPT | Mod: 91

## 2021-04-29 PROCEDURE — 83036 HEMOGLOBIN GLYCOSYLATED A1C: CPT | Mod: GA

## 2021-04-30 LAB
ALBUMIN SERPL BCP-MCNC: 4 G/DL (ref 3.2–4.9)
ALBUMIN SERPL BCP-MCNC: 4.1 G/DL (ref 3.2–4.9)
ALBUMIN/GLOB SERPL: 1.3 G/DL
ALBUMIN/GLOB SERPL: 1.4 G/DL
ALP SERPL-CCNC: 69 U/L (ref 30–99)
ALP SERPL-CCNC: 70 U/L (ref 30–99)
ALT SERPL-CCNC: 26 U/L (ref 2–50)
ALT SERPL-CCNC: 27 U/L (ref 2–50)
ANION GAP SERPL CALC-SCNC: 10 MMOL/L (ref 7–16)
ANION GAP SERPL CALC-SCNC: 8 MMOL/L (ref 7–16)
AST SERPL-CCNC: 20 U/L (ref 12–45)
AST SERPL-CCNC: 22 U/L (ref 12–45)
BASOPHILS # BLD AUTO: 0.6 % (ref 0–1.8)
BASOPHILS # BLD: 0.05 K/UL (ref 0–0.12)
BILIRUB SERPL-MCNC: 0.5 MG/DL (ref 0.1–1.5)
BILIRUB SERPL-MCNC: 0.5 MG/DL (ref 0.1–1.5)
BUN SERPL-MCNC: 12 MG/DL (ref 8–22)
BUN SERPL-MCNC: 12 MG/DL (ref 8–22)
CALCIUM SERPL-MCNC: 9.4 MG/DL (ref 8.5–10.5)
CALCIUM SERPL-MCNC: 9.4 MG/DL (ref 8.5–10.5)
CHLORIDE SERPL-SCNC: 102 MMOL/L (ref 96–112)
CHLORIDE SERPL-SCNC: 103 MMOL/L (ref 96–112)
CHOLEST SERPL-MCNC: 142 MG/DL (ref 100–199)
CHOLEST SERPL-MCNC: 143 MG/DL (ref 100–199)
CO2 SERPL-SCNC: 26 MMOL/L (ref 20–33)
CO2 SERPL-SCNC: 27 MMOL/L (ref 20–33)
CREAT SERPL-MCNC: 0.61 MG/DL (ref 0.5–1.4)
CREAT SERPL-MCNC: 0.68 MG/DL (ref 0.5–1.4)
CREAT UR-MCNC: 89.47 MG/DL
EOSINOPHIL # BLD AUTO: 0.11 K/UL (ref 0–0.51)
EOSINOPHIL NFR BLD: 1.3 % (ref 0–6.9)
ERYTHROCYTE [DISTWIDTH] IN BLOOD BY AUTOMATED COUNT: 45.8 FL (ref 35.9–50)
EST. AVERAGE GLUCOSE BLD GHB EST-MCNC: 148 MG/DL
FASTING STATUS PATIENT QL REPORTED: NORMAL
FASTING STATUS PATIENT QL REPORTED: NORMAL
GLOBULIN SER CALC-MCNC: 3 G/DL (ref 1.9–3.5)
GLOBULIN SER CALC-MCNC: 3.2 G/DL (ref 1.9–3.5)
GLUCOSE SERPL-MCNC: 117 MG/DL (ref 65–99)
GLUCOSE SERPL-MCNC: 121 MG/DL (ref 65–99)
HBA1C MFR BLD: 6.8 % (ref 4–5.6)
HCT VFR BLD AUTO: 50 % (ref 37–47)
HDLC SERPL-MCNC: 42 MG/DL
HDLC SERPL-MCNC: 43 MG/DL
HGB BLD-MCNC: 15.9 G/DL (ref 12–16)
IMM GRANULOCYTES # BLD AUTO: 0.02 K/UL (ref 0–0.11)
IMM GRANULOCYTES NFR BLD AUTO: 0.2 % (ref 0–0.9)
LDLC SERPL CALC-MCNC: 67 MG/DL
LDLC SERPL CALC-MCNC: 70 MG/DL
LYMPHOCYTES # BLD AUTO: 3.2 K/UL (ref 1–4.8)
LYMPHOCYTES NFR BLD: 37 % (ref 22–41)
MCH RBC QN AUTO: 28.4 PG (ref 27–33)
MCHC RBC AUTO-ENTMCNC: 31.8 G/DL (ref 33.6–35)
MCV RBC AUTO: 89.3 FL (ref 81.4–97.8)
MICROALBUMIN UR-MCNC: <1.2 MG/DL
MICROALBUMIN/CREAT UR: NORMAL MG/G (ref 0–30)
MONOCYTES # BLD AUTO: 0.74 K/UL (ref 0–0.85)
MONOCYTES NFR BLD AUTO: 8.6 % (ref 0–13.4)
NEUTROPHILS # BLD AUTO: 4.53 K/UL (ref 2–7.15)
NEUTROPHILS NFR BLD: 52.3 % (ref 44–72)
NRBC # BLD AUTO: 0 K/UL
NRBC BLD-RTO: 0 /100 WBC
PLATELET # BLD AUTO: 193 K/UL (ref 164–446)
PMV BLD AUTO: 10 FL (ref 9–12.9)
POTASSIUM SERPL-SCNC: 4.3 MMOL/L (ref 3.6–5.5)
POTASSIUM SERPL-SCNC: 4.4 MMOL/L (ref 3.6–5.5)
PROT SERPL-MCNC: 7.1 G/DL (ref 6–8.2)
PROT SERPL-MCNC: 7.2 G/DL (ref 6–8.2)
RBC # BLD AUTO: 5.6 M/UL (ref 4.2–5.4)
SODIUM SERPL-SCNC: 138 MMOL/L (ref 135–145)
SODIUM SERPL-SCNC: 138 MMOL/L (ref 135–145)
T4 FREE SERPL-MCNC: 1.11 NG/DL (ref 0.93–1.7)
TRIGL SERPL-MCNC: 155 MG/DL (ref 0–149)
TRIGL SERPL-MCNC: 158 MG/DL (ref 0–149)
TSH SERPL DL<=0.005 MIU/L-ACNC: 1.3 UIU/ML (ref 0.38–5.33)
WBC # BLD AUTO: 8.7 K/UL (ref 4.8–10.8)

## 2021-05-01 LAB — 25(OH)D3 SERPL-MCNC: 29 NG/ML (ref 30–80)

## 2021-05-05 ENCOUNTER — HOSPITAL ENCOUNTER (OUTPATIENT)
Dept: RADIOLOGY | Facility: MEDICAL CENTER | Age: 76
End: 2021-05-05
Payer: MEDICARE

## 2021-05-06 ENCOUNTER — HOSPITAL ENCOUNTER (OUTPATIENT)
Dept: RADIOLOGY | Facility: MEDICAL CENTER | Age: 76
End: 2021-05-06
Attending: INTERNAL MEDICINE
Payer: MEDICARE

## 2021-05-06 DIAGNOSIS — N64.4 BREAST TENDERNESS: ICD-10-CM

## 2021-05-06 DIAGNOSIS — N64.4 BREAST PAIN: ICD-10-CM

## 2021-05-06 DIAGNOSIS — Z12.31 ENCOUNTER FOR SCREENING MAMMOGRAM FOR MALIGNANT NEOPLASM OF BREAST: ICD-10-CM

## 2021-05-06 PROCEDURE — G0279 TOMOSYNTHESIS, MAMMO: HCPCS

## 2021-05-06 PROCEDURE — 76642 ULTRASOUND BREAST LIMITED: CPT | Mod: RT

## 2021-05-17 ENCOUNTER — TELEPHONE (OUTPATIENT)
Dept: CARDIOLOGY | Facility: CLINIC | Age: 76
End: 2021-05-17

## 2021-05-17 NOTE — PROGRESS NOTES
Subjective:   Chief Complaint:   Chief Complaint   Patient presents with   • Abnormal EKG       Lucero aPlm is a 75 y.o. female who is referred by Dr. Crocker for abnormal ECG, coronary artery calcification, fatigue, left LE edema.    Has cough in the morning.  Some neuropathy pain, has to take gabapentin.  Mild LE edema.    Noting fatigue, lack of energy.    Has HLP, on statin, , now 68, TG up also.    Has DM, oral meds only.    She is not limited by chest pain, pressure or tightness with activity.   No significant dyspnea on exertion, orthopnea.  No significant palpitations, lightheadedness, or presyncope/syncope.   No symptoms of leg claudication.   No stroke/TIA like symptoms.    No prior hypertension.    No family history of premature coronary artery disease.  Mother  at 42 of breast CA, she was 19 yo with a 6 month old, then had to raise her siblings.  No prior smoking history.    No history of autoimmune disease such as lupus or rheumatoid arthritis.  No chronic kidney disease.  No ETOH overuse.  No caffeine overuse.  No recreation substance use.  No hx asthma.      Remotely saw Dr. Araiza.  Had echo, CAC.      [place].  I have seen her , Mich Palm, I saw him in the hospital.  Her daughter is Clementina Palm, works in geriatrics.    DATA REVIEWED by me:  ECG (my personal interpretation) 21  Sinus, 82    Echo   No prior study is available for comparison.   The left ventricle was normal in size.  Mild concentric left ventricular hypertrophy.  Left ventricular ejection fraction is visually estimated to be 65%.  Moderately dilated right ventricle.  Enlarged right atrium.  Trace aortic insufficiency.  Mild tricuspid regurgitation.  Mild pulmonic insufficiency.  Normal pericardium without effusion.    CAC   Coronary calcification:  LMA - 0.0  LCX - 0.0  LAD - 88.8  RCA - 57.8  PDA - 0.0     Calcium score:  146.6     Mild degree of bronchiectasis is  noted in the right lower pulmonary lobe.       Most recent labs:       Lab Results   Component Value Date/Time    WBC 8.7 2021 11:17 AM    HEMOGLOBIN 15.9 2021 11:17 AM    HEMATOCRIT 50.0 (H) 2021 11:17 AM    MCV 89.3 2021 11:17 AM      Lab Results   Component Value Date/Time    SODIUM 138 2021 11:18 AM    POTASSIUM 4.3 2021 11:18 AM    CHLORIDE 102 2021 11:18 AM    CO2 26 2021 11:18 AM    GLUCOSE 117 (H) 2021 11:18 AM    BUN 12 2021 11:18 AM    CREATININE 0.61 2021 11:18 AM      Lab Results   Component Value Date/Time    ASTSGOT 20 2021 11:18 AM    ALTSGPT 26 2021 11:18 AM    ALBUMIN 4.1 2021 11:18 AM      Lab Results   Component Value Date/Time    CHOLSTRLTOT 143 2021 11:18 AM    LDL 70 2021 11:18 AM    HDL 42 2021 11:18 AM    TRIGLYCERIDE 155 (H) 2021 11:18 AM     No results for input(s): NTPROBNP, TROPONINT in the last 72 hours.      Past Medical History:   Diagnosis Date   • Arthritis     foot, left thumb   • Cataract     kimberley IOL   • Diabetes (HCC)     oral medication   • GERD (gastroesophageal reflux disease)    • High cholesterol    • Pain     right foot     Past Surgical History:   Procedure Laterality Date   • LITHOTRIPSY     • OTHER      D&C   • INGUINAL HERNIA REPAIR Left    • OTHER  1972    back surgery     Family History   Problem Relation Age of Onset   • Breast Cancer Mother 42   • Cancer Mother          at 42 of breast CA   • Breast Cancer Sister    • Heart Disease Neg Hx      Social History     Socioeconomic History   • Marital status:      Spouse name: Not on file   • Number of children: Not on file   • Years of education: Not on file   • Highest education level: Not on file   Occupational History   • Not on file   Tobacco Use   • Smoking status: Never Smoker   • Smokeless tobacco: Never Used   Substance and Sexual Activity   • Alcohol use: Yes     Alcohol/week: 0.6 oz      Types: 1 Glasses of wine per week     Comment: 2-3 per month   • Drug use: No   • Sexual activity: Never   Other Topics Concern   • Not on file   Social History Narrative   • Not on file     Social Determinants of Health     Financial Resource Strain:    • Difficulty of Paying Living Expenses:    Food Insecurity:    • Worried About Running Out of Food in the Last Year:    • Ran Out of Food in the Last Year:    Transportation Needs:    • Lack of Transportation (Medical):    • Lack of Transportation (Non-Medical):    Physical Activity:    • Days of Exercise per Week:    • Minutes of Exercise per Session:    Stress:    • Feeling of Stress :    Social Connections:    • Frequency of Communication with Friends and Family:    • Frequency of Social Gatherings with Friends and Family:    • Attends [employer] Services:    • Active Member of Clubs or Organizations:    • Attends Club or Organization Meetings:    • Marital Status:    Intimate Partner Violence:    • Fear of Current or Ex-Partner:    • Emotionally Abused:    • Physically Abused:    • Sexually Abused:      Allergies   Allergen Reactions   • Empagliflozin Unspecified   • Linagliptin Unspecified   • Metformin Hcl Unspecified   • Pioglitazone Unspecified   • Xigduo Xr [Dapagliflozin-Metformin Hcl Er]        Current Outpatient Medications   Medication Sig Dispense Refill   • THIAMINE HCL PO Take  by mouth.     • fexofenadine (ALLEGRA ALLERGY) 180 MG tablet Take 180 mg by mouth every day.     • gabapentin (NEURONTIN) 300 MG Cap Take 300 mg by mouth every day.     • COMBIGAN 0.2-0.5 % Solution      • Continuous Blood Gluc Sensor (hipages GroupSTYLE ANGEL 14 DAY SENSOR) [facility] CHECK BLOOD SUGAR 5 TIMES DAILY. CHANGE SENSOR EVERY 14 DAYS DX CODE E11.65     • Dulaglutide (TRULICITY) 4.5 MG/0.5ML Solution Pen-injector 4.5mg     • rosuvastatin (CRESTOR) 10 MG Tab Take 10 mg by mouth every evening.     • acetaminophen (TYLENOL) 325 MG Tab Take 650 mg by mouth every four hours as  "needed.       No current facility-administered medications for this visit.       ROS  All others systems reviewed and negative.     Objective:     /70 (BP Location: Left arm, Patient Position: Sitting, BP Cuff Size: Adult)   Pulse 82   Ht 1.778 m (5' 10\")   Wt 110 kg (242 lb)   SpO2 96%  Body mass index is 34.72 kg/m².    General: No acute distress. Well nourished.  HEENT: EOM grossly intact, no scleral icterus, no pharyngeal erythema.   Neck:  No JVD, no bruits, trachea midline  CVS: RRR. Normal S1, S2. No M/R/G. Trace left LE edema.  2+ radial pulses, 2+ PT pulses  Resp: CTAB. No wheezing or crackles/rhonchi. Normal respiratory effort.  Abdomen: Soft, NT, no jayson hepatomegaly, obese.  MSK/Ext: No clubbing or cyanosis.  Skin: Warm and dry, no rashes.  Neurological: CN III-XII grossly intact. No focal deficits.   Psych: A&O x 3, appropriate affect, good judgement        Assessment:     1. Coronary artery calcification seen on CT scan  EKG   2. Diabetes mellitus type 2 in obese (HCC)  EKG   3. Mixed hyperlipidemia     4. Other fatigue  EC-ECHOCARDIOGRAM COMPLETE W/O CONT   5. BMI 34.0-34.9,adult     6. Snores  EC-ECHOCARDIOGRAM COMPLETE W/O CONT   7. Chronic venous insufficiency     8. Lower extremity edema  EC-ECHOCARDIOGRAM COMPLETE W/O CONT       Medical Decision Making:  Today's Assessment / Status / Plan:     -Has CAC, on statin  -Control DM  -BP ok, not on lisinopril at this point  -no need for ASA  -Fatigue not clear, TSH is normal, labs normal  -Snores, fatigue, not particularly sleepy, consider sleep eval  -We discussed lifestyle, exercise  -Echo for LE edema but I suspect venous insuff  -RTC 3 months      Written instructions given today:      -Echocardiogram- heart pictures to look at the heart structure and pump function. This will exclude a heart cause of leg swelling.    -Leg swelling is often related to the vein incompetence and not harmful, just cosmetic.     Coronary artery " "calcification:    A CT scan is a still image of the body but the heart is always moving so everything seen around the heart is blurry.  \"Coronary artery calcification\" is a way of describing that you have calcified disease in the blood vessels to the heart. The CT scan cannot tell if the calcification is on the outside or inside, it simply tells whether or not it is present.  A Calcium Score CT scan gives an objective measurement to the amount of calcification. Most people over 70 have some degree of calcified heart disease so I typically do not order calcium scores and I am not alarmed by calcified heart disease on a regular CT scan.     Blockages in the heart artery should only have a stent or bypass if they are more than 70% blocked (at which point most people have symptoms such as chest pain or unusual shortness of breath with activity that goes away with rest).   People have a sudden heart attack should probably have a stent placed in the heart.   People have chest pain that limits their ability to function could consider having a stent placed in the heart.  Otherwise, medication and lifestyle changes are the preferred treatment. Stents placed outside of severe symptoms or sudden heart attacks do not reduce mortality (likelihood of dying from a heart attack) and often people end up need a stent within a stent.  Lifestyle and medications are more important than stents or bypass under most circumstances.    The only things to do for calcified heart disease to lower your risk of sudden heart attack (or stroke which is the same disease but a different location-the brain):    -Take a statin medication (as a vascular medication, not as a cholesterol medication) which is our most powerful weapon to stabilize the plaques making them less likely to rupture open and cause a sudden heart attack. You are already doing this.  -Keep LDL cholesterol under 100, you are there at 70.  -Control blood pressure, under 130/80, " "ideally closer to 120/80, you are there.  -Control blood sugar, you are there, your A1C is 6.8.  -Don't smoke, this is good.  -Eat a heart healthy diet that reduces inflammation: Pritikin, Mediterranean, Vegetarian, Vegan  -Get regular exercise: 150 minutes of moderate exercise OR 75 minutes of very vigorous exercise every week. This should get easier over time. If you cannot make progress then we may need to take a closer look at your fatigue but so far your testing is normal.  -Know the signs and symptoms of heart attack and stroke and when to call 9-1-1.    Resources to learn more:  American Heart Association   Www.Cardiosmart.org, sponsored by the American College of cardiology.    Signs of a stroke: sudden inability to talk, smile on one side, confusion, inability to move arm/leg on one side, numbness/tingling of arm/leg on one side that is not typical. \"Think FAST.\"  F=face drooping  A=arm weakness  S=Speech difficulty  T= time to call 911 (do not give ride to someone, call ambulance to alert the hospital to start stroke protocol)    Signs of a heart attack: Anything very intense coming from the chest that lasts more than 15 minutes, consider calling 911.  -Pain or pressure in the chest that is intense, lasts more than 15 minutes, not explained by other known reasons such as known/similar heart burn  -It can feel like severe heart burn but associated with nausea, vomiting  -It can be vague pain that radiates to the neck, jaw, shoulders, arm/arms, wrap around your back or even cause a toothache  -Can be associated with unusual shortness of breath at rest or sense of impending doom      -Knee high compression socks, based on shoe size and amount of compression.  I recommend larger size to avoid toe compression.  Start with 15-20 mmHg of pressure, if too much, can go down to 8-15 mmHg.    There are also 20-30 mmHg but these tend to be very tight.  Can buy online.    -One possible reversible cause of daytime " fatigue can be sleep apnea. Consider testing, Dr. Crocker can address this.    -When I get the echo results I will send a [employer] message. You should always hear results of testing within 5 days with my interpretation, if you do not, send a [employer] message or call the office: [phone].    -I will see you back in 3 months, see how you how are progressing after starting regular exercise.      Return in about 3 months (around 8/18/2021).    It is my pleasure to participate in the care of Ms. Palm.  Please do not hesitate to contact me with questions or concerns.    Maribel Byers MD, [facility]  Cardiologist [facility]    Please note that this dictation was created using voice recognition software. I have made every reasonable attempt to correct obvious errors, but it is possible there are errors of grammar and possibly content that I did not discover before finalizing the note.

## 2021-05-17 NOTE — TELEPHONE ENCOUNTER
Called patient in regards to her NP appt with  on 05/18/2021 at 10:20am. Pt stated she has not seen a previous Cardiologist in the past other than [facility], imaging/blood work is up to date and within chart.Let her know she will have an EKG at her appointment, and to not wear any lotion or the electrodes won't stick. Confirmed appt time and location, pt verbally understood.

## 2021-05-18 ENCOUNTER — OFFICE VISIT (OUTPATIENT)
Dept: CARDIOLOGY | Facility: CLINIC | Age: 76
End: 2021-05-18
Payer: MEDICARE

## 2021-05-18 VITALS
BODY MASS INDEX: 34.65 KG/M2 | DIASTOLIC BLOOD PRESSURE: 70 MMHG | HEIGHT: 70 IN | OXYGEN SATURATION: 96 % | WEIGHT: 242 LBS | HEART RATE: 82 BPM | SYSTOLIC BLOOD PRESSURE: 124 MMHG

## 2021-05-18 DIAGNOSIS — R53.83 OTHER FATIGUE: ICD-10-CM

## 2021-05-18 DIAGNOSIS — E78.2 MIXED HYPERLIPIDEMIA: ICD-10-CM

## 2021-05-18 DIAGNOSIS — I25.10 CORONARY ARTERY CALCIFICATION SEEN ON CT SCAN: ICD-10-CM

## 2021-05-18 DIAGNOSIS — E11.69 DIABETES MELLITUS TYPE 2 IN OBESE: ICD-10-CM

## 2021-05-18 DIAGNOSIS — R06.83 SNORES: ICD-10-CM

## 2021-05-18 DIAGNOSIS — I87.2 CHRONIC VENOUS INSUFFICIENCY: ICD-10-CM

## 2021-05-18 DIAGNOSIS — R60.0 LOWER EXTREMITY EDEMA: ICD-10-CM

## 2021-05-18 DIAGNOSIS — E66.9 DIABETES MELLITUS TYPE 2 IN OBESE: ICD-10-CM

## 2021-05-18 LAB — EKG IMPRESSION: NORMAL

## 2021-05-18 PROCEDURE — 99204 OFFICE O/P NEW MOD 45 MIN: CPT | Performed by: INTERNAL MEDICINE

## 2021-05-18 PROCEDURE — 93000 ELECTROCARDIOGRAM COMPLETE: CPT | Performed by: INTERNAL MEDICINE

## 2021-05-18 ASSESSMENT — FIBROSIS 4 INDEX: FIB4 SCORE: 1.56

## 2021-05-18 NOTE — PATIENT INSTRUCTIONS
"-Echocardiogram- heart pictures to look at the heart structure and pump function. This will exclude a heart cause of leg swelling.    -Leg swelling is often related to the vein incompetence and not harmful, just cosmetic.     Coronary artery calcification:    A CT scan is a still image of the body but the heart is always moving so everything seen around the heart is blurry.  \"Coronary artery calcification\" is a way of describing that you have calcified disease in the blood vessels to the heart. The CT scan cannot tell if the calcification is on the outside or inside, it simply tells whether or not it is present.  A Calcium Score CT scan gives an objective measurement to the amount of calcification. Most people over 70 have some degree of calcified heart disease so I typically do not order calcium scores and I am not alarmed by calcified heart disease on a regular CT scan.     Blockages in the heart artery should only have a stent or bypass if they are more than 70% blocked (at which point most people have symptoms such as chest pain or unusual shortness of breath with activity that goes away with rest).   People have a sudden heart attack should probably have a stent placed in the heart.   People have chest pain that limits their ability to function could consider having a stent placed in the heart.  Otherwise, medication and lifestyle changes are the preferred treatment. Stents placed outside of severe symptoms or sudden heart attacks do not reduce mortality (likelihood of dying from a heart attack) and often people end up need a stent within a stent.  Lifestyle and medications are more important than stents or bypass under most circumstances.    The only things to do for calcified heart disease to lower your risk of sudden heart attack (or stroke which is the same disease but a different location-the brain):    -Take a statin medication (as a vascular medication, not as a cholesterol medication) which is our most " "powerful weapon to stabilize the plaques making them less likely to rupture open and cause a sudden heart attack. You are already doing this.  -Keep LDL cholesterol under 100, you are there at 70.  -Control blood pressure, under 130/80, ideally closer to 120/80, you are there.  -Control blood sugar, you are there, your A1C is 6.8.  -Don't smoke, this is good.  -Eat a heart healthy diet that reduces inflammation: Pritikin, Mediterranean, Vegetarian, Vegan  -Get regular exercise: 150 minutes of moderate exercise OR 75 minutes of very vigorous exercise every week. This should get easier over time. If you cannot make progress then we may need to take a closer look at your fatigue but so far your testing is normal.  -Know the signs and symptoms of heart attack and stroke and when to call 9-1-1.    Resources to learn more:  American Heart Association   Www.Cardiosmart.org, sponsored by the American College of cardiology.    Signs of a stroke: sudden inability to talk, smile on one side, confusion, inability to move arm/leg on one side, numbness/tingling of arm/leg on one side that is not typical. \"Think FAST.\"  F=face drooping  A=arm weakness  S=Speech difficulty  T= time to call 911 (do not give ride to someone, call ambulance to alert the hospital to start stroke protocol)    Signs of a heart attack: Anything very intense coming from the chest that lasts more than 15 minutes, consider calling 911.  -Pain or pressure in the chest that is intense, lasts more than 15 minutes, not explained by other known reasons such as known/similar heart burn  -It can feel like severe heart burn but associated with nausea, vomiting  -It can be vague pain that radiates to the neck, jaw, shoulders, arm/arms, wrap around your back or even cause a toothache  -Can be associated with unusual shortness of breath at rest or sense of impending doom      -Knee high compression socks, based on shoe size and amount of compression.  I recommend " larger size to avoid toe compression.  Start with 15-20 mmHg of pressure, if too much, can go down to 8-15 mmHg.    There are also 20-30 mmHg but these tend to be very tight.  Can buy online.    -One possible reversible cause of daytime fatigue can be sleep apnea. Consider testing, Dr. Crocker can address this.    -When I get the echo results I will send a [employer] message. You should always hear results of testing within 5 days with my interpretation, if you do not, send a [employer] message or call the office: [phone].    -I will see you back in 3 months, see how you how are progressing after starting regular exercise.

## 2021-05-18 NOTE — LETTER
[facility] [address],   [address]nd [address]  [place] [address]  Phone: [phone]  Fax: [phone]              Lucero Palm  1945    Encounter Date: 2021    Maribel Byers M.D.          PROGRESS NOTE:  Subjective:   Chief Complaint:   Chief Complaint   Patient presents with   • Abnormal EKG       Lucero Palm is a 75 y.o. female who is referred by Dr. Crocker for abnormal ECG, coronary artery calcification, fatigue, left LE edema.    Has cough in the morning.  Some neuropathy pain, has to take gabapentin.  Mild LE edema.    Noting fatigue, lack of energy.    Has HLP, on statin, , now 68, TG up also.    Has DM, oral meds only.    She is not limited by chest pain, pressure or tightness with activity.   No significant dyspnea on exertion, orthopnea.  No significant palpitations, lightheadedness, or presyncope/syncope.   No symptoms of leg claudication.   No stroke/TIA like symptoms.    No prior hypertension.    No family history of premature coronary artery disease.  Mother  at 42 of breast CA, she was 21 yo with a 6 month old, then had to raise her siblings.  No prior smoking history.    No history of autoimmune disease such as lupus or rheumatoid arthritis.  No chronic kidney disease.  No ETOH overuse.  No caffeine overuse.  No recreation substance use.  No hx asthma.      Remotely saw Dr. Araiza.  Had echo, CAC.      [place]  I have seen her , Mich Palm, I saw him in the hospital.  Her daughter is Clementina Palm, works in geriatrics.    DATA REVIEWED by me:  ECG (my personal interpretation) 21  Sinus, 82    Echo   No prior study is available for comparison.   The left ventricle was normal in size.  Mild concentric left ventricular hypertrophy.  Left ventricular ejection fraction is visually estimated to be 65%.  Moderately dilated right ventricle.  Enlarged right  atrium.  Trace aortic insufficiency.  Mild tricuspid regurgitation.  Mild pulmonic insufficiency.  Normal pericardium without effusion.    CAC   Coronary calcification:  LMA - 0.0  LCX - 0.0  LAD - 88.8  RCA - 57.8  PDA - 0.0     Calcium score:  146.6     Mild degree of bronchiectasis is noted in the right lower pulmonary lobe.       Most recent labs:       Lab Results   Component Value Date/Time    WBC 8.7 2021 11:17 AM    HEMOGLOBIN 15.9 2021 11:17 AM    HEMATOCRIT 50.0 (H) 2021 11:17 AM    MCV 89.3 2021 11:17 AM      Lab Results   Component Value Date/Time    SODIUM 138 2021 11:18 AM    POTASSIUM 4.3 2021 11:18 AM    CHLORIDE 102 2021 11:18 AM    CO2 26 2021 11:18 AM    GLUCOSE 117 (H) 2021 11:18 AM    BUN 12 2021 11:18 AM    CREATININE 0.61 2021 11:18 AM      Lab Results   Component Value Date/Time    ASTSGOT 20 2021 11:18 AM    ALTSGPT 26 2021 11:18 AM    ALBUMIN 4.1 2021 11:18 AM      Lab Results   Component Value Date/Time    CHOLSTRLTOT 143 2021 11:18 AM    LDL 70 2021 11:18 AM    HDL 42 2021 11:18 AM    TRIGLYCERIDE 155 (H) 2021 11:18 AM     No results for input(s): NTPROBNP, TROPONINT in the last 72 hours.      Past Medical History:   Diagnosis Date   • Arthritis     foot, left thumb   • Cataract     kimberley IOL   • Diabetes (HCC)     oral medication   • GERD (gastroesophageal reflux disease)    • High cholesterol    • Pain     right foot     Past Surgical History:   Procedure Laterality Date   • LITHOTRIPSY     • OTHER      D&C   • INGUINAL HERNIA REPAIR Left 2009   • OTHER  1972    back surgery     Family History   Problem Relation Age of Onset   • Breast Cancer Mother 42   • Cancer Mother          at 42 of breast CA   • Breast Cancer Sister    • Heart Disease Neg Hx      Social History     Socioeconomic History   • Marital status:      Spouse name: Not on file   • Number of  children: Not on file   • Years of education: Not on file   • Highest education level: Not on file   Occupational History   • Not on file   Tobacco Use   • Smoking status: Never Smoker   • Smokeless tobacco: Never Used   Substance and Sexual Activity   • Alcohol use: Yes     Alcohol/week: 0.6 oz     Types: 1 Glasses of wine per week     Comment: 2-3 per month   • Drug use: No   • Sexual activity: Never   Other Topics Concern   • Not on file   Social History Narrative   • Not on file     Social Determinants of Health     Financial Resource Strain:    • Difficulty of Paying Living Expenses:    Food Insecurity:    • Worried About Running Out of Food in the Last Year:    • Ran Out of Food in the Last Year:    Transportation Needs:    • Lack of Transportation (Medical):    • Lack of Transportation (Non-Medical):    Physical Activity:    • Days of Exercise per Week:    • Minutes of Exercise per Session:    Stress:    • Feeling of Stress :    Social Connections:    • Frequency of Communication with Friends and Family:    • Frequency of Social Gatherings with Friends and Family:    • Attends Congregational Services:    • Active Member of Clubs or Organizations:    • Attends Club or Organization Meetings:    • Marital Status:    Intimate Partner Violence:    • Fear of Current or Ex-Partner:    • Emotionally Abused:    • Physically Abused:    • Sexually Abused:      Allergies   Allergen Reactions   • Empagliflozin Unspecified   • Linagliptin Unspecified   • Metformin Hcl Unspecified   • Pioglitazone Unspecified   • Xigduo Xr [Dapagliflozin-Metformin Hcl Er]        Current Outpatient Medications   Medication Sig Dispense Refill   • THIAMINE HCL PO Take  by mouth.     • fexofenadine (ALLEGRA ALLERGY) 180 MG tablet Take 180 mg by mouth every day.     • gabapentin (NEURONTIN) 300 MG Cap Take 300 mg by mouth every day.     • COMBIGAN 0.2-0.5 % Solution      • Continuous Blood Gluc Sensor (FREESTYLE ANGEL 14 DAY SENSOR) [facility] [place]  "SUGAR 5 TIMES DAILY. CHANGE SENSOR EVERY 14 DAYS DX CODE E11.65     • Dulaglutide (TRULICITY) 4.5 MG/0.5ML Solution Pen-injector 4.5mg     • rosuvastatin (CRESTOR) 10 MG Tab Take 10 mg by mouth every evening.     • acetaminophen (TYLENOL) 325 MG Tab Take 650 mg by mouth every four hours as needed.       No current facility-administered medications for this visit.       ROS  All others systems reviewed and negative.     Objective:     /70 (BP Location: Left arm, Patient Position: Sitting, BP Cuff Size: Adult)   Pulse 82   Ht 1.778 m (5' 10\")   Wt 110 kg (242 lb)   SpO2 96%  Body mass index is 34.72 kg/m².    General: No acute distress. Well nourished.  HEENT: EOM grossly intact, no scleral icterus, no pharyngeal erythema.   Neck:  No JVD, no bruits, trachea midline  CVS: RRR. Normal S1, S2. No M/R/G. Trace left LE edema.  2+ radial pulses, 2+ PT pulses  Resp: CTAB. No wheezing or crackles/rhonchi. Normal respiratory effort.  Abdomen: Soft, NT, no jayson hepatomegaly, obese.  MSK/Ext: No clubbing or cyanosis.  Skin: Warm and dry, no rashes.  Neurological: CN III-XII grossly intact. No focal deficits.   Psych: A&O x 3, appropriate affect, good judgement        Assessment:     1. Coronary artery calcification seen on CT scan  EKG   2. Diabetes mellitus type 2 in obese (HCC)  EKG   3. Mixed hyperlipidemia     4. Other fatigue  EC-ECHOCARDIOGRAM COMPLETE W/O CONT   5. BMI 34.0-34.9,adult     6. Snores  EC-ECHOCARDIOGRAM COMPLETE W/O CONT   7. Chronic venous insufficiency     8. Lower extremity edema  EC-ECHOCARDIOGRAM COMPLETE W/O CONT       Medical Decision Making:  Today's Assessment / Status / Plan:     -Has CAC, on statin  -Control DM  -BP ok, not on lisinopril at this point  -no need for ASA  -Fatigue not clear, TSH is normal, labs normal  -Snores, fatigue, not particularly sleepy, consider sleep eval  -We discussed lifestyle, exercise  -Echo for LE edema but I suspect venous insuff  -RTC 3 " "months      Written instructions given today:      -Echocardiogram- heart pictures to look at the heart structure and pump function. This will exclude a heart cause of leg swelling.    -Leg swelling is often related to the vein incompetence and not harmful, just cosmetic.     Coronary artery calcification:    A CT scan is a still image of the body but the heart is always moving so everything seen around the heart is blurry.  \"Coronary artery calcification\" is a way of describing that you have calcified disease in the blood vessels to the heart. The CT scan cannot tell if the calcification is on the outside or inside, it simply tells whether or not it is present.  A Calcium Score CT scan gives an objective measurement to the amount of calcification. Most people over 70 have some degree of calcified heart disease so I typically do not order calcium scores and I am not alarmed by calcified heart disease on a regular CT scan.     Blockages in the heart artery should only have a stent or bypass if they are more than 70% blocked (at which point most people have symptoms such as chest pain or unusual shortness of breath with activity that goes away with rest).   People have a sudden heart attack should probably have a stent placed in the heart.   People have chest pain that limits their ability to function could consider having a stent placed in the heart.  Otherwise, medication and lifestyle changes are the preferred treatment. Stents placed outside of severe symptoms or sudden heart attacks do not reduce mortality (likelihood of dying from a heart attack) and often people end up need a stent within a stent.  Lifestyle and medications are more important than stents or bypass under most circumstances.    The only things to do for calcified heart disease to lower your risk of sudden heart attack (or stroke which is the same disease but a different location-the brain):    -Take a statin medication (as a vascular medication, " "not as a cholesterol medication) which is our most powerful weapon to stabilize the plaques making them less likely to rupture open and cause a sudden heart attack. You are already doing this.  -Keep LDL cholesterol under 100, you are there at 70.  -Control blood pressure, under 130/80, ideally closer to 120/80, you are there.  -Control blood sugar, you are there, your A1C is 6.8.  -Don't smoke, this is good.  -Eat a heart healthy diet that reduces inflammation: Pritikin, Mediterranean, Vegetarian, Vegan  -Get regular exercise: 150 minutes of moderate exercise OR 75 minutes of very vigorous exercise every week. This should get easier over time. If you cannot make progress then we may need to take a closer look at your fatigue but so far your testing is normal.  -Know the signs and symptoms of heart attack and stroke and when to call 9-1-1.    Resources to learn more:  American Heart Association   Www.Cardiosmart.org, sponsored by the American College of cardiology.    Signs of a stroke: sudden inability to talk, smile on one side, confusion, inability to move arm/leg on one side, numbness/tingling of arm/leg on one side that is not typical. \"Think FAST.\"  F=face drooping  A=arm weakness  S=Speech difficulty  T= time to call 911 (do not give ride to someone, call ambulance to alert the hospital to start stroke protocol)    Signs of a heart attack: Anything very intense coming from the chest that lasts more than 15 minutes, consider calling 911.  -Pain or pressure in the chest that is intense, lasts more than 15 minutes, not explained by other known reasons such as known/similar heart burn  -It can feel like severe heart burn but associated with nausea, vomiting  -It can be vague pain that radiates to the neck, jaw, shoulders, arm/arms, wrap around your back or even cause a toothache  -Can be associated with unusual shortness of breath at rest or sense of impending doom      -Knee high compression socks, based on shoe " size and amount of compression.  I recommend larger size to avoid toe compression.  Start with 15-20 mmHg of pressure, if too much, can go down to 8-15 mmHg.    There are also 20-30 mmHg but these tend to be very tight.  Can buy online.    -One possible reversible cause of daytime fatigue can be sleep apnea. Consider testing, Dr. Crocker can address this.    -When I get the echo results I will send a [employer] message. You should always hear results of testing within 5 days with my interpretation, if you do not, send a [employer] message or call the office: [phone].    -I will see you back in 3 months, see how you how are progressing after starting regular exercise.      Return in about 3 months (around 8/18/2021).    It is my pleasure to participate in the care of Ms. Palm.  Please do not hesitate to contact me with questions or concerns.    Maribel Byers MD, [facility]  Cardiologist [facility]    Please note that this dictation was created using voice recognition software. I have made every reasonable attempt to correct obvious errors, but it is possible there are errors of grammar and possibly content that I did not discover before finalizing the note.        Jp Crocker, TONIEO.  [id number] [place] [address]  [employer]

## 2021-06-09 ENCOUNTER — OFFICE VISIT (OUTPATIENT)
Dept: MEDICAL GROUP | Facility: LAB | Age: 76
End: 2021-06-09
Payer: MEDICARE

## 2021-06-09 VITALS
OXYGEN SATURATION: 95 % | HEIGHT: 71 IN | BODY MASS INDEX: 33.46 KG/M2 | WEIGHT: 239 LBS | RESPIRATION RATE: 16 BRPM | TEMPERATURE: 96.7 F | SYSTOLIC BLOOD PRESSURE: 130 MMHG | HEART RATE: 78 BPM | DIASTOLIC BLOOD PRESSURE: 85 MMHG

## 2021-06-09 DIAGNOSIS — R49.0 HOARSENESS: ICD-10-CM

## 2021-06-09 DIAGNOSIS — G62.9 NEUROPATHY: ICD-10-CM

## 2021-06-09 PROCEDURE — 99214 OFFICE O/P EST MOD 30 MIN: CPT | Performed by: INTERNAL MEDICINE

## 2021-06-09 RX ORDER — ROSUVASTATIN CALCIUM 10 MG/1
10 TABLET, COATED ORAL EVERY EVENING
Qty: 90 TABLET | Refills: 3 | Status: SHIPPED | OUTPATIENT
Start: 2021-06-09 | End: 2021-09-08 | Stop reason: SDUPTHER

## 2021-06-09 RX ORDER — GABAPENTIN 300 MG/1
300 CAPSULE ORAL DAILY
Qty: 90 CAPSULE | Refills: 3 | Status: SHIPPED
Start: 2021-06-09 | End: 2021-06-09

## 2021-06-09 RX ORDER — PREGABALIN 50 MG/1
50 CAPSULE ORAL DAILY
Qty: 30 CAPSULE | Refills: 3 | Status: SHIPPED | OUTPATIENT
Start: 2021-06-09 | End: 2021-07-09

## 2021-06-09 ASSESSMENT — FIBROSIS 4 INDEX: FIB4 SCORE: 1.56

## 2021-06-10 NOTE — PROGRESS NOTES
CC: Lucero Palm is a 75 y.o. female is suffering from   Chief Complaint   Patient presents with   • Lab Results     1 month follow up         SUBJECTIVE:  1. Neuropathy  Is here for follow-up, continues to have problems with lower extremity neuropathy, we will stop Neurontin and start Lyrica    2. Hoarseness  Patient with hoarseness etiology uncertain we will have patient evaluated by ENT        Past social, family, history:   Social History     Tobacco Use   • Smoking status: Never Smoker   • Smokeless tobacco: Never Used   Substance Use Topics   • Alcohol use: Yes     Alcohol/week: 0.6 oz     Types: 1 Glasses of wine per week     Comment: 2-3 per month   • Drug use: No         MEDICATIONS:    Current Outpatient Medications:   •  rosuvastatin (CRESTOR) 10 MG Tab, Take 1 tablet by mouth every evening., Disp: 90 tablet, Rfl: 3  •  pregabalin (LYRICA) 50 MG capsule, Take 1 capsule by mouth every day for 30 days., Disp: 30 capsule, Rfl: 3  •  THIAMINE HCL PO, Take  by mouth., Disp: , Rfl:   •  fexofenadine (ALLEGRA ALLERGY) 180 MG tablet, Take 180 mg by mouth every day., Disp: , Rfl:   •  COMBIGAN 0.2-0.5 % Solution, , Disp: , Rfl:   •  Dulaglutide (TRULICITY) 4.5 MG/0.5ML Solution Pen-injector, 4.5mg, Disp: , Rfl:   •  acetaminophen (TYLENOL) 325 MG Tab, Take 650 mg by mouth every four hours as needed., Disp: , Rfl:   •  Continuous Blood Gluc Sensor (FREESTYLE ANGEL 14 DAY SENSOR) [facility] [place], CHECK BLOOD SUGAR 5 TIMES DAILY. CHANGE SENSOR EVERY 14 DAYS DX CODE E11.65, Disp: , Rfl:     PROBLEMS:  Patient Active Problem List    Diagnosis Date Noted   • Sciatica of right side without back pain 05/31/2018   • Dyslipidemia 11/07/2017   • Diabetes mellitus type 2 in obese (HCC) 11/07/2017   • Gastroesophageal reflux disease without esophagitis 11/07/2017   • Obesity (BMI 30-39.9) 11/07/2017       REVIEW OF SYSTEMS:  Gen.:  No Nausea, Vomiting, fever, Chills.  Heart: No chest pain.  Lungs:  No shortness of  "Breath.  Psychological: Celso unusual Anxiety depression     PHYSICAL EXAM   Constitutional: Alert, cooperative, not in acute distress.  Cardiovascular:  Rate Rhythm is regular without murmurs rubs clicks.     Thorax & Lungs: Clear to auscultation, no wheezing, rhonchi, or rales  HENT: Normocephalic, Atraumatic.  Eyes: PERRLA, EOMI, Conjunctiva normal.   Neck: Trachia is midline no swelling of the thyroid.   Lymphatic: No lymphadenopathy noted.   Neurologic: Alert & oriented x 3, cranial nerves II through XII are intact, Normal motor function, Normal sensory function, No focal deficits noted.   Psychiatric: Affect normal, Judgment normal, Mood normal.     VITAL SIGNS:/85   Pulse 78   Temp 35.9 °C (96.7 °F) (Temporal)   Resp 16   Ht 1.791 m (5' 10.5\")   Wt 108 kg (239 lb)   SpO2 95%   BMI 33.81 kg/m²     Labs: Reviewed    Assessment:                                                     Plan:    1. Neuropathy  Continue Lyrica stop Neurontin  - pregabalin (LYRICA) 50 MG capsule; Take 1 capsule by mouth every day for 30 days.  Dispense: 30 capsule; Refill: 3    2. Hoarseness  Furl written  - REFERRAL TO ENT        "

## 2021-07-21 ENCOUNTER — TELEPHONE (OUTPATIENT)
Dept: MEDICAL GROUP | Facility: LAB | Age: 76
End: 2021-07-21

## 2021-07-21 DIAGNOSIS — M25.562 CHRONIC PAIN OF LEFT KNEE: ICD-10-CM

## 2021-07-21 DIAGNOSIS — G89.29 CHRONIC PAIN OF LEFT KNEE: ICD-10-CM

## 2021-07-21 DIAGNOSIS — M25.561 CHRONIC PAIN OF RIGHT KNEE: ICD-10-CM

## 2021-07-21 DIAGNOSIS — G89.29 CHRONIC PAIN OF RIGHT KNEE: ICD-10-CM

## 2021-07-21 NOTE — TELEPHONE ENCOUNTER
1. Caller Name: Lucero                         Call Back Number: [phone]        How would the patient prefer to be contacted with a response: Phone call OK to leave a detailed message    Pt goes to PT at [facility] in [place] [phone].  The therapist is suggesting a referral to Dr Rodrigues for her left knee.

## 2021-09-08 ENCOUNTER — OFFICE VISIT (OUTPATIENT)
Dept: MEDICAL GROUP | Facility: LAB | Age: 76
End: 2021-09-08
Payer: MEDICARE

## 2021-09-08 VITALS
WEIGHT: 243.4 LBS | BODY MASS INDEX: 34.07 KG/M2 | DIASTOLIC BLOOD PRESSURE: 64 MMHG | OXYGEN SATURATION: 94 % | HEART RATE: 88 BPM | RESPIRATION RATE: 16 BRPM | SYSTOLIC BLOOD PRESSURE: 120 MMHG | TEMPERATURE: 96.4 F | HEIGHT: 71 IN

## 2021-09-08 DIAGNOSIS — M79.2 NEUROPATHIC PAIN: ICD-10-CM

## 2021-09-08 DIAGNOSIS — E11.69 DIABETES MELLITUS TYPE 2 IN OBESE: Primary | ICD-10-CM

## 2021-09-08 DIAGNOSIS — R05.9 COUGH: ICD-10-CM

## 2021-09-08 DIAGNOSIS — E66.9 OBESITY (BMI 30-39.9): ICD-10-CM

## 2021-09-08 DIAGNOSIS — R23.8 OTHER SKIN CHANGES: ICD-10-CM

## 2021-09-08 DIAGNOSIS — M25.561 ACUTE PAIN OF RIGHT KNEE: ICD-10-CM

## 2021-09-08 DIAGNOSIS — E66.9 DIABETES MELLITUS TYPE 2 IN OBESE: Primary | ICD-10-CM

## 2021-09-08 DIAGNOSIS — M25.512 ACUTE PAIN OF LEFT SHOULDER: ICD-10-CM

## 2021-09-08 PROCEDURE — 99214 OFFICE O/P EST MOD 30 MIN: CPT | Performed by: INTERNAL MEDICINE

## 2021-09-08 RX ORDER — GABAPENTIN 300 MG/1
300 CAPSULE ORAL 3 TIMES DAILY
Qty: 90 CAPSULE | Refills: 3 | Status: SHIPPED | OUTPATIENT
Start: 2021-09-08 | End: 2021-11-02 | Stop reason: SDUPTHER

## 2021-09-08 RX ORDER — ROSUVASTATIN CALCIUM 10 MG/1
10 TABLET, COATED ORAL EVERY EVENING
Qty: 90 TABLET | Refills: 3 | Status: SHIPPED | OUTPATIENT
Start: 2021-09-08 | End: 2022-03-24 | Stop reason: SDUPTHER

## 2021-09-08 RX ORDER — FLUTICASONE PROPIONATE 50 MCG
SPRAY, SUSPENSION (ML) NASAL
COMMUNITY
Start: 2021-08-10 | End: 2021-11-17

## 2021-09-08 RX ORDER — HYDROCODONE BITARTRATE AND ACETAMINOPHEN 5; 325 MG/1; MG/1
1 TABLET ORAL EVERY 4 HOURS PRN
Qty: 20 TABLET | Refills: 0 | Status: SHIPPED | OUTPATIENT
Start: 2021-09-08 | End: 2021-09-13

## 2021-09-08 RX ORDER — GABAPENTIN 300 MG/1
CAPSULE ORAL
COMMUNITY
End: 2021-09-08 | Stop reason: SDUPTHER

## 2021-09-08 ASSESSMENT — FIBROSIS 4 INDEX: FIB4 SCORE: 1.59

## 2021-09-09 ENCOUNTER — APPOINTMENT (OUTPATIENT)
Dept: RADIOLOGY | Facility: IMAGING CENTER | Age: 76
End: 2021-09-09
Attending: INTERNAL MEDICINE
Payer: MEDICARE

## 2021-09-09 ENCOUNTER — APPOINTMENT (OUTPATIENT)
Dept: URGENT CARE | Facility: CLINIC | Age: 76
End: 2021-09-09
Payer: MEDICARE

## 2021-09-09 DIAGNOSIS — R05.9 COUGH: ICD-10-CM

## 2021-09-09 DIAGNOSIS — M25.512 ACUTE PAIN OF LEFT SHOULDER: ICD-10-CM

## 2021-09-09 PROCEDURE — 71046 X-RAY EXAM CHEST 2 VIEWS: CPT | Mod: TC | Performed by: INTERNAL MEDICINE

## 2021-09-09 PROCEDURE — 73030 X-RAY EXAM OF SHOULDER: CPT | Mod: TC,LT | Performed by: INTERNAL MEDICINE

## 2021-09-09 PROCEDURE — 73562 X-RAY EXAM OF KNEE 3: CPT | Mod: TC,RT | Performed by: INTERNAL MEDICINE

## 2021-09-29 ENCOUNTER — HOSPITAL ENCOUNTER (OUTPATIENT)
Dept: CARDIOLOGY | Facility: MEDICAL CENTER | Age: 76
End: 2021-09-29
Attending: INTERNAL MEDICINE
Payer: MEDICARE

## 2021-09-29 DIAGNOSIS — R06.83 SNORES: ICD-10-CM

## 2021-09-29 DIAGNOSIS — R60.0 LOWER EXTREMITY EDEMA: ICD-10-CM

## 2021-09-29 DIAGNOSIS — R53.83 OTHER FATIGUE: ICD-10-CM

## 2021-09-29 LAB
LV EJECT FRACT  99904: 75
LV EJECT FRACT MOD 2C 99903: 65.85
LV EJECT FRACT MOD 4C 99902: 79.4
LV EJECT FRACT MOD BP 99901: 72.87

## 2021-09-29 PROCEDURE — 93306 TTE W/DOPPLER COMPLETE: CPT

## 2021-09-29 PROCEDURE — 93306 TTE W/DOPPLER COMPLETE: CPT | Mod: 26 | Performed by: INTERNAL MEDICINE

## 2021-10-07 ENCOUNTER — APPOINTMENT (RX ONLY)
Dept: URBAN - METROPOLITAN AREA CLINIC 31 | Facility: CLINIC | Age: 76
Setting detail: DERMATOLOGY
End: 2021-10-07

## 2021-10-07 DIAGNOSIS — L81.4 OTHER MELANIN HYPERPIGMENTATION: ICD-10-CM

## 2021-10-07 DIAGNOSIS — Z71.89 OTHER SPECIFIED COUNSELING: ICD-10-CM

## 2021-10-07 DIAGNOSIS — D18.0 HEMANGIOMA: ICD-10-CM

## 2021-10-07 DIAGNOSIS — D22 MELANOCYTIC NEVI: ICD-10-CM

## 2021-10-07 DIAGNOSIS — L82.1 OTHER SEBORRHEIC KERATOSIS: ICD-10-CM

## 2021-10-07 DIAGNOSIS — L82.0 INFLAMED SEBORRHEIC KERATOSIS: ICD-10-CM

## 2021-10-07 DIAGNOSIS — L85.3 XEROSIS CUTIS: ICD-10-CM

## 2021-10-07 PROBLEM — D18.01 HEMANGIOMA OF SKIN AND SUBCUTANEOUS TISSUE: Status: ACTIVE | Noted: 2021-10-07

## 2021-10-07 PROBLEM — D22.5 MELANOCYTIC NEVI OF TRUNK: Status: ACTIVE | Noted: 2021-10-07

## 2021-10-07 PROCEDURE — ? LIQUID NITROGEN

## 2021-10-07 PROCEDURE — ? COUNSELING

## 2021-10-07 PROCEDURE — 17110 DESTRUCTION B9 LES UP TO 14: CPT

## 2021-10-07 PROCEDURE — 99203 OFFICE O/P NEW LOW 30 MIN: CPT | Mod: 25

## 2021-10-07 ASSESSMENT — LOCATION SIMPLE DESCRIPTION DERM
LOCATION SIMPLE: RIGHT PRETIBIAL REGION
LOCATION SIMPLE: LEFT ANKLE
LOCATION SIMPLE: RIGHT UPPER BACK
LOCATION SIMPLE: RIGHT LOWER BACK
LOCATION SIMPLE: LEFT FOREARM

## 2021-10-07 ASSESSMENT — LOCATION DETAILED DESCRIPTION DERM
LOCATION DETAILED: LEFT ANKLE
LOCATION DETAILED: RIGHT INFERIOR UPPER BACK
LOCATION DETAILED: LEFT VENTRAL DISTAL FOREARM
LOCATION DETAILED: RIGHT INFERIOR MEDIAL MIDBACK
LOCATION DETAILED: RIGHT DISTAL PRETIBIAL REGION
LOCATION DETAILED: RIGHT SUPERIOR UPPER BACK

## 2021-10-07 ASSESSMENT — LOCATION ZONE DERM
LOCATION ZONE: TRUNK
LOCATION ZONE: LEG
LOCATION ZONE: ARM

## 2021-10-07 NOTE — PROCEDURE: MIPS QUALITY
Detail Level: Detailed
Quality 111:Pneumonia Vaccination Status For Older Adults: Pneumococcal Vaccination not Administered or Previously Received, Reason not Otherwise Specified
Quality 226: Preventive Care And Screening: Tobacco Use: Screening And Cessation Intervention: Patient screened for tobacco use and is an ex/non-smoker
Quality 130: Documentation Of Current Medications In The Medical Record: Current Medications Documented
Quality 431: Preventive Care And Screening: Unhealthy Alcohol Use - Screening: Patient not identified as an unhealthy alcohol user when screened for unhealthy alcohol use using a systematic screening method

## 2021-10-07 NOTE — PROCEDURE: LIQUID NITROGEN
Render Note In Bullet Format When Appropriate: No
Consent: The patient's consent was obtained including but not limited to risks of crusting, scabbing, blistering, scarring, darker or lighter pigmentary change, recurrence, incomplete removal and infection.
Medical Necessity Information: It is in your best interest to select a reason for this procedure from the list below. All of these items fulfill various CMS LCD requirements except the new and changing color options.
Show Aperture Variable?: Yes
Post-Care Instructions: I reviewed with the patient in detail post-care instructions. Patient is to wear sunprotection, and avoid picking at any of the treated lesions. Pt may apply Vaseline to crusted or scabbing areas.
Detail Level: Detailed
Medical Necessity Clause: This procedure was medically necessary because the lesions that were treated were:

## 2021-10-31 NOTE — PROGRESS NOTES
Subjective:   Chief Complaint:   Chief Complaint   Patient presents with   • Coronary Artery Disease     Fv Dx: Coronary artery calcification seen on CT scan       Lucero Palm is a 76 y.o. female who returns today for abnormal ECG, coronary artery calcification, fatigue, left LE edema.    Remotely saw Dr. Araiza.  Had echo, normal EF.  Had CAC, score 146.  On statin.    Has cough in the morning.  Mild LE edema, worse on left, probably venous insufficiency.    Some neuropathy pain, has to take gabapentin.  Noting fatigue, lack of energy, thinks gabapentin for neuropathy makes this worse.  Has gained weight during covid.  BMI 35.    Has HLP, on statin, , now 70, TG up also but just a bit.    Has DM, trulicity now, seeing Endo.    She is not limited by chest pain, pressure or tightness with activity.   No significant dyspnea on exertion, orthopnea.  No significant palpitations, lightheadedness, or presyncope/syncope.   No symptoms of leg claudication.   No stroke/TIA like symptoms.    No prior hypertension.  BP up a bit, but overall no HTN.    No family history of premature coronary artery disease.  Mother  at 42 of breast CA, she was 19 yo with a 6 month old, then had to raise her siblings.  No prior smoking history.    No history of autoimmune disease such as lupus or rheumatoid arthritis.  No chronic kidney disease.  No ETOH overuse.  No caffeine overuse.  No recreation substance use.  No hx asthma.    [place]  I have seen her , Mich Palm, I saw him in the hospital.  Her daughter is Clementina Palm, works in geriatrics.    DATA REVIEWED by me:  ECG (my personal interpretation) 21  Sinus, 82    Echo 21  The left ventricular ejection fraction is visually estimated to be >  75%, hyperdynamic.  Mild concentric left ventricular hypertrophy.     Compared to the images of the prior study 2017, no significant   change.    Echo   No prior study is available  for comparison.   The left ventricle was normal in size.  Mild concentric left ventricular hypertrophy.  Left ventricular ejection fraction is visually estimated to be 65%.  Moderately dilated right ventricle.  Enlarged right atrium.  Trace aortic insufficiency.  Mild tricuspid regurgitation.  Mild pulmonic insufficiency.  Normal pericardium without effusion.    CAC   Coronary calcification:  LMA - 0.0  LCX - 0.0  LAD - 88.8  RCA - 57.8  PDA - 0.0     Calcium score:  146.6     Mild degree of bronchiectasis is noted in the right lower pulmonary lobe.       Most recent labs:       Lab Results   Component Value Date/Time    WBC 8.7 04/29/2021 11:17 AM    HEMOGLOBIN 15.9 04/29/2021 11:17 AM    HEMATOCRIT 50.0 (H) 04/29/2021 11:17 AM    MCV 89.3 04/29/2021 11:17 AM      Lab Results   Component Value Date/Time    SODIUM 138 04/29/2021 11:18 AM    POTASSIUM 4.3 04/29/2021 11:18 AM    CHLORIDE 102 04/29/2021 11:18 AM    CO2 26 04/29/2021 11:18 AM    GLUCOSE 117 (H) 04/29/2021 11:18 AM    BUN 12 04/29/2021 11:18 AM    CREATININE 0.61 04/29/2021 11:18 AM      Lab Results   Component Value Date/Time    ASTSGOT 20 04/29/2021 11:18 AM    ALTSGPT 26 04/29/2021 11:18 AM    ALBUMIN 4.1 04/29/2021 11:18 AM      Lab Results   Component Value Date/Time    CHOLSTRLTOT 143 04/29/2021 11:18 AM    LDL 70 04/29/2021 11:18 AM    HDL 42 04/29/2021 11:18 AM    TRIGLYCERIDE 155 (H) 04/29/2021 11:18 AM     No results for input(s): NTPROBNP, TROPONINT in the last 72 hours.      Past Medical History:   Diagnosis Date   • Arthritis     foot, left thumb   • Cataract     kimberley IOL   • Diabetes (HCC)     oral medication   • GERD (gastroesophageal reflux disease)    • High cholesterol    • Pain     right foot     Past Surgical History:   Procedure Laterality Date   • LITHOTRIPSY  2015   • OTHER  2011    D&C   • INGUINAL HERNIA REPAIR Left 2009   • OTHER  1972    back surgery     Family History   Problem Relation Age of Onset   • Breast Cancer  Mother 42   • Cancer Mother          at 42 of breast CA   • Breast Cancer Sister    • Heart Disease Neg Hx      Social History     Socioeconomic History   • Marital status:      Spouse name: Not on file   • Number of children: Not on file   • Years of education: Not on file   • Highest education level: Not on file   Occupational History   • Not on file   Tobacco Use   • Smoking status: Never Smoker   • Smokeless tobacco: Never Used   Substance and Sexual Activity   • Alcohol use: Yes     Alcohol/week: 0.6 oz     Types: 1 Glasses of wine per week     Comment: 2-3 per month   • Drug use: No   • Sexual activity: Never   Other Topics Concern   • Not on file   Social History Narrative   • Not on file     Social Determinants of Health     Financial Resource Strain:    • Difficulty of Paying Living Expenses:    Food Insecurity:    • Worried About Running Out of Food in the Last Year:    • Ran Out of Food in the Last Year:    Transportation Needs:    • Lack of Transportation (Medical):    • Lack of Transportation (Non-Medical):    Physical Activity:    • Days of Exercise per Week:    • Minutes of Exercise per Session:    Stress:    • Feeling of Stress :    Social Connections:    • Frequency of Communication with Friends and Family:    • Frequency of Social Gatherings with Friends and Family:    • Attends Rastafari Services:    • Active Member of Clubs or Organizations:    • Attends Club or Organization Meetings:    • Marital Status:    Intimate Partner Violence:    • Fear of Current or Ex-Partner:    • Emotionally Abused:    • Physically Abused:    • Sexually Abused:      No Known Allergies    Current Outpatient Medications   Medication Sig Dispense Refill   • glucose blood (CONTOUR NEXT TEST) strip test sugars     • Continuous Blood Gluc  (FREESTYLE ANGEL 14 DAY READER) Device use reader     • Blood Glucose Monitoring Suppl (CONTOUR NEXT MONITOR) w/Device Kit as directed     • fluticasone (FLONASE) 50  "MCG/ACT nasal spray      • rosuvastatin (CRESTOR) 10 MG Tab Take 1 Tablet by mouth every evening. 90 Tablet 3   • gabapentin (NEURONTIN) 300 MG Cap Take 1 Capsule by mouth 3 times a day. 90 Capsule 3   • THIAMINE HCL PO Take  by mouth.     • fexofenadine (ALLEGRA ALLERGY) 180 MG tablet Take 180 mg by mouth every day.     • COMBIGAN 0.2-0.5 % Solution      • Continuous Blood Gluc Sensor (Bill.comSTYLE ANGEL 14 DAY SENSOR) [facility] [place]LOOD SUGAR 5 TIMES DAILY. CHANGE SENSOR EVERY 14 DAYS DX CODE E11.65     • Dulaglutide (TRULICITY) 4.5 MG/0.5ML Solution Pen-injector 4.5mg     • acetaminophen (TYLENOL) 325 MG Tab Take 650 mg by mouth every four hours as needed.       No current facility-administered medications for this visit.       ROS    All others systems reviewed and negative.     Objective:     /72 (BP Location: Left arm, Patient Position: Sitting, BP Cuff Size: Adult)   Pulse 81   Resp 16   Ht 1.791 m (5' 10.5\")   Wt 114 kg (251 lb)   SpO2 93%  Body mass index is 35.51 kg/m².    General: No acute distress. Well nourished.  HEENT: EOM grossly intact, no scleral icterus, no pharyngeal erythema.   Neck:  No JVD, no bruits, trachea midline  CVS: RRR. Normal S1, S2. No M/R/G. 1+ pitting left LE edema, trace on right.  2+ radial pulses, 2+ PT pulses  Resp: CTAB. No wheezing or crackles/rhonchi. Normal respiratory effort.  Abdomen: Soft, NT, no jayson hepatomegaly, obese.  MSK/Ext: No clubbing or cyanosis.  Skin: Warm and dry, no rashes.  Neurological: CN III-XII grossly intact. No focal deficits.   Psych: A&O x 3, appropriate affect, good judgement    Physical exam performed today and unchanged, except what is noted, compared to 5-18-21      Assessment:     1. Coronary artery calcification seen on CT scan     2. Other fatigue     3. Diabetes mellitus type 2 in obese (HCC)     4. Lower extremity edema     5. Mixed hyperlipidemia     6. BMI 34.0-34.9,adult     7. Chronic venous insufficiency         Medical " Decision Making:  Today's Assessment / Status / Plan:     -Has CAC, on primary statin  -Control DM  -BP ok, not on lisinopril at this point but this would be the first medication  -no need for ASA for primary prevention  -Fatigue not clear, not strictly cardiac  -Snores, fatigue, not particularly sleepy, consider sleep eval  -We discussed lifestyle, exercise  -Lower extremity edema is probably some venous insufficiency  -BMI 35, she wants to lose weight.  -RTC PRN, I would be happy to see her back at any time      Written instructions given today:      -Venous insufficiency of the legs typically needs mechanical support with compression socks during the day.    -There are vein specialists you could see regarding this but I recommend the compression socks first.    -Knee high compression socks, based on shoe size and amount of compression.  I recommend larger shoe size to avoid toe compression.  Start with 15-20 mmHg of pressure, if too much, can go down to 8-15 mmHg.    There are also 20-30 mmHg but these tend to be very tight.  Can buy online.    -We now know that lack of exercise is as risky as smoking or having diabetes in terms of your heart health.  Try to perform a moderate intensity exercise which includes brisk walking (swimming, cycling) at least 150 minutes a week or 30 minutes for 5 days in the week.    -start by doing something every day for 10 to 20 minutes until it becomes a habit.  Work up to 30 minutes 5 days a week.    Please look into the following diets:    Mediterranean diet.  [place] - [facility]  DASH DIET - American Heart Association (particularly for hypertension)  Ornish Diet   Plant paradox, Gundry    Resources to learn more:  American Heart Association   Www.Cardiosmart.org, sponsored by the American College of cardiology.      Return in about 1 year (around 11/1/2022), or if symptoms worsen or fail to improve.    It is my pleasure to participate in the care of Ms.  Néstor.  Please do not hesitate to contact me with questions or concerns.    Maribel Byers MD, [facility]  Cardiologist [facility]    Please note that this dictation was created using voice recognition software. I have made every reasonable attempt to correct obvious errors, but it is possible there are errors of grammar and possibly content that I did not discover before finalizing the note.

## 2021-11-01 ENCOUNTER — OFFICE VISIT (OUTPATIENT)
Dept: CARDIOLOGY | Facility: CLINIC | Age: 76
End: 2021-11-01
Payer: MEDICARE

## 2021-11-01 VITALS
OXYGEN SATURATION: 93 % | HEART RATE: 81 BPM | RESPIRATION RATE: 16 BRPM | HEIGHT: 71 IN | BODY MASS INDEX: 35.14 KG/M2 | SYSTOLIC BLOOD PRESSURE: 138 MMHG | DIASTOLIC BLOOD PRESSURE: 72 MMHG | WEIGHT: 251 LBS

## 2021-11-01 DIAGNOSIS — E78.2 MIXED HYPERLIPIDEMIA: ICD-10-CM

## 2021-11-01 DIAGNOSIS — R60.0 LOWER EXTREMITY EDEMA: ICD-10-CM

## 2021-11-01 DIAGNOSIS — E11.69 DIABETES MELLITUS TYPE 2 IN OBESE: ICD-10-CM

## 2021-11-01 DIAGNOSIS — I25.10 CORONARY ARTERY CALCIFICATION SEEN ON CT SCAN: ICD-10-CM

## 2021-11-01 DIAGNOSIS — E66.9 DIABETES MELLITUS TYPE 2 IN OBESE: ICD-10-CM

## 2021-11-01 DIAGNOSIS — I87.2 CHRONIC VENOUS INSUFFICIENCY: ICD-10-CM

## 2021-11-01 DIAGNOSIS — R53.83 OTHER FATIGUE: ICD-10-CM

## 2021-11-01 PROCEDURE — 99214 OFFICE O/P EST MOD 30 MIN: CPT | Performed by: INTERNAL MEDICINE

## 2021-11-01 RX ORDER — BLOOD-GLUCOSE METER
EACH MISCELLANEOUS
COMMUNITY
Start: 2021-10-19 | End: 2022-01-12

## 2021-11-01 RX ORDER — FLASH GLUCOSE SCANNING READER
EACH MISCELLANEOUS
COMMUNITY
Start: 2021-10-05 | End: 2022-01-12

## 2021-11-01 ASSESSMENT — FIBROSIS 4 INDEX: FIB4 SCORE: 1.59

## 2021-11-01 NOTE — PATIENT INSTRUCTIONS
-Venous insufficiency of the legs typically needs mechanical support with compression socks during the day.    -There are vein specialists you could see regarding this but I recommend the compression socks first.    -Knee high compression socks, based on shoe size and amount of compression.  I recommend larger shoe size to avoid toe compression.  Start with 15-20 mmHg of pressure, if too much, can go down to 8-15 mmHg.    There are also 20-30 mmHg but these tend to be very tight.  Can buy online.    -We now know that lack of exercise is as risky as smoking or having diabetes in terms of your heart health.  Try to perform a moderate intensity exercise which includes brisk walking (swimming, cycling) at least 150 minutes a week or 30 minutes for 5 days in the week.    -start by doing something every day for 10 to 20 minutes until it becomes a habit.  Work up to 30 minutes 5 days a week.    Please look into the following diets:    Mediterranean diet.  [place] [facility]  DASH DIET - American Heart Association (particularly for hypertension)  Ornish Diet   Plant jose c, [place]    Resources to learn more:  American Heart Association   Www.[email or web address], sponsored by the American College of cardiology.

## 2021-11-01 NOTE — LETTER
[facility] [address],   2nd Floor  [place] [address]  Phone: [phone]  Fax: [phone]              Lucero Palm  1945    Encounter Date: 2021    Maribel Byers M.D.          PROGRESS NOTE:  Subjective:   Chief Complaint:   Chief Complaint   Patient presents with   • Coronary Artery Disease     Fv Dx: Coronary artery calcification seen on CT scan       Lucero Palm is a 76 y.o. female who returns today for abnormal ECG, coronary artery calcification, fatigue, left LE edema.    Remotely saw Dr. Araiza.  Had echo, normal EF.  Had CAC, score 146.  On statin.    Has cough in the morning.  Some neuropathy pain, has to take gabapentin.  Mild LE edema, probably venous insufficiency.    Noting fatigue, lack of energy.    Has HLP, on statin, , now 70, TG up also but just a bit.    Has DM, trulicity now.    She is not limited by chest pain, pressure or tightness with activity.   No significant dyspnea on exertion, orthopnea.  No significant palpitations, lightheadedness, or presyncope/syncope.   No symptoms of leg claudication.   No stroke/TIA like symptoms.    No prior hypertension.    No family history of premature coronary artery disease.  Mother  at 42 of breast CA, she was 21 yo with a 6 month old, then had to raise her siblings.  No prior smoking history.    No history of autoimmune disease such as lupus or rheumatoid arthritis.  No chronic kidney disease.  No ETOH overuse.  No caffeine overuse.  No recreation substance use.  No hx asthma.    [place].  I have seen her , Mich Palm, I saw him in the hospital.  Her daughter is Clementina Palm, works in geriatrics.    DATA REVIEWED by me:  ECG (my personal interpretation) 21  Sinus, 82    Echo 21  The left ventricular ejection fraction is visually estimated to be >  75%, hyperdynamic.  Mild concentric left ventricular  hypertrophy.     Compared to the images of the prior study 11/16/2017, no significant   change.    Echo   No prior study is available for comparison.   The left ventricle was normal in size.  Mild concentric left ventricular hypertrophy.  Left ventricular ejection fraction is visually estimated to be 65%.  Moderately dilated right ventricle.  Enlarged right atrium.  Trace aortic insufficiency.  Mild tricuspid regurgitation.  Mild pulmonic insufficiency.  Normal pericardium without effusion.    CAC   Coronary calcification:  LMA - 0.0  LCX - 0.0  LAD - 88.8  RCA - 57.8  PDA - 0.0     Calcium score:  146.6     Mild degree of bronchiectasis is noted in the right lower pulmonary lobe.       Most recent labs:       Lab Results   Component Value Date/Time    WBC 8.7 04/29/2021 11:17 AM    HEMOGLOBIN 15.9 04/29/2021 11:17 AM    HEMATOCRIT 50.0 (H) 04/29/2021 11:17 AM    MCV 89.3 04/29/2021 11:17 AM      Lab Results   Component Value Date/Time    SODIUM 138 04/29/2021 11:18 AM    POTASSIUM 4.3 04/29/2021 11:18 AM    CHLORIDE 102 04/29/2021 11:18 AM    CO2 26 04/29/2021 11:18 AM    GLUCOSE 117 (H) 04/29/2021 11:18 AM    BUN 12 04/29/2021 11:18 AM    CREATININE 0.61 04/29/2021 11:18 AM      Lab Results   Component Value Date/Time    ASTSGOT 20 04/29/2021 11:18 AM    ALTSGPT 26 04/29/2021 11:18 AM    ALBUMIN 4.1 04/29/2021 11:18 AM      Lab Results   Component Value Date/Time    CHOLSTRLTOT 143 04/29/2021 11:18 AM    LDL 70 04/29/2021 11:18 AM    HDL 42 04/29/2021 11:18 AM    TRIGLYCERIDE 155 (H) 04/29/2021 11:18 AM     No results for input(s): NTPROBNP, TROPONINT in the last 72 hours.      Past Medical History:   Diagnosis Date   • Arthritis     foot, left thumb   • Cataract     kimberley IOL   • Diabetes (HCC)     oral medication   • GERD (gastroesophageal reflux disease)    • High cholesterol    • Pain     right foot     Past Surgical History:   Procedure Laterality Date   • LITHOTRIPSY  2015   • OTHER  2011    D&C    • INGUINAL HERNIA REPAIR Left    • OTHER  1972    back surgery     Family History   Problem Relation Age of Onset   • Breast Cancer Mother 42   • Cancer Mother          at 42 of breast CA   • Breast Cancer Sister    • Heart Disease Neg Hx      Social History     Socioeconomic History   • Marital status:      Spouse name: Not on file   • Number of children: Not on file   • Years of education: Not on file   • Highest education level: Not on file   Occupational History   • Not on file   Tobacco Use   • Smoking status: Never Smoker   • Smokeless tobacco: Never Used   Substance and Sexual Activity   • Alcohol use: Yes     Alcohol/week: 0.6 oz     Types: 1 Glasses of wine per week     Comment: 2-3 per month   • Drug use: No   • Sexual activity: Never   Other Topics Concern   • Not on file   Social History Narrative   • Not on file     Social Determinants of Health     Financial Resource Strain:    • Difficulty of Paying Living Expenses:    Food Insecurity:    • Worried About Running Out of Food in the Last Year:    • Ran Out of Food in the Last Year:    Transportation Needs:    • Lack of Transportation (Medical):    • Lack of Transportation (Non-Medical):    Physical Activity:    • Days of Exercise per Week:    • Minutes of Exercise per Session:    Stress:    • Feeling of Stress :    Social Connections:    • Frequency of Communication with Friends and Family:    • Frequency of Social Gatherings with Friends and Family:    • Attends Mandae[employer] Services:    • Active Member of Clubs or Organizations:    • Attends Club or Organization Meetings:    • Marital Status:    Intimate Partner Violence:    • Fear of Current or Ex-Partner:    • Emotionally Abused:    • Physically Abused:    • Sexually Abused:      No Known Allergies    Current Outpatient Medications   Medication Sig Dispense Refill   • glucose blood (CONTOUR NEXT TEST) strip test sugars     • Continuous Blood Gluc  (FREESTYLE ANGEL 14 DAY  "READER) Device use reader     • Blood Glucose Monitoring Suppl (CONTOUR NEXT MONITOR) w/Device Kit as directed     • fluticasone (FLONASE) 50 MCG/ACT nasal spray      • rosuvastatin (CRESTOR) 10 MG Tab Take 1 Tablet by mouth every evening. 90 Tablet 3   • gabapentin (NEURONTIN) 300 MG Cap Take 1 Capsule by mouth 3 times a day. 90 Capsule 3   • THIAMINE HCL PO Take  by mouth.     • fexofenadine (ALLEGRA ALLERGY) 180 MG tablet Take 180 mg by mouth every day.     • COMBIGAN 0.2-0.5 % Solution      • Continuous Blood Gluc Sensor (FREESTYLE ANGEL 14 DAY SENSOR) [facility] CHECK BLOOD SUGAR 5 TIMES DAILY. CHANGE SENSOR EVERY 14 DAYS DX CODE E11.65     • Dulaglutide (TRULICITY) 4.5 MG/0.5ML Solution Pen-injector 4.5mg     • acetaminophen (TYLENOL) 325 MG Tab Take 650 mg by mouth every four hours as needed.       No current facility-administered medications for this visit.       ROS    All others systems reviewed and negative.     Objective:     /72 (BP Location: Left arm, Patient Position: Sitting, BP Cuff Size: Adult)   Pulse 81   Resp 16   Ht 1.791 m (5' 10.5\")   Wt 114 kg (251 lb)   SpO2 93%  Body mass index is 35.51 kg/m².    General: No acute distress. Well nourished.  HEENT: EOM grossly intact, no scleral icterus, no pharyngeal erythema.   Neck:  No JVD, no bruits, trachea midline  CVS: RRR. Normal S1, S2. No M/R/G. Trace left LE edema.  2+ radial pulses, 2+ PT pulses  Resp: CTAB. No wheezing or crackles/rhonchi. Normal respiratory effort.  Abdomen: Soft, NT, no jayson hepatomegaly, obese.  MSK/Ext: No clubbing or cyanosis.  Skin: Warm and dry, no rashes.  Neurological: CN III-XII grossly intact. No focal deficits.   Psych: A&O x 3, appropriate affect, good judgement    Physical exam performed today and unchanged, except what is noted, compared to 5-18-21      Assessment:     1. Coronary artery calcification seen on CT scan     2. Other fatigue     3. Diabetes mellitus type 2 in obese (HCC)     4. Lower extremity " "edema     5. Mixed hyperlipidemia     6. BMI 34.0-34.9,adult     7. Chronic venous insufficiency         Medical Decision Making:  Today's Assessment / Status / Plan:     -Has CAC, on primary statin  -Control DM  -BP ok, not on lisinopril at this point but this would be the first medication  -no need for ASA for primary prevention  -Fatigue not clear, not strictly cardiac  -Snores, fatigue, not particularly sleepy, consider sleep eval  -We discussed lifestyle, exercise  -Lower extremity edema is probably some venous insufficiency  -RTC 3 months      Written instructions given today:      -Echocardiogram- heart pictures to look at the heart structure and pump function. This will exclude a heart cause of leg swelling.    -Leg swelling is often related to the vein incompetence and not harmful, just cosmetic.     Coronary artery calcification:    A CT scan is a still image of the body but the heart is always moving so everything seen around the heart is blurry.  \"Coronary artery calcification\" is a way of describing that you have calcified disease in the blood vessels to the heart. The CT scan cannot tell if the calcification is on the outside or inside, it simply tells whether or not it is present.  A Calcium Score CT scan gives an objective measurement to the amount of calcification. Most people over 70 have some degree of calcified heart disease so I typically do not order calcium scores and I am not alarmed by calcified heart disease on a regular CT scan.     Blockages in the heart artery should only have a stent or bypass if they are more than 70% blocked (at which point most people have symptoms such as chest pain or unusual shortness of breath with activity that goes away with rest).   People have a sudden heart attack should probably have a stent placed in the heart.   People have chest pain that limits their ability to function could consider having a stent placed in the heart.  Otherwise, medication and " "lifestyle changes are the preferred treatment. Stents placed outside of severe symptoms or sudden heart attacks do not reduce mortality (likelihood of dying from a heart attack) and often people end up need a stent within a stent.  Lifestyle and medications are more important than stents or bypass under most circumstances.    The only things to do for calcified heart disease to lower your risk of sudden heart attack (or stroke which is the same disease but a different location-the brain):    -Take a statin medication (as a vascular medication, not as a cholesterol medication) which is our most powerful weapon to stabilize the plaques making them less likely to rupture open and cause a sudden heart attack. You are already doing this.  -Keep LDL cholesterol under 100, you are there at 70.  -Control blood pressure, under 130/80, ideally closer to 120/80, you are there.  -Control blood sugar, you are there, your A1C is 6.8.  -Don't smoke, this is good.  -Eat a heart healthy diet that reduces inflammation: Pritikin, Mediterranean, Vegetarian, Vegan  -Get regular exercise: 150 minutes of moderate exercise OR 75 minutes of very vigorous exercise every week. This should get easier over time. If you cannot make progress then we may need to take a closer look at your fatigue but so far your testing is normal.  -Know the signs and symptoms of heart attack and stroke and when to call 9-1-1.    Resources to learn more:  American Heart Association   Www.Cardiosmart.org, sponsored by the American College of cardiology.    Signs of a stroke: sudden inability to talk, smile on one side, confusion, inability to move arm/leg on one side, numbness/tingling of arm/leg on one side that is not typical. \"Think FAST.\"  F=face drooping  A=arm weakness  S=Speech difficulty  T= time to call 911 (do not give ride to someone, call ambulance to alert the hospital to start stroke protocol)    Signs of a heart attack: Anything very intense coming " from the chest that lasts more than 15 minutes, consider calling 911.  -Pain or pressure in the chest that is intense, lasts more than 15 minutes, not explained by other known reasons such as known/similar heart burn  -It can feel like severe heart burn but associated with nausea, vomiting  -It can be vague pain that radiates to the neck, jaw, shoulders, arm/arms, wrap around your back or even cause a toothache  -Can be associated with unusual shortness of breath at rest or sense of impending doom      -Knee high compression socks, based on shoe size and amount of compression.  I recommend larger size to avoid toe compression.  Start with 15-20 mmHg of pressure, if too much, can go down to 8-15 mmHg.    There are also 20-30 mmHg but these tend to be very tight.  Can buy online.    -One possible reversible cause of daytime fatigue can be sleep apnea. Consider testing, Dr. Crocker can address this.    -When I get the echo results I will send a [employer] message. You should always hear results of testing within 5 days with my interpretation, if you do not, send a [facility] message or call the office: [phone].    -I will see you back in 3 months, see how you how are progressing after starting regular exercise.      No follow-ups on file.    It is my pleasure to participate in the care of Ms. Palm.  Please do not hesitate to contact me with questions or concerns.    Maribel Byers MD, [facility]  Cardiologist [facility]    Please note that this dictation was created using voice recognition software. I have made every reasonable attempt to correct obvious errors, but it is possible there are errors of grammar and possibly content that I did not discover before finalizing the note.            No Recipients

## 2021-11-02 ENCOUNTER — APPOINTMENT (OUTPATIENT)
Dept: MEDICAL GROUP | Facility: LAB | Age: 76
End: 2021-11-02
Payer: MEDICARE

## 2021-11-02 DIAGNOSIS — M79.2 NEUROPATHIC PAIN: ICD-10-CM

## 2021-11-02 RX ORDER — GABAPENTIN 300 MG/1
300 CAPSULE ORAL 3 TIMES DAILY
Qty: 90 CAPSULE | Refills: 3 | Status: SHIPPED | OUTPATIENT
Start: 2021-11-02 | End: 2021-11-29 | Stop reason: SDUPTHER

## 2021-11-17 ENCOUNTER — OFFICE VISIT (OUTPATIENT)
Dept: NEUROLOGY | Facility: MEDICAL CENTER | Age: 76
End: 2021-11-17
Attending: PSYCHIATRY & NEUROLOGY
Payer: MEDICARE

## 2021-11-17 DIAGNOSIS — E11.42 DIABETIC POLYNEUROPATHY ASSOCIATED WITH TYPE 2 DIABETES MELLITUS (HCC): Primary | ICD-10-CM

## 2021-11-17 PROCEDURE — 99204 OFFICE O/P NEW MOD 45 MIN: CPT | Performed by: PSYCHIATRY & NEUROLOGY

## 2021-11-17 PROCEDURE — 99212 OFFICE O/P EST SF 10 MIN: CPT | Performed by: PSYCHIATRY & NEUROLOGY

## 2021-11-17 ASSESSMENT — ENCOUNTER SYMPTOMS
FALLS: 0
MEMORY LOSS: 0
NECK PAIN: 0
TINGLING: 1
SENSORY CHANGE: 1
FOCAL WEAKNESS: 0

## 2021-11-18 NOTE — PROGRESS NOTES
Subjective     Lucero Palm is a 76 y.o. female who presents with her  Mich, from the office of Dr. Christiano Crocker DO, for consultation, with a history of persistent bilateral lower extremity dysesthesias and pain suggestive of polyneuropathy, though atypical in nature.    ALEK Hope is a very pleasant 68 ajovy right-handed woman whose symptoms in her feet began a couple of years ago, quite mild at the outset, but which have slowly and steadily progressed.  They have always been bilateral though the left seems to be slightly more problematic for her.  The sensory distortions remain below the level of the ankle, she describes occasional burning and stabbing pains in the feet that can extend up the legs, mostly it is a feeling of numbness and discomfort.  She is not aware of any hyperpathia in the skin, but the symptoms are present throughout the day.  The shooting pains feel like they are deep inside the leg.    Though the symptoms increase over the course of the day, there does not seem to be a consistent relationship between sitting and standing.  Still, when she gets into bed, it can interfere with her sleep hygiene.    The shooting pains are episodic, occurring once or twice in a day, seemingly random, but are very brief.  It is the constant aching that drives her nuts.  More recently she has begun to notice edema in the lower extremities, left more than right.  There is a mostly ankle and shin localization.    She denies actual weakness, she does have a generalized fatigue.  In her hand she can have some stiffness in the fingers when she awakens, this is a whole different symptom.  She denies back pain with radiating symptoms into the legs, neck pain with symptoms radiating down the spinal axis or into the upper extremities.    Neurophysiologic studies have not been done.  Vitamin D is 29, TSH 1.3.  Now on gabapentin 300 mg, 3 times daily, this does help her symptoms significantly, the  problem is that it makes her sleepy throughout the day.  If she takes an additional dose at night to help her sleep, she is way more of a slug the following morning.  She has been on no other medications.    Her diabetes is about a 5-year duration, she also has dyslipidemia.  Medical history is negative for CVA, PVD, CAD, malignancy, thyroid disease, hypertension, MS, seizure, neurodegenerative disease, autoimmune disease, liver or kidney disease, or blood dyscrasia.  There is no surgical history of note from my standpoint.    Her mother  at 42 because of breast cancer, her father  at 93 years of age.  Her sister also has a history of cancer, her brother is alive and well.  Her 3 children are also alive and well.  No one has a history of similar symptoms, nor been diagnosed with a neuropathic disease.    She rarely drinks alcohol, there is no history of tobacco or vape use.  She has no history of MSA.  She is on gabapentin 300 mg, 3 times daily, Trulicity, Crestor, and Tylenol as needed.    Review of Systems   Cardiovascular: Positive for leg swelling.   Musculoskeletal: Negative for falls and neck pain.   Neurological: Positive for tingling and sensory change. Negative for focal weakness.   Psychiatric/Behavioral: Negative for memory loss.   All other systems reviewed and are negative.      Objective      Physical Exam    She appears in no acute distress.  There is no malar rash, jaw or temporal tenderness, jaw claudication, or allodynia.  Her neck is supple, range of motion is full.  Cardiac evaluation reveals a regular rhythm.  Lower extremities show edema distal to the midshin bilaterally, left more pronounced than right.  The extremities are cool to touch.     Neurological Exam    Fully oriented, there is no aphasia, agnosia, apraxia, or inattention.    PERRLA/EOMI, visual fields are full, facial movements are symmetric.  There is no bulbar dysfunction, the tongue and uvula are midline.  Sensory exam is  intact to temperature, light touch and pinprick bilaterally.  Shoulder shrug and head rotation are intact and symmetric.  Jaw jerk is absent, jaw movements are intact.    Musculoskeletal exam reveals normal tone bilaterally, there is no tremor, asterixis, or drift.  Strength is intact at 5/5 throughout.  Knee jerks are present, easily elicited, without asymmetry.  Ankle jerks are absent bilaterally, even with distraction.  Both toes are downgoing.    She stands slowly, with some caution.  There is no obvious discomfort.  Station is slightly widened.  Repetitive movements in the feet are intact, symmetric in amplitude and frequency.  There is no appendicular dystaxia with any of the extremities.    Sensory exam does reveal a stocking pattern decrement of vibration below the knee on the right, shin on the left, JPS distal to the ankles.  There is a gradient decrement of temperature below the midshin bilaterally, the same for pinprick.  This demarcation is more clear in the left lower extremity.    Assessment & Plan     1. Diabetic polyneuropathy associated with type 2 diabetes mellitus (HCC)  Though her presentation may seem a little atypical, the abnormalities and findings on examination are still consistent with a polyneuropathy, and if present, most likely related to her diabetes.  EMG/NCV studies are obviously necessary to better delineate and define the process.  There is nothing to suggest a central nervous system/spinal localization, certainly nothing to extend above the neck.  Thus imaging is not necessary.  Multilevel lumbosacral radiculopathies are even less likely.  At present this is pure sensory in nature, there may be asymptomatic motor involvement, thus neurophysiologic studies are critical.  Though on gabapentin 300 mg, 3 times daily, higher doses would not be tolerated, we will probably need to add something else moving forwards.  This is not likely vascular in nature, and though there is lower  extremity edema, this will worsen the intensity of the symptoms, not prove the ultimate cause.  We will follow-up once this testing is done, we will call her earlier if the results indicate something unusual which may warrant a different diagnostic approach.  Further blood testing, etc. will likely be required at that time.    - Referral to Neurodiagnostics (EEG,EP,EMG/NCS/DBS)    Time: 50 minutes in total spent on patient care including precharting, record review, discussions with healthcare staff and documentation.  This included face-to-face time with the patient for exam, review, education, counseling, and coordinating care.

## 2021-11-29 DIAGNOSIS — M79.2 NEUROPATHIC PAIN: ICD-10-CM

## 2021-11-29 RX ORDER — GABAPENTIN 300 MG/1
300 CAPSULE ORAL 3 TIMES DAILY
Qty: 270 CAPSULE | Refills: 2 | Status: SHIPPED | OUTPATIENT
Start: 2021-11-29 | End: 2022-01-12 | Stop reason: SDUPTHER

## 2021-11-30 ENCOUNTER — TELEPHONE (OUTPATIENT)
Dept: MEDICAL GROUP | Facility: LAB | Age: 76
End: 2021-11-30

## 2021-12-01 NOTE — TELEPHONE ENCOUNTER
Corry:  Please call LINH Barker note from Mark Carrizales has been reviewed.   Regards, Jp Crocker, DO

## 2021-12-09 ENCOUNTER — OFFICE VISIT (OUTPATIENT)
Dept: MEDICAL GROUP | Facility: LAB | Age: 76
End: 2021-12-09
Payer: MEDICARE

## 2021-12-09 VITALS
WEIGHT: 246.25 LBS | BODY MASS INDEX: 34.48 KG/M2 | TEMPERATURE: 96.9 F | SYSTOLIC BLOOD PRESSURE: 120 MMHG | OXYGEN SATURATION: 93 % | DIASTOLIC BLOOD PRESSURE: 68 MMHG | HEIGHT: 71 IN | HEART RATE: 83 BPM

## 2021-12-09 DIAGNOSIS — L72.0 INCLUSION CYST: ICD-10-CM

## 2021-12-09 DIAGNOSIS — M79.601 RIGHT ARM PAIN: ICD-10-CM

## 2021-12-09 DIAGNOSIS — M25.531 RIGHT WRIST PAIN: ICD-10-CM

## 2021-12-09 PROCEDURE — 99214 OFFICE O/P EST MOD 30 MIN: CPT | Performed by: INTERNAL MEDICINE

## 2021-12-09 ASSESSMENT — FIBROSIS 4 INDEX: FIB4 SCORE: 1.59

## 2021-12-10 NOTE — PROGRESS NOTES
CC: Lucero Palm is a 76 y.o. female is suffering from   Chief Complaint   Patient presents with   • Sore     left under arm          SUBJECTIVE:  1. Inclusion cyst  Lucero is here for follow-up is suffering from an inclusion cyst on her left arm, referral written to general surgery    2. Right wrist pain  Patient with right wrist pain after falling x-rays ordered    3. Right arm pain  Right arm pain after falling x-rays ordered        Past social, family, history:   Social History     Tobacco Use   • Smoking status: Never Smoker   • Smokeless tobacco: Never Used   Vaping Use   • Vaping Use: Never used   Substance Use Topics   • Alcohol use: Yes     Alcohol/week: 0.6 oz     Types: 1 Glasses of wine per week     Comment: 2-3 per month   • Drug use: No         MEDICATIONS:    Current Outpatient Medications:   •  gabapentin (NEURONTIN) 300 MG Cap, Take 1 Capsule by mouth 3 times a day., Disp: 270 Capsule, Rfl: 2  •  glucose blood (CONTOUR NEXT TEST) strip, test sugars, Disp: , Rfl:   •  Continuous Blood Gluc  (FREESTYLE ANGEL 14 DAY READER) Device, use reader, Disp: , Rfl:   •  Blood Glucose Monitoring Suppl (CONTOUR NEXT MONITOR) w/Device Kit, as directed, Disp: , Rfl:   •  rosuvastatin (CRESTOR) 10 MG Tab, Take 1 Tablet by mouth every evening., Disp: 90 Tablet, Rfl: 3  •  COMBIGAN 0.2-0.5 % Solution, , Disp: , Rfl:   •  Continuous Blood Gluc Sensor (FREESTYLE ANGEL 14 DAY SENSOR) [facility], CHECK BLOOD SUGAR 5 TIMES DAILY. CHANGE SENSOR EVERY 14 DAYS DX CODE E11.65, Disp: , Rfl:   •  Dulaglutide (TRULICITY) 4.5 MG/0.5ML Solution Pen-injector, 4.5mg, Disp: , Rfl:   •  acetaminophen (TYLENOL) 325 MG Tab, Take 650 mg by mouth every four hours as needed., Disp: , Rfl:     PROBLEMS:  Patient Active Problem List    Diagnosis Date Noted   • Diabetic polyneuropathy associated with type 2 diabetes mellitus (HCC) 11/17/2021   • Sciatica of right side without back pain 05/31/2018   • Dyslipidemia 11/07/2017  "  • Diabetes mellitus type 2 in obese ([facility]) 11/07/2017   • Gastroesophageal reflux disease without esophagitis 11/07/2017   • Obesity (BMI 30-39.9) 11/07/2017       REVIEW OF SYSTEMS:  Gen.:  No Nausea, Vomiting, fever, Chills.  Heart: No chest pain.  Lungs:  No shortness of Breath.  Psychological: Celso unusual Anxiety depression     PHYSICAL EXAM   Constitutional: Alert, cooperative, not in acute distress.  Cardiovascular:  Rate Rhythm is regular without murmurs rubs clicks.     Thorax & Lungs: Clear to auscultation, no wheezing, rhonchi, or rales  HENT: Normocephalic, Atraumatic.  Eyes: PERRLA, EOMI, Conjunctiva normal.   Neck: Trachia is midline no swelling of the thyroid.   Lymphatic: No lymphadenopathy noted.   Musculoskeletal: Pain to palpation right wrist also pain to palpation midshaft right forearm  Neurologic: Alert & oriented x 3, cranial nerves II through XII are intact, Normal motor function, Normal sensory function, No focal deficits noted.   Psychiatric: Affect normal, Judgment normal, Mood normal.     VITAL SIGNS:/68 (BP Location: Left arm, Patient Position: Sitting, BP Cuff Size: Adult)   Pulse 83   Temp 36.1 °C (96.9 °F) (Temporal)   Ht 1.791 m (5' 10.5\")   Wt 112 kg (246 lb 4.1 oz)   SpO2 93%   BMI 34.83 kg/m²     Labs: Reviewed    Assessment:                                                     Plan:    1. Inclusion cyst  Inclusion cyst left axilla referral written  - Referral to General Surgery    2. Right wrist pain  X-ray forearm written also wrist  - DX-FOREARM RIGHT; Future  - DX-WRIST-COMPLETE 3+ RIGHT; Future    3. Right arm pain  As detailed above        "

## 2021-12-13 ENCOUNTER — APPOINTMENT (OUTPATIENT)
Dept: RADIOLOGY | Facility: IMAGING CENTER | Age: 76
End: 2021-12-13
Attending: INTERNAL MEDICINE
Payer: MEDICARE

## 2021-12-13 ENCOUNTER — APPOINTMENT (OUTPATIENT)
Dept: URGENT CARE | Facility: CLINIC | Age: 76
End: 2021-12-13
Payer: MEDICARE

## 2021-12-13 ENCOUNTER — TELEPHONE (OUTPATIENT)
Dept: MEDICAL GROUP | Facility: LAB | Age: 76
End: 2021-12-13

## 2021-12-13 DIAGNOSIS — E66.9 DIABETES MELLITUS TYPE 2 IN OBESE: ICD-10-CM

## 2021-12-13 DIAGNOSIS — M25.531 RIGHT WRIST PAIN: ICD-10-CM

## 2021-12-13 DIAGNOSIS — E11.69 DIABETES MELLITUS TYPE 2 IN OBESE: ICD-10-CM

## 2021-12-13 PROCEDURE — 73110 X-RAY EXAM OF WRIST: CPT | Mod: TC,RT | Performed by: PHYSICIAN ASSISTANT

## 2021-12-13 PROCEDURE — 73090 X-RAY EXAM OF FOREARM: CPT | Mod: TC,RT | Performed by: PHYSICIAN ASSISTANT

## 2021-12-16 ENCOUNTER — NON-PROVIDER VISIT (OUTPATIENT)
Dept: NEUROLOGY | Facility: MEDICAL CENTER | Age: 76
End: 2021-12-16
Attending: PSYCHIATRY & NEUROLOGY
Payer: MEDICARE

## 2021-12-16 DIAGNOSIS — E11.42 DIABETIC POLYNEUROPATHY ASSOCIATED WITH TYPE 2 DIABETES MELLITUS (HCC): ICD-10-CM

## 2021-12-16 PROCEDURE — 95912 NRV CNDJ TEST 11-12 STUDIES: CPT | Mod: 26 | Performed by: PSYCHIATRY & NEUROLOGY

## 2021-12-16 PROCEDURE — 95886 MUSC TEST DONE W/N TEST COMP: CPT | Mod: 26 | Performed by: PSYCHIATRY & NEUROLOGY

## 2021-12-16 PROCEDURE — 95886 MUSC TEST DONE W/N TEST COMP: CPT | Performed by: PSYCHIATRY & NEUROLOGY

## 2021-12-16 PROCEDURE — 95912 NRV CNDJ TEST 11-12 STUDIES: CPT | Performed by: PSYCHIATRY & NEUROLOGY

## 2021-12-16 NOTE — PROCEDURES
"NERVE CONDUCTION STUDIES AND ELECTROMYOGRAPHY REPORT  [facility]  12/16/21          IMPRESSION:  This is an abnormal study.  There is electrophysiologic evidence of:  1. A symmetric, length dependent, sensorimotor, axonal peripheral neuropathy.  2. Incidental moderate-severe left median neuropathy at the wrist (carpal tunnel syndrome).  3. Mild subacute on chronic denervation/reinnervation of the left vastus lateralis/medialis consistent with left L2-4 radiculopathy.  4. Low left ulnar motor response at the ADM which is otherwise nonlocalizable. If high clinical suspicion for left ulnar neuropathy, recommend ordering dedicated studies.    Recommend clinical correlation.      Lisa Blake MD  Neurology - Neurophysiology        REASON FOR REFERRAL:  Ms. Lucero Mcrae Palm 76 y.o. referred by Dr. Joel Mark for an evaluation of suspected peripheral neuropathy.  Since approximately 2019, patient began feeling abnormal sensations in the feet with slow proximal progression now below the ankle.  There is no associated weakness or back pain.  She has a history of diabetes and bilateral carpal tunnel syndrome.  Surgery was previously recommended for her carpal tunnel syndrome about 30 years prior but was not pursued.    Height: 5'11\"  Weight: 230 lbs    Symptom focused neurological exam shows reduced sensation to light touch in the bilateral lower extremities.  There is atrophy of the left abductor pollicis brevis.  Strength is otherwise intact in the left hand.      ELECTRODIAGNOSTIC EXAMINATION:  Nerve conduction studies (NCS) and electromyography (EMG) are utilized to evaluate direct or indirect damage to the peripheral nervous system. NCS are performed to measure the nerve(s) response(s) to electrostimulation across a given nerve segment. EMG evaluates the passive and active electrical activity of the muscle(s) in question.  Muscles are innervated by specific peripheral nerves and roots. " Often times, several nerves the muscle to be examined in order to determine the presence or absence of the disease process. Furthermore, nerves and muscles may need to be tested in a [id number] comparison, as well as in additional extremities, as this may be crucial in characterizing the extent of the disease process, which may be diffuse or isolated and of varying degree of severity. The extent of the neurodiagnostic exam is justified as it may help arrive to a proper diagnosis, which ultimately may contribute to better management of the patient. Therefore, the nerves to muscles examined during the study were medically necessary.    Unless otherwise noted, temperature of the extremity(s) study was monitored before and during the examination and remained between 32 and 36 degrees C for the upper extremities, and between 30 and 36 degrees C for the lower extremities. The patient tolerated testing well, without any complications.       NERVE CONDUCTION STUDY SUMMARY:  Selected nerves of the bilateral lower extremity and left upper extremity are studied.    Unobtainable left median sensory response.  Abnormal left median motor response at the abductor pollicis brevis due to prolonged distal latency and low amplitude.  Normal left ulnar sensory response.  Abnormal left ulnar motor response at the ADM due to low amplitude.  Unobtainable left tibial motor response at the abductor hallucis brevis.  Abnormal bilateral common peroneal motor responses at the extensor digitorum brevis due to low amplitude.  Unobtainable left common peroneal motor response with distal stimulation likely technical.      NEEDLE EMG SUMMARY:  Concentric needle study of selected left lower extremity muscles is performed.     Insertion activity is increased in the left vastus lateralis due to presence of fibrillation potentials and positive waves.  Large amplitude prolonged duration motor unit potentials with reduced recruitment appreciated in the  left vastus medialis/lateralis.  Otherwise with activation, there are normal morphology (amplitude/duration) motor unit action potentials firing with normal recruitment in remaining muscles tested.       PATIENT DATA TABLES  Nerve Conduction Studies     Stim Site NR Onset (ms) Norm Onset (ms) O-P Amp (µV) Norm O-P Amp Site1 Site2 Delta-P (ms) Dist (cm) Otto (m/s) Norm Otto (m/s)   Left Median Anti Sensory (2nd Digit)   Wrist *NR  <3.8  >10 Wrist 2nd Digit  14.0  >50   Left Sural Anti Sensory (Lat Mall)   Calf *NR  <4.6  >3 Calf Lat Mall  14.0  >40   Right Sural Anti Sensory (Lat Mall)   Calf *NR  <4.6  >3 Calf Lat Mall  14.0  >40   Left Ulnar Anti Sensory (5th Digit)   Wrist    2.6 <3.2 7.9 >5 Wrist 5th Digit 3.5 14.0 *40 >50        Stim Site NR Onset (ms) Norm Onset (ms) O-P Amp (mV) Norm O-P Amp Site1 Site2 Delta-0 (ms) Dist (cm) Otto (m/s) Norm Otto (m/s)   Left Median Motor (Abd Poll Brev)   Wrist    *8.0 <4 *0.7 >5 Elbow Wrist 5.4 28.5 53 >50   Elbow    13.4  0.4          Left Peroneal EDB Motor (Ext Dig Brev)   Ankle *NR  <6  >2.5 B Fib Ankle  0.0  >40   B Fib    11.9  1.1  Poplt B Fib 2.3 10.0 43    Poplt    14.2  1.0          Right Peroneal EDB Motor (Ext Dig Brev)   Ankle    3.4 <6 *0.9 >2.5 B Fib Ankle 9.6 39.0 41 >40   B Fib    13.0  0.9  Poplt B Fib 1.6 10.0 63    Poplt    14.6  0.8          Left Peroneal TA Motor (AntTibialis)   Fib Head    2.5 <4.5 4.7 3 Poplit Fib Head 1.3 10.0 77 >40   Poplit    3.8  4.7          Right Peroneal TA Motor (AntTibialis)   Fib Head    2.2 <4.5 3.2 >3 Poplit Fib Head 1.2 10.0 83 >40   Poplit    3.4  3.0          Right Tibial Motor (Abd Negron Brev)   Ankle *NR  <6  >4 Knee Ankle  0.0  >40   Knee *NR             Left Ulnar Motor (Abd Dig Min)   Wrist    2.7 <3.1 *6.1 >7 B Elbow Wrist 3.8 19.0 50 >50   B Elbow    6.5  5.3  A Elbow B Elbow 1.5 10.0 67    A Elbow    8.0  5.5                                       Electromyography     Side Muscle Nerve Root Ins Act Fibs Psw Amp Dur  Poly Recrt Int Pat Comment   Left AntTibialis Dp Br Fibular L4-5 Nml Nml Nml Nml Nml 0 Nml Nml    Left Gastroc Tibial S1-2 Nml Nml Nml Nml Nml 0 Nml Nml    Left VastusLat Femoral L2-4 *Incr *1+ *1+ *Incr *>12ms 0 *Reduced *75%    Left ExtHallLong Dp Br Fibular L5, S1 Nml Nml Nml Nml Nml 0 Nml Nml    Left VastusMed Femoral L2-4 Nml Nml Nml *Incr *>12ms 0 Nml Nml

## 2021-12-20 NOTE — PROGRESS NOTES
CC: Lucero Palm is a 76 y.o. female is suffering from   Chief Complaint   Patient presents with   • Fall     bilateral knee pain that is worse on the right, left shoulder pain         SUBJECTIVE:  1. Acute pain of right knee  Lucero is here for follow-up continues to struggle with right knee pain after falling. X-rays ordered    2. Acute pain of left shoulder  Patient with a fall injuring also left shoulder. Loss of range of motion x-rays ordered    3. Neuropathic pain  Ongoing neuropathic pain lower extremities referral written to neurology    4. Obesity (BMI 30-39.9)  Continued obesity encourage patient to work on weight loss    5. Diabetes mellitus type 2 in obese (HCC)  Labs ordered    6. Cough  Etiology uncertain    7. Other skin changes  Referral written to dermatology        Past social, family, history:   Social History     Tobacco Use   • Smoking status: Never Smoker   • Smokeless tobacco: Never Used   Substance Use Topics   • Alcohol use: Yes     Alcohol/week: 0.6 oz     Types: 1 Glasses of wine per week     Comment: 2-3 per month   • Drug use: No         MEDICATIONS:    Current Outpatient Medications:   •  fluticasone (FLONASE) 50 MCG/ACT nasal spray, , Disp: , Rfl:   •  rosuvastatin (CRESTOR) 10 MG Tab, Take 1 Tablet by mouth every evening., Disp: 90 Tablet, Rfl: 3  •  gabapentin (NEURONTIN) 300 MG Cap, Take 1 Capsule by mouth 3 times a day., Disp: 90 Capsule, Rfl: 3  •  HYDROcodone-acetaminophen (NORCO) 5-325 MG Tab per tablet, Take 1 Tablet by mouth every four hours as needed for up to 5 days., Disp: 20 Tablet, Rfl: 0  •  THIAMINE HCL PO, Take  by mouth., Disp: , Rfl:   •  fexofenadine (ALLEGRA ALLERGY) 180 MG tablet, Take 180 mg by mouth every day., Disp: , Rfl:   •  COMBIGAN 0.2-0.5 % Solution, , Disp: , Rfl:   •  Dulaglutide (TRULICITY) 4.5 MG/0.5ML Solution Pen-injector, 4.5mg, Disp: , Rfl:   •  acetaminophen (TYLENOL) 325 MG Tab, Take 650 mg by mouth every four hours as needed.,  "Disp: , Rfl:   •  Continuous Blood Gluc Sensor (FREESTYLE ANGEL 14 DAY SENSOR) [facility], CHECK BLOOD SUGAR 5 TIMES DAILY. CHANGE SENSOR EVERY 14 DAYS DX CODE E11.65, Disp: , Rfl:     PROBLEMS:  Patient Active Problem List    Diagnosis Date Noted   • Sciatica of right side without back pain 05/31/2018   • Dyslipidemia 11/07/2017   • Diabetes mellitus type 2 in obese (HCC) 11/07/2017   • Gastroesophageal reflux disease without esophagitis 11/07/2017   • Obesity (BMI 30-39.9) 11/07/2017       REVIEW OF SYSTEMS:  Gen.:  No Nausea, Vomiting, fever, Chills.  Heart: No chest pain.  Lungs:  No shortness of Breath.  Psychological: Celso unusual Anxiety depression     PHYSICAL EXAM   Constitutional: Alert, cooperative, not in acute distress.  Cardiovascular:  Rate Rhythm is regular without murmurs rubs clicks.     Thorax & Lungs: Clear to auscultation, no wheezing, rhonchi, or rales  HENT: Normocephalic, Atraumatic.  Eyes: PERRLA, EOMI, Conjunctiva normal.   Neck: Trachia is midline no swelling of the thyroid.   Lymphatic: No lymphadenopathy noted.   Neurologic: Alert & oriented x 3, cranial nerves II through XII are intact, Normal motor function, Normal sensory function, No focal deficits noted.   Psychiatric: Affect normal, Judgment normal, Mood normal.     VITAL SIGNS:/64   Pulse 88   Temp (!) 35.8 °C (96.4 °F) (Temporal)   Resp 16   Ht 1.791 m (5' 10.5\")   Wt 110 kg (243 lb 6.4 oz)   SpO2 94%   BMI 34.43 kg/m²     Labs: Reviewed    Assessment:                                                     Plan:    1. Acute pain of right knee  Patient with diffuse pain associate with the right knee x-rays ordered  - DX-KNEE 3 VIEWS RIGHT  - HYDROcodone-acetaminophen (NORCO) 5-325 MG Tab per tablet; Take 1 Tablet by mouth every four hours as needed for up to 5 days.  Dispense: 20 Tablet; Refill: 0    2. Acute pain of left shoulder  Patient with loss of internal and external rotation pain at the anterior shoulder, negative " adduction  - DX-SHOULDER 2+ LEFT; Future  - HYDROcodone-acetaminophen (NORCO) 5-325 MG Tab per tablet; Take 1 Tablet by mouth every four hours as needed for up to 5 days.  Dispense: 20 Tablet; Refill: 0    3. Neuropathic pain  Start Neurontin  - gabapentin (NEURONTIN) 300 MG Cap; Take 1 Capsule by mouth 3 times a day.  Dispense: 90 Capsule; Refill: 3  - REFERRAL TO NEUROLOGY    4. Obesity (BMI 30-39.9)  Discussed with the patient diet exercise    5. Diabetes mellitus type 2 in obese (HCC)  No evidence of diabetic ulceration of her feet questionable neuropathy left foot  - Diabetic Monofilament Lower Extremity Exam    6. Cough  Chest x-ray ordered  - DX-CHEST-2 VIEWS; Future    7. Other skin changes  Referral written to dermatology  - REFERRAL TO DERMATOLOGY         DISPLAY PLAN FREE TEXT DISPLAY PLAN FREE TEXT DISPLAY PLAN FREE TEXT DISPLAY PLAN FREE TEXT

## 2022-01-12 ENCOUNTER — OFFICE VISIT (OUTPATIENT)
Dept: NEUROLOGY | Facility: MEDICAL CENTER | Age: 77
End: 2022-01-12
Attending: PSYCHIATRY & NEUROLOGY
Payer: MEDICARE

## 2022-01-12 ENCOUNTER — APPOINTMENT (OUTPATIENT)
Dept: MEDICAL GROUP | Facility: LAB | Age: 77
End: 2022-01-12
Payer: MEDICARE

## 2022-01-12 VITALS
HEART RATE: 91 BPM | OXYGEN SATURATION: 93 % | SYSTOLIC BLOOD PRESSURE: 148 MMHG | TEMPERATURE: 97.3 F | DIASTOLIC BLOOD PRESSURE: 62 MMHG | WEIGHT: 245.37 LBS | BODY MASS INDEX: 35.13 KG/M2 | HEIGHT: 70 IN | RESPIRATION RATE: 18 BRPM

## 2022-01-12 DIAGNOSIS — G56.02 CARPAL TUNNEL SYNDROME OF LEFT WRIST: ICD-10-CM

## 2022-01-12 DIAGNOSIS — E66.9 DIABETES MELLITUS TYPE 2 IN OBESE: ICD-10-CM

## 2022-01-12 DIAGNOSIS — E11.69 DIABETES MELLITUS TYPE 2 IN OBESE: ICD-10-CM

## 2022-01-12 DIAGNOSIS — E11.42 DIABETIC POLYNEUROPATHY ASSOCIATED WITH TYPE 2 DIABETES MELLITUS (HCC): Primary | ICD-10-CM

## 2022-01-12 PROCEDURE — 99212 OFFICE O/P EST SF 10 MIN: CPT | Performed by: PSYCHIATRY & NEUROLOGY

## 2022-01-12 PROCEDURE — 99214 OFFICE O/P EST MOD 30 MIN: CPT | Performed by: PSYCHIATRY & NEUROLOGY

## 2022-01-12 RX ORDER — GABAPENTIN 300 MG/1
600 CAPSULE ORAL 2 TIMES DAILY
Qty: 120 CAPSULE | Refills: 5 | Status: SHIPPED | OUTPATIENT
Start: 2022-01-12 | End: 2022-03-24 | Stop reason: SDUPTHER

## 2022-01-12 RX ORDER — DULAGLUTIDE 4.5 MG/.5ML
4.5 INJECTION, SOLUTION SUBCUTANEOUS
COMMUNITY
Start: 2021-11-16 | End: 2022-11-07

## 2022-01-12 RX ORDER — GABAPENTIN 300 MG/1
1 CAPSULE ORAL 3 TIMES DAILY
COMMUNITY
Start: 2021-11-02 | End: 2022-06-24

## 2022-01-12 ASSESSMENT — ENCOUNTER SYMPTOMS
FOCAL WEAKNESS: 0
TREMORS: 0
TINGLING: 1
SENSORY CHANGE: 1

## 2022-01-12 ASSESSMENT — FIBROSIS 4 INDEX: FIB4 SCORE: 1.59

## 2022-01-12 ASSESSMENT — PATIENT HEALTH QUESTIONNAIRE - PHQ9: CLINICAL INTERPRETATION OF PHQ2 SCORE: 0

## 2022-01-12 NOTE — PROGRESS NOTES
"Subjective     Lucero Palm is a 76 y.o. female who presents for follow-up, with a history of a peripheral polyneuropathy, but whose EMG/NCV studies related an incidental left moderate severity CTS.    HPI    Since last seen, Lucero states that the sensory symptoms she has in the lower extremities actually have responded to gabapentin, we had not increase the dose further because of a difficulty with sleepiness.  In the lower extremities the symptoms are pretty much distal to the ankles, discomfort can radiate proximally, mostly in his numbness in the feet.  They are most problematic at night when she gets into bed.  Though there is no weakness with the lower extremities, she is unsteady as she walks.  The stiffness in the hands, left more than right, are still worsened in the mornings.  This is not worsened.  She denies any sensory loss in any of the extremities.    EMG/NCV studies did reveal a moderate to severe length dependent axonal sensory and motor polyneuropathy.  There was also an incidental compression of the left median nerve at the wrist of moderate to severe nature, and a chronic left lower extremity L2-4 radiculopathy.    In regards to the gabapentin, she actually states that the drug does not make her sleepy when taken throughout the day, but the nighttime dose is critical so that she can fall asleep and stay there.    Medical, surgical and family histories are reviewed, there are no new drug allergies.  On Trulicity, she has been noncompliant with her other diabetes medications.  She is on Neurontin 300 mg, 3 times daily, and Crestor.    Review of Systems   Neurological: Positive for tingling and sensory change. Negative for tremors and focal weakness.   All other systems reviewed and are negative.    Objective     /62 (BP Location: Right arm, Patient Position: Sitting, BP Cuff Size: Adult)   Pulse 91   Temp 36.3 °C (97.3 °F) (Temporal)   Resp 18   Ht 1.778 m (5' 10\")   Wt 111 kg " (245 lb 6 oz)   SpO2 93%   BMI 35.21 kg/m²      Physical Exam    She appears in no acute distress.  Vital signs are stable.  There is no malar rash.  Her neck is supple.  Cardiac evaluation is unremarkable.  There is still some mild bilateral pretibial edema.  There is some tenderness of the left wrist on palpation, no tenderness of the elbow.  Compression maneuvers at the wrist are negative.     Neurological Exam    In quick and cursory fashion, her neurologic exam is for the most part unchanged.  In the hands there is no clear atrophy of the thenar or hypothenar eminence on either side, intrinsic musculatures are intact in strength.  Reflex exam is the same.  Repetitive movements with the hands are also symmetric and intact.    Sensory exam remains unchanged with loss of both large and small fiber functions in the lower extremities only.    Assessment & Plan      1. Diabetic polyneuropathy associated with type 2 diabetes mellitus (HCC)  In all likelihood this is a diabetic process, the abnormalities on EMG/NCV are consistent.  She does admit to noncompliance with her hypoglycemic regimen.  She was given the name of her former diabetologist to follow through with.  We can also increase the gabapentin further, to a 600 mg twice daily regimen.  She was reassured she is not on a very high dose, and her renal profiles have always been normal, allowing some room to move further.    I do not think a skin biopsy really is necessary.  She was told the condition will remain as a lifelong issue for her, the question being how quickly and severely it can progress.    - gabapentin (NEURONTIN) 300 MG Cap; Take 2 Capsules by mouth 2 times a day.  Dispense: 120 Capsule; Refill: 5    2. Carpal tunnel syndrome of left wrist  An incidental finding, but not unimportant.  The severity is significant, minimally symptomatic due to the peripheral neuropathy that she has which will suppress subjective dysesthesias.  She needs to be  forwarded to a hand specialist, she was sent to [facility] orthopedics with Dr. Lalo Singleton MD.  She and I will follow-up in several months, we will communicate via [employer] in the interim.    - Referral to Physical Therapy    Time: Total of 20 minutes was spent on patient care including precharting, record review, discussions with healthcare staff and documentation.  This included face-to-face time with the patient for exam, review, as well as discussion, education, counseling and coordinating care.

## 2022-01-18 ENCOUNTER — TELEPHONE (OUTPATIENT)
Dept: NEUROLOGY | Facility: MEDICAL CENTER | Age: 77
End: 2022-01-18

## 2022-01-18 NOTE — TELEPHONE ENCOUNTER
Patient called wanted a separate order for a whole body PT and a different order for her right hand carpal tunnel. Original order says left hand carpal tunnel. Need to fax the order to [facility]. Thank you.KIET Ng.

## 2022-02-24 ENCOUNTER — OFFICE VISIT (OUTPATIENT)
Dept: MEDICAL GROUP | Facility: LAB | Age: 77
End: 2022-02-24
Payer: MEDICARE

## 2022-02-24 VITALS
HEIGHT: 70 IN | SYSTOLIC BLOOD PRESSURE: 120 MMHG | HEART RATE: 82 BPM | DIASTOLIC BLOOD PRESSURE: 68 MMHG | OXYGEN SATURATION: 96 % | TEMPERATURE: 97.3 F | RESPIRATION RATE: 16 BRPM | BODY MASS INDEX: 34.65 KG/M2 | WEIGHT: 242 LBS

## 2022-02-24 DIAGNOSIS — M79.641 PAIN IN BOTH HANDS: ICD-10-CM

## 2022-02-24 DIAGNOSIS — M79.642 PAIN IN BOTH HANDS: ICD-10-CM

## 2022-02-24 PROCEDURE — 99214 OFFICE O/P EST MOD 30 MIN: CPT | Performed by: FAMILY MEDICINE

## 2022-02-24 RX ORDER — MELOXICAM 7.5 MG/1
7.5 TABLET ORAL DAILY
Qty: 30 TABLET | Refills: 0 | Status: SHIPPED | OUTPATIENT
Start: 2022-02-24 | End: 2022-03-24 | Stop reason: SDUPTHER

## 2022-02-24 ASSESSMENT — FIBROSIS 4 INDEX: FIB4 SCORE: 1.59

## 2022-02-24 NOTE — PROGRESS NOTES
Chief Complaint:   Chief Complaint   Patient presents with   • Hand Pain     Bilateral - requesting referral       HPI: Established patient, new to me  Lucero Palm is a 76 y.o. female who presents for evaluation of bilateral hands pain     Pain in both hands    New concern    Patient reports since 2021 she has been experiencing pain in both hands associated with stiffness and sometimes swelling in the fingers, she relates it to falling on her right side and she was seen at the hospital x-rays were done and she was told she has arthritis, ever since she has been experiencing pains and aches in her hands, sometimes at night she said she is unable to even lift her pillow because of the pain and stiffness.  No recent injury or fall.  No history of arthritis            Past medical history, family history, social history and medications reviewed and updated in the record.  Today  Current medications, problem list and allergies reviewed in EPIC today  Health maintenance topics are reviewed and updated.  Today    Patient Active Problem List    Diagnosis Date Noted   • Carpal tunnel syndrome of left wrist 2022   • Diabetic polyneuropathy associated with type 2 diabetes mellitus (HCC) 2021   • Sciatica of right side without back pain 2018   • Dyslipidemia 2017   • Diabetes mellitus type 2 in obese (HCC) 2017   • Gastroesophageal reflux disease without esophagitis 2017   • Obesity (BMI 30-39.9) 2017     Family History   Problem Relation Age of Onset   • Breast Cancer Mother 42   • Cancer Mother          at 42 of breast CA   • Breast Cancer Sister    • Heart Disease Neg Hx      Social History     Socioeconomic History   • Marital status:      Spouse name: Not on file   • Number of children: Not on file   • Years of education: Not on file   • Highest education level: Not on file   Occupational History   • Not on file   Tobacco Use   • Smoking status: Never  "Smoker   • Smokeless tobacco: Never Used   Vaping Use   • Vaping Use: Never used   Substance and Sexual Activity   • Alcohol use: Yes     Alcohol/week: 0.6 oz     Types: 1 Glasses of wine per week     Comment: 2-3 per month   • Drug use: No   • Sexual activity: Not Currently     Partners: Male   Other Topics Concern   • Not on file   Social History Narrative   • Not on file     Social Determinants of Health     Financial Resource Strain: Not on file   Food Insecurity: Not on file   Transportation Needs: Not on file   Physical Activity: Not on file   Stress: Not on file   Social Connections: Not on file   Intimate Partner Violence: Not on file   Housing Stability: Not on file     Current Outpatient Medications   Medication Sig Dispense Refill   • meloxicam (MOBIC) 7.5 MG Tab Take 1 Tablet by mouth every day. 30 Tablet 0   • gabapentin (NEURONTIN) 300 MG Cap Take 1 Capsule by mouth 3 times a day.     • Dulaglutide (TRULICITY) 4.5 MG/0.5ML Solution Pen-injector Inject 4.5 mL under the skin.     • gabapentin (NEURONTIN) 300 MG Cap Take 2 Capsules by mouth 2 times a day. 120 Capsule 5   • glucose blood (CONTOUR NEXT TEST) strip test sugars     • rosuvastatin (CRESTOR) 10 MG Tab Take 1 Tablet by mouth every evening. 90 Tablet 3   • COMBIGAN 0.2-0.5 % Solution      • Dulaglutide (TRULICITY) 4.5 MG/0.5ML Solution Pen-injector 4.5mg     • acetaminophen (TYLENOL) 325 MG Tab Take 650 mg by mouth every four hours as needed.       No current facility-administered medications for this visit.           Review Of Systems  As documented in HPI above  PHYSICAL EXAMINATION:    /68 (BP Location: Right arm, Patient Position: Sitting, BP Cuff Size: Adult)   Pulse 82   Temp 36.3 °C (97.3 °F) (Temporal)   Resp 16   Ht 1.778 m (5' 10\")   Wt 110 kg (242 lb)   SpO2 96%   BMI 34.72 kg/m²   Gen.: Well-developed, well-nourished, no apparent distress, pleasant and cooperative with the examination  HEENT: Normocephalic/atraumatic, " sinuses nontender with palpation, TMs clear, nares patent with pink mucosa and clear rhinorrhea, oropharynx clear  Neck: No JVD or bruits, no adenopathy  Cor: Regular rate and rhythm without murmur gallop or rub  Lungs: Clear to auscultation with equal breath sounds bilaterally. No wheezes, rhonchi.  Abdomen: Soft nontender without hepatosplenomegaly or masses appreciated, normoactive bowel sounds  Extremities: No cyanosis, clubbing or edema  Leobardo: B/l exam, both hands with deformity very suggestive of possible osteoarthritis        ASSESSMENT/Plan:    1. Pain in both hands      Chronic problem, new to me.  Discussed with the patient unlikely systemic arthritis related to rheumatoid arthritis, but very likely it is related to osteoarthritis, advised to do labs and x-ray, start Mobic, and follow-up with PCP.  Discussed with the patient to avoid the use of Mobic for long-term to prevent side effects.  Voices understanding    DX-JOINT SURVEY-HANDS SINGLE VIEW    ANTI-NUCLEAR ANTIBODY SERUM    Sed Rate    RHEUMATOID ARTHRITIS FACTOR    meloxicam (MOBIC) 7.5 MG Tab       Please note that this dictation was created using voice recognition software. I have made every reasonable attempt to correct obvious errors but there may be errors of grammar and content that I may have overlooked prior to finalization of this note.

## 2022-03-01 ENCOUNTER — APPOINTMENT (OUTPATIENT)
Dept: RADIOLOGY | Facility: IMAGING CENTER | Age: 77
End: 2022-03-01
Attending: FAMILY MEDICINE
Payer: MEDICARE

## 2022-03-01 ENCOUNTER — APPOINTMENT (OUTPATIENT)
Dept: URGENT CARE | Facility: CLINIC | Age: 77
End: 2022-03-01
Payer: MEDICARE

## 2022-03-01 DIAGNOSIS — M79.641 PAIN IN BOTH HANDS: ICD-10-CM

## 2022-03-01 DIAGNOSIS — M79.642 PAIN IN BOTH HANDS: ICD-10-CM

## 2022-03-01 PROCEDURE — 77077 JOINT SURVEY SINGLE VIEW: CPT | Mod: TC | Performed by: FAMILY MEDICINE

## 2022-03-03 ENCOUNTER — HOSPITAL ENCOUNTER (OUTPATIENT)
Dept: LAB | Facility: MEDICAL CENTER | Age: 77
End: 2022-03-03
Attending: FAMILY MEDICINE
Payer: MEDICARE

## 2022-03-03 DIAGNOSIS — M79.641 PAIN IN BOTH HANDS: ICD-10-CM

## 2022-03-03 DIAGNOSIS — M79.642 PAIN IN BOTH HANDS: ICD-10-CM

## 2022-03-03 LAB
ERYTHROCYTE [SEDIMENTATION RATE] IN BLOOD BY WESTERGREN METHOD: 6 MM/HOUR (ref 0–25)
RHEUMATOID FACT SER IA-ACNC: <10 IU/ML (ref 0–14)

## 2022-03-03 PROCEDURE — 85652 RBC SED RATE AUTOMATED: CPT

## 2022-03-03 PROCEDURE — 86431 RHEUMATOID FACTOR QUANT: CPT

## 2022-03-03 PROCEDURE — 86038 ANTINUCLEAR ANTIBODIES: CPT

## 2022-03-03 PROCEDURE — 36415 COLL VENOUS BLD VENIPUNCTURE: CPT

## 2022-03-05 LAB — NUCLEAR IGG SER QL IA: NORMAL

## 2022-03-24 ENCOUNTER — OFFICE VISIT (OUTPATIENT)
Dept: MEDICAL GROUP | Facility: LAB | Age: 77
End: 2022-03-24
Payer: MEDICARE

## 2022-03-24 ENCOUNTER — HOSPITAL ENCOUNTER (OUTPATIENT)
Dept: LAB | Facility: MEDICAL CENTER | Age: 77
End: 2022-03-24
Attending: INTERNAL MEDICINE
Payer: MEDICARE

## 2022-03-24 VITALS
BODY MASS INDEX: 34.94 KG/M2 | TEMPERATURE: 97.6 F | WEIGHT: 244.05 LBS | HEIGHT: 70 IN | DIASTOLIC BLOOD PRESSURE: 65 MMHG | HEART RATE: 90 BPM | SYSTOLIC BLOOD PRESSURE: 120 MMHG | OXYGEN SATURATION: 95 %

## 2022-03-24 DIAGNOSIS — E11.69 DIABETES MELLITUS TYPE 2 IN OBESE: ICD-10-CM

## 2022-03-24 DIAGNOSIS — E11.42 DIABETIC POLYNEUROPATHY ASSOCIATED WITH TYPE 2 DIABETES MELLITUS (HCC): ICD-10-CM

## 2022-03-24 DIAGNOSIS — E78.5 DYSLIPIDEMIA: ICD-10-CM

## 2022-03-24 DIAGNOSIS — Z12.11 SCREEN FOR COLON CANCER: ICD-10-CM

## 2022-03-24 DIAGNOSIS — E66.9 DIABETES MELLITUS TYPE 2 IN OBESE: ICD-10-CM

## 2022-03-24 DIAGNOSIS — M79.641 PAIN IN BOTH HANDS: ICD-10-CM

## 2022-03-24 DIAGNOSIS — M79.642 PAIN IN BOTH HANDS: ICD-10-CM

## 2022-03-24 LAB
EST. AVERAGE GLUCOSE BLD GHB EST-MCNC: 166 MG/DL
HBA1C MFR BLD: 7.4 % (ref 4–5.6)

## 2022-03-24 PROCEDURE — 36415 COLL VENOUS BLD VENIPUNCTURE: CPT | Mod: GA

## 2022-03-24 PROCEDURE — 99214 OFFICE O/P EST MOD 30 MIN: CPT | Performed by: INTERNAL MEDICINE

## 2022-03-24 PROCEDURE — 83036 HEMOGLOBIN GLYCOSYLATED A1C: CPT | Mod: GA

## 2022-03-24 RX ORDER — ROSUVASTATIN CALCIUM 10 MG/1
10 TABLET, COATED ORAL EVERY EVENING
Qty: 90 TABLET | Refills: 3 | Status: SHIPPED | OUTPATIENT
Start: 2022-03-24 | End: 2023-07-19

## 2022-03-24 RX ORDER — GABAPENTIN 300 MG/1
600 CAPSULE ORAL 2 TIMES DAILY
Qty: 120 CAPSULE | Refills: 5 | Status: SHIPPED
Start: 2022-03-24 | End: 2022-06-24

## 2022-03-24 RX ORDER — MELOXICAM 7.5 MG/1
7.5 TABLET ORAL DAILY
Qty: 30 TABLET | Refills: 2 | Status: SHIPPED | OUTPATIENT
Start: 2022-03-24 | End: 2023-07-19

## 2022-03-24 ASSESSMENT — FIBROSIS 4 INDEX: FIB4 SCORE: 1.59

## 2022-03-24 NOTE — PROGRESS NOTES
CC: Lucero Palm is a 76 y.o. female is suffering from   Chief Complaint   Patient presents with   • Follow-Up     Saw Michael about arthritis in hands   • Medication Refill   • Referral Needed     Physical therapy right hand         SUBJECTIVE:  1. Screen for colon cancer  Lucero is here for follow-up is in need of screening for colon cancer FIT testing ordered    2. Pain in both hands  Patient has bilateral hand pain, is to continue on meloxicam    3. Diabetic polyneuropathy associated with type 2 diabetes mellitus (HCC)  Treated with Neurontin    4. Diabetes mellitus type 2 in obese (HCC)  Recheck hemoglobin A1c        Past social, family, history:   Social History     Tobacco Use   • Smoking status: Never Smoker   • Smokeless tobacco: Never Used   Vaping Use   • Vaping Use: Never used   Substance Use Topics   • Alcohol use: Yes     Alcohol/week: 0.6 oz     Types: 1 Glasses of wine per week     Comment: 2-3 per month   • Drug use: No         MEDICATIONS:    Current Outpatient Medications:   •  meloxicam (MOBIC) 7.5 MG Tab, Take 1 Tablet by mouth every day., Disp: 30 Tablet, Rfl: 2  •  gabapentin (NEURONTIN) 300 MG Cap, Take 2 Capsules by mouth 2 times a day., Disp: 120 Capsule, Rfl: 5  •  gabapentin (NEURONTIN) 300 MG Cap, Take 1 Capsule by mouth 3 times a day., Disp: , Rfl:   •  Dulaglutide (TRULICITY) 4.5 MG/0.5ML Solution Pen-injector, Inject 4.5 mL under the skin., Disp: , Rfl:   •  glucose blood (CONTOUR NEXT TEST) strip, test sugars, Disp: , Rfl:   •  COMBIGAN 0.2-0.5 % Solution, , Disp: , Rfl:   •  Dulaglutide (TRULICITY) 4.5 MG/0.5ML Solution Pen-injector, 4.5mg, Disp: , Rfl:   •  acetaminophen (TYLENOL) 325 MG Tab, Take 650 mg by mouth every four hours as needed., Disp: , Rfl:   •  rosuvastatin (CRESTOR) 10 MG Tab, Take 1 Tablet by mouth every evening., Disp: 90 Tablet, Rfl: 3    PROBLEMS:  Patient Active Problem List    Diagnosis Date Noted   • Carpal tunnel syndrome of left wrist  "01/12/2022   • Diabetic polyneuropathy associated with type 2 diabetes mellitus (HCC) 11/17/2021   • Sciatica of right side without back pain 05/31/2018   • Dyslipidemia 11/07/2017   • Diabetes mellitus type 2 in obese (HCC) 11/07/2017   • Gastroesophageal reflux disease without esophagitis 11/07/2017   • Obesity (BMI 30-39.9) 11/07/2017       REVIEW OF SYSTEMS:  Gen.:  No Nausea, Vomiting, fever, Chills.  Heart: No chest pain.  Lungs:  No shortness of Breath.  Psychological: Celso unusual Anxiety depression     PHYSICAL EXAM   Constitutional: Alert, cooperative, not in acute distress.  Cardiovascular:  Rate Rhythm is regular without murmurs rubs clicks.     Thorax & Lungs: Clear to auscultation, no wheezing, rhonchi, or rales  HENT: Normocephalic, Atraumatic.  Eyes: PERRLA, EOMI, Conjunctiva normal.   Neck: Trachia is midline no swelling of the thyroid.   Lymphatic: No lymphadenopathy noted.   Neurologic: Alert & oriented x 3, cranial nerves II through XII are intact, Normal motor function, Normal sensory function, No focal deficits noted.   Psychiatric: Affect normal, Judgment normal, Mood normal.     VITAL SIGNS:/65   Pulse 90   Temp 36.4 °C (97.6 °F) (Temporal)   Ht 1.778 m (5' 10\")   Wt 111 kg (244 lb 0.8 oz)   SpO2 95%   BMI 35.02 kg/m²     Labs: Reviewed    Assessment:                                                     Plan:    1. Screen for colon cancer  Cologuard test ordered  - COLOGUARD (FIT DNA)    2. Pain in both hands  Continue low-dose meloxicam  - meloxicam (MOBIC) 7.5 MG Tab; Take 1 Tablet by mouth every day.  Dispense: 30 Tablet; Refill: 2    3. Diabetic polyneuropathy associated with type 2 diabetes mellitus (HCC)  Continue gabapentin  - gabapentin (NEURONTIN) 300 MG Cap; Take 2 Capsules by mouth 2 times a day.  Dispense: 120 Capsule; Refill: 5    4. Diabetes mellitus type 2 in obese (HCC)  Hemoglobin A1c ordered  - HEMOGLOBIN A1C; Future        "

## 2022-06-24 ENCOUNTER — HOSPITAL ENCOUNTER (OUTPATIENT)
Dept: LAB | Facility: MEDICAL CENTER | Age: 77
End: 2022-06-24
Attending: INTERNAL MEDICINE
Payer: MEDICARE

## 2022-06-24 ENCOUNTER — OFFICE VISIT (OUTPATIENT)
Dept: MEDICAL GROUP | Facility: LAB | Age: 77
End: 2022-06-24
Payer: MEDICARE

## 2022-06-24 VITALS
HEART RATE: 76 BPM | BODY MASS INDEX: 35.5 KG/M2 | TEMPERATURE: 97.7 F | WEIGHT: 248 LBS | SYSTOLIC BLOOD PRESSURE: 118 MMHG | OXYGEN SATURATION: 91 % | HEIGHT: 70 IN | DIASTOLIC BLOOD PRESSURE: 64 MMHG

## 2022-06-24 DIAGNOSIS — R26.81 UNSTEADINESS ON FEET: ICD-10-CM

## 2022-06-24 DIAGNOSIS — R20.0 NUMBNESS AND TINGLING IN LEFT HAND: ICD-10-CM

## 2022-06-24 DIAGNOSIS — E11.69 DIABETES MELLITUS TYPE 2 IN OBESE: ICD-10-CM

## 2022-06-24 DIAGNOSIS — E66.9 OBESITY (BMI 35.0-39.9 WITHOUT COMORBIDITY): ICD-10-CM

## 2022-06-24 DIAGNOSIS — R20.2 NUMBNESS AND TINGLING IN LEFT HAND: ICD-10-CM

## 2022-06-24 DIAGNOSIS — E66.9 DIABETES MELLITUS TYPE 2 IN OBESE: ICD-10-CM

## 2022-06-24 DIAGNOSIS — Z12.31 ENCOUNTER FOR SCREENING MAMMOGRAM FOR MALIGNANT NEOPLASM OF BREAST: ICD-10-CM

## 2022-06-24 LAB
ALBUMIN SERPL BCP-MCNC: 4.1 G/DL (ref 3.2–4.9)
ALBUMIN/GLOB SERPL: 1.5 G/DL
ALP SERPL-CCNC: 70 U/L (ref 30–99)
ALT SERPL-CCNC: 23 U/L (ref 2–50)
ANION GAP SERPL CALC-SCNC: 12 MMOL/L (ref 7–16)
AST SERPL-CCNC: 25 U/L (ref 12–45)
BILIRUB SERPL-MCNC: 0.5 MG/DL (ref 0.1–1.5)
BUN SERPL-MCNC: 18 MG/DL (ref 8–22)
CALCIUM SERPL-MCNC: 9.1 MG/DL (ref 8.5–10.5)
CHLORIDE SERPL-SCNC: 103 MMOL/L (ref 96–112)
CHOLEST SERPL-MCNC: 195 MG/DL (ref 100–199)
CO2 SERPL-SCNC: 23 MMOL/L (ref 20–33)
CREAT SERPL-MCNC: 0.56 MG/DL (ref 0.5–1.4)
EST. AVERAGE GLUCOSE BLD GHB EST-MCNC: 166 MG/DL
FASTING STATUS PATIENT QL REPORTED: NORMAL
GFR SERPLBLD CREATININE-BSD FMLA CKD-EPI: 93 ML/MIN/1.73 M 2
GLOBULIN SER CALC-MCNC: 2.8 G/DL (ref 1.9–3.5)
GLUCOSE SERPL-MCNC: 111 MG/DL (ref 65–99)
HBA1C MFR BLD: 7.4 % (ref 4–5.6)
HDLC SERPL-MCNC: 53 MG/DL
LDLC SERPL CALC-MCNC: 114 MG/DL
POTASSIUM SERPL-SCNC: 4.5 MMOL/L (ref 3.6–5.5)
PROT SERPL-MCNC: 6.9 G/DL (ref 6–8.2)
SODIUM SERPL-SCNC: 138 MMOL/L (ref 135–145)
TRIGL SERPL-MCNC: 138 MG/DL (ref 0–149)

## 2022-06-24 PROCEDURE — 83036 HEMOGLOBIN GLYCOSYLATED A1C: CPT | Mod: GA

## 2022-06-24 PROCEDURE — 80053 COMPREHEN METABOLIC PANEL: CPT

## 2022-06-24 PROCEDURE — 36415 COLL VENOUS BLD VENIPUNCTURE: CPT

## 2022-06-24 PROCEDURE — 80061 LIPID PANEL: CPT

## 2022-06-24 PROCEDURE — 99214 OFFICE O/P EST MOD 30 MIN: CPT | Performed by: INTERNAL MEDICINE

## 2022-06-24 RX ORDER — DOXYCYCLINE HYCLATE 100 MG
TABLET ORAL
COMMUNITY
Start: 2022-04-17 | End: 2022-11-07

## 2022-06-24 RX ORDER — ALBUTEROL SULFATE 90 UG/1
1-2 AEROSOL, METERED RESPIRATORY (INHALATION)
COMMUNITY
Start: 2022-04-17 | End: 2022-11-07

## 2022-06-24 RX ORDER — ALBUTEROL SULFATE 90 UG/1
AEROSOL, METERED RESPIRATORY (INHALATION)
COMMUNITY
Start: 2022-04-17 | End: 2022-11-15

## 2022-06-24 RX ORDER — PREGABALIN 25 MG/1
25 CAPSULE ORAL 3 TIMES DAILY
Qty: 90 CAPSULE | Refills: 2 | Status: SHIPPED | OUTPATIENT
Start: 2022-06-24 | End: 2022-09-16 | Stop reason: SDUPTHER

## 2022-06-24 ASSESSMENT — FIBROSIS 4 INDEX: FIB4 SCORE: 1.59

## 2022-06-24 NOTE — PROGRESS NOTES
CC: Lucero Palm is a 76 y.o. female is suffering from   Chief Complaint   Patient presents with   • Patient Question     Follow up          SUBJECTIVE:  1. Numbness and tingling in left hand  Patient is here for follow-up and is having problems with numbness tingling associate with left hand, will have patient follow-up with Dr. Mendez orthopedics    2. Diabetes mellitus type 2 in obese (HCC)  Type 2 diabetes clinically stable    3. Encounter for screening mammogram for malignant neoplasm of breast  Screening mammogram ordered    4. Unsteadiness on feet  Referral written physical therapy    5. Obesity (BMI 35.0-39.9 without comorbidity)  Referral written to nutrition services        Past social, family, history:   Social History     Tobacco Use   • Smoking status: Never Smoker   • Smokeless tobacco: Never Used   Vaping Use   • Vaping Use: Never used   Substance Use Topics   • Alcohol use: Yes     Alcohol/week: 0.6 oz     Types: 1 Glasses of wine per week     Comment: 2-3 per month   • Drug use: No         MEDICATIONS:    Current Outpatient Medications:   •  albuterol 108 (90 Base) MCG/ACT Aero Soln inhalation aerosol, Inhale 1-2 Puffs., Disp: , Rfl:   •  pregabalin (LYRICA) 25 MG Cap, Take 1 Capsule by mouth 3 times a day for 60 days., Disp: 90 Capsule, Rfl: 2  •  doxycycline (VIBRAMYCIN) 100 MG Tab, TAKE 1 TABLET BY MOUTH TWICE DAILY FOR 7 DAYS, Disp: , Rfl:   •  albuterol 108 (90 Base) MCG/ACT Aero Soln inhalation aerosol, INHALE 1 TO 2 PUFFS BY MOUTH EVERY 4 HOURS AS NEEDED FOR WHEEZING, Disp: , Rfl:   •  meloxicam (MOBIC) 7.5 MG Tab, Take 1 Tablet by mouth every day., Disp: 30 Tablet, Rfl: 2  •  rosuvastatin (CRESTOR) 10 MG Tab, Take 1 Tablet by mouth every evening., Disp: 90 Tablet, Rfl: 3  •  Dulaglutide (TRULICITY) 4.5 MG/0.5ML Solution Pen-injector, Inject 4.5 mL under the skin., Disp: , Rfl:   •  glucose blood (CONTOUR NEXT TEST) strip, test sugars, Disp: , Rfl:   •  COMBIGAN 0.2-0.5 %  "Solution, , Disp: , Rfl:   •  Dulaglutide (TRULICITY) 4.5 MG/0.5ML Solution Pen-injector, 4.5mg, Disp: , Rfl:   •  acetaminophen (TYLENOL) 325 MG Tab, Take 650 mg by mouth every four hours as needed., Disp: , Rfl:     PROBLEMS:  Patient Active Problem List    Diagnosis Date Noted   • Carpal tunnel syndrome of left wrist 01/12/2022   • Diabetic polyneuropathy associated with type 2 diabetes mellitus (HCC) 11/17/2021   • Sciatica of right side without back pain 05/31/2018   • Dyslipidemia 11/07/2017   • Diabetes mellitus type 2 in obese (HCC) 11/07/2017   • Gastroesophageal reflux disease without esophagitis 11/07/2017   • Obesity (BMI 30-39.9) 11/07/2017       REVIEW OF SYSTEMS:  Gen.:  No Nausea, Vomiting, fever, Chills.  Heart: No chest pain.  Lungs:  No shortness of Breath.  Psychological: Celso unusual Anxiety depression     PHYSICAL EXAM   Constitutional: Alert, cooperative, not in acute distress.  Cardiovascular:  Rate Rhythm is regular without murmurs rubs clicks.     Thorax & Lungs: Clear to auscultation, no wheezing, rhonchi, or rales  HENT: Normocephalic, Atraumatic.  Eyes: PERRLA, EOMI, Conjunctiva normal.   Neck: Trachia is midline no swelling of the thyroid.   Lymphatic: No lymphadenopathy noted.   Musculoskeletal: Negative Tinel's sign  Neurologic: Alert & oriented x 3, cranial nerves II through XII are intact, Normal motor function, Normal sensory function, No focal deficits noted.   Psychiatric: Affect normal, Judgment normal, Mood normal.     VITAL SIGNS:/64   Pulse 76   Temp 36.5 °C (97.7 °F) (Temporal)   Ht 1.778 m (5' 10\")   Wt 112 kg (248 lb)   SpO2 91%   BMI 35.58 kg/m²     Labs: Reviewed    Assessment:                                                     Plan:    1. Numbness and tingling in left hand  Referral written to orthopedics start Lyrica  - Referral to Orthopedics  - Comp Metabolic Panel; Future  - Hemoglobin A1c; Future  - Lipid Profile; Future  - pregabalin (LYRICA) 25 MG " Cap; Take 1 Capsule by mouth 3 times a day for 60 days.  Dispense: 90 Capsule; Refill: 2    2. Diabetes mellitus type 2 in obese (HCC)  Recheck labs  - Comp Metabolic Panel; Future  - Hemoglobin A1c; Future  - Lipid Profile; Future  - pregabalin (LYRICA) 25 MG Cap; Take 1 Capsule by mouth 3 times a day for 60 days.  Dispense: 90 Capsule; Refill: 2    3. Encounter for screening mammogram for malignant neoplasm of breast  Screening mammogram ordered  - MA-SCREENING MAMMO BILAT W/TOMOSYNTHESIS W/CAD; Future    4. Unsteadiness on feet  Referral written to physical therapy  - Referral to Physical Therapy    5. Obesity (BMI 35.0-39.9 without comorbidity)  Referral written to physical therapy  - Referral to Nutrition Services

## 2022-07-12 ENCOUNTER — TELEPHONE (OUTPATIENT)
Dept: MEDICAL GROUP | Facility: LAB | Age: 77
End: 2022-07-12
Payer: MEDICARE

## 2022-07-12 NOTE — TELEPHONE ENCOUNTER
----- Message from Kayla Han sent at 7/12/2022 12:11 PM PDT -----  Samson Aldana,    All done. Referral has been sent to the [place] location.    Thank you,  Jane  ----- Message -----  From: Katya Beauchamp, Med Ass't  Sent: 7/12/2022  11:47 AM PDT  To: Kayla Stratton!  I was hoping that you could help me with this pt.  She is asking that her physical therapy referral be re routed to the [place] location.  The fax # is [phone].  Thanks so much for your help!

## 2022-07-20 ENCOUNTER — APPOINTMENT (OUTPATIENT)
Dept: NEUROLOGY | Facility: MEDICAL CENTER | Age: 77
End: 2022-07-20
Attending: PSYCHIATRY & NEUROLOGY
Payer: MEDICARE

## 2022-09-13 ENCOUNTER — OFFICE VISIT (OUTPATIENT)
Dept: MEDICAL GROUP | Facility: LAB | Age: 77
End: 2022-09-13
Payer: MEDICARE

## 2022-09-13 VITALS
HEIGHT: 70 IN | DIASTOLIC BLOOD PRESSURE: 68 MMHG | WEIGHT: 247.8 LBS | RESPIRATION RATE: 16 BRPM | TEMPERATURE: 97 F | BODY MASS INDEX: 35.48 KG/M2 | SYSTOLIC BLOOD PRESSURE: 130 MMHG | OXYGEN SATURATION: 95 % | HEART RATE: 82 BPM

## 2022-09-13 DIAGNOSIS — R10.31 RIGHT LOWER QUADRANT PAIN: ICD-10-CM

## 2022-09-13 PROCEDURE — 99213 OFFICE O/P EST LOW 20 MIN: CPT | Performed by: INTERNAL MEDICINE

## 2022-09-13 RX ORDER — PREGABALIN 25 MG/1
CAPSULE ORAL
COMMUNITY
End: 2022-09-16

## 2022-09-13 ASSESSMENT — FIBROSIS 4 INDEX: FIB4 SCORE: 2.13

## 2022-09-13 NOTE — PROGRESS NOTES
CC: Lucero Palm is a 77 y.o. female is suffering from   Chief Complaint   Patient presents with    Pelvic Pain     Lower pelvic pain x 1 month, this comes and goes         SUBJECTIVE:  1. Right lower quadrant pain  Right lower quadrant pain Intensity 6/10, quality sharp, radiation rlq to llq, associated signs and symptoms nancy, time component at night, improved with goes away last 2 minutes, worse with nothing. About the same x 2 months.         Past social, family, history:   Social History     Tobacco Use    Smoking status: Never    Smokeless tobacco: Never   Vaping Use    Vaping Use: Never used   Substance Use Topics    Alcohol use: Yes     Alcohol/week: 0.6 oz     Types: 1 Glasses of wine per week     Comment: 2-3 per month    Drug use: No         MEDICATIONS:    Current Outpatient Medications:     pregabalin (LYRICA) 25 MG Cap, pregabalin 25 mg capsule, Disp: , Rfl:     albuterol 108 (90 Base) MCG/ACT Aero Soln inhalation aerosol, INHALE 1 TO 2 PUFFS BY MOUTH EVERY 4 HOURS AS NEEDED FOR WHEEZING, Disp: , Rfl:     albuterol 108 (90 Base) MCG/ACT Aero Soln inhalation aerosol, Inhale 1-2 Puffs., Disp: , Rfl:     rosuvastatin (CRESTOR) 10 MG Tab, Take 1 Tablet by mouth every evening., Disp: 90 Tablet, Rfl: 3    Dulaglutide (TRULICITY) 4.5 MG/0.5ML Solution Pen-injector, Inject 4.5 mL under the skin., Disp: , Rfl:     COMBIGAN 0.2-0.5 % Solution, , Disp: , Rfl:     acetaminophen (TYLENOL) 325 MG Tab, Take 650 mg by mouth every four hours as needed., Disp: , Rfl:     doxycycline (VIBRAMYCIN) 100 MG Tab, TAKE 1 TABLET BY MOUTH TWICE DAILY FOR 7 DAYS (Patient not taking: Reported on 9/13/2022), Disp: , Rfl:     meloxicam (MOBIC) 7.5 MG Tab, Take 1 Tablet by mouth every day. (Patient not taking: Reported on 9/13/2022), Disp: 30 Tablet, Rfl: 2    glucose blood (CONTOUR NEXT TEST) strip, test sugars, Disp: , Rfl:     Dulaglutide (TRULICITY) 4.5 MG/0.5ML Solution Pen-injector, 4.5mg, Disp: , Rfl:  "    PROBLEMS:  Patient Active Problem List    Diagnosis Date Noted    Carpal tunnel syndrome of left wrist 01/12/2022    Diabetic polyneuropathy associated with type 2 diabetes mellitus (HCC) 11/17/2021    Sciatica of right side without back pain 05/31/2018    Dyslipidemia 11/07/2017    Diabetes mellitus type 2 in obese ([facility]) 11/07/2017    Gastroesophageal reflux disease without esophagitis 11/07/2017    Obesity (BMI 30-39.9) 11/07/2017       REVIEW OF SYSTEMS:  Gen.:  No Nausea, Vomiting, fever, Chills.  Heart: No chest pain.  Lungs:  No shortness of Breath.  Psychological: Celso unusual Anxiety depression     PHYSICAL EXAM   Constitutional: Alert, cooperative, not in acute distress.  Cardiovascular:  Rate Rhythm is regular without murmurs rubs clicks.     Thorax & Lungs: Clear to auscultation, no wheezing, rhonchi, or rales  HENT: Normocephalic, Atraumatic.  Eyes: PERRLA, EOMI, Conjunctiva normal.   Neck: Trachia is midline no swelling of the thyroid.   Lymphatic: No lymphadenopathy noted.   Musculoskeletal: Pain to palpation right lower quadrant, no rebound or guarding.  Neurologic: Alert & oriented x 3, cranial nerves II through XII are intact, Normal motor function, Normal sensory function, No focal deficits noted.   Psychiatric: Affect normal, Judgment normal, Mood normal.     VITAL SIGNS:/68   Pulse 82   Temp 36.1 °C (97 °F) (Temporal)   Resp 16   Ht 1.778 m (5' 10\")   Wt 112 kg (247 lb 12.8 oz)   SpO2 95%   BMI 35.56 kg/m²     Labs: Reviewed    Assessment:                                                     Plan:    1. Right lower quadrant pain  CT of the abdomen pelvis ordered  - CT-ABDOMEN-PELVIS W/O; Future        "

## 2022-09-13 NOTE — PROGRESS NOTES
rlq pain Intensity 6/10, quality sharp, radiation rlq to llq, associated signs and symptoms nancy, time component at night, improved with goes away last 2 minutes, worse with nothing. About the same x 2 months.

## 2022-09-14 ENCOUNTER — HOSPITAL ENCOUNTER (OUTPATIENT)
Dept: RADIOLOGY | Facility: MEDICAL CENTER | Age: 77
End: 2022-09-14
Attending: INTERNAL MEDICINE
Payer: MEDICARE

## 2022-09-14 DIAGNOSIS — Z12.31 ENCOUNTER FOR SCREENING MAMMOGRAM FOR MALIGNANT NEOPLASM OF BREAST: ICD-10-CM

## 2022-09-14 PROCEDURE — 77063 BREAST TOMOSYNTHESIS BI: CPT

## 2022-09-15 DIAGNOSIS — R92.8 ABNORMAL MAMMOGRAM: ICD-10-CM

## 2022-09-16 DIAGNOSIS — E66.9 DIABETES MELLITUS TYPE 2 IN OBESE: ICD-10-CM

## 2022-09-16 DIAGNOSIS — R20.2 NUMBNESS AND TINGLING IN LEFT HAND: ICD-10-CM

## 2022-09-16 DIAGNOSIS — E11.69 DIABETES MELLITUS TYPE 2 IN OBESE: ICD-10-CM

## 2022-09-16 DIAGNOSIS — R20.0 NUMBNESS AND TINGLING IN LEFT HAND: ICD-10-CM

## 2022-09-16 RX ORDER — PREGABALIN 25 MG/1
25 CAPSULE ORAL 3 TIMES DAILY
Qty: 90 CAPSULE | Refills: 5 | Status: SHIPPED | OUTPATIENT
Start: 2022-09-16 | End: 2022-10-16

## 2022-09-16 NOTE — TELEPHONE ENCOUNTER
Pt seen on 9/13/22. Her pharmacy said her RX is incorrect. I have a new RX pending for tree times a day for 30 days. QTY 90

## 2022-09-19 ENCOUNTER — HOSPITAL ENCOUNTER (OUTPATIENT)
Dept: RADIOLOGY | Facility: MEDICAL CENTER | Age: 77
End: 2022-09-19
Attending: INTERNAL MEDICINE
Payer: MEDICARE

## 2022-09-19 DIAGNOSIS — R10.31 RIGHT LOWER QUADRANT PAIN: ICD-10-CM

## 2022-09-19 PROCEDURE — 74176 CT ABD & PELVIS W/O CONTRAST: CPT

## 2022-09-20 ENCOUNTER — HOSPITAL ENCOUNTER (OUTPATIENT)
Dept: RADIOLOGY | Facility: MEDICAL CENTER | Age: 77
End: 2022-09-20
Attending: INTERNAL MEDICINE
Payer: MEDICARE

## 2022-09-20 DIAGNOSIS — R92.8 ABNORMAL MAMMOGRAM: ICD-10-CM

## 2022-09-20 DIAGNOSIS — Z12.31 SCREENING MAMMOGRAM FOR BREAST CANCER: ICD-10-CM

## 2022-09-20 PROCEDURE — G0279 TOMOSYNTHESIS, MAMMO: HCPCS

## 2022-09-20 PROCEDURE — 76642 ULTRASOUND BREAST LIMITED: CPT | Mod: LT

## 2022-09-21 ENCOUNTER — APPOINTMENT (OUTPATIENT)
Dept: RADIOLOGY | Facility: MEDICAL CENTER | Age: 77
End: 2022-09-21
Attending: INTERNAL MEDICINE
Payer: MEDICARE

## 2022-10-18 ENCOUNTER — TELEPHONE (OUTPATIENT)
Dept: MEDICAL GROUP | Facility: LAB | Age: 77
End: 2022-10-18
Payer: MEDICARE

## 2022-10-18 NOTE — TELEPHONE ENCOUNTER
I spoke with Lucero and she said she had most recent eye exam done with Dr. Hiro Ng in [place]. I called their office and lvm to fax most recent eye records to us at [phone].    [facility]inal records in chart    

## 2022-11-07 ENCOUNTER — TELEPHONE (OUTPATIENT)
Dept: MEDICAL GROUP | Facility: LAB | Age: 77
End: 2022-11-07
Payer: MEDICARE

## 2022-11-07 RX ORDER — PREGABALIN 25 MG/1
CAPSULE ORAL
COMMUNITY
Start: 2022-10-21 | End: 2022-11-15 | Stop reason: SDUPTHER

## 2022-11-07 RX ORDER — FLASH GLUCOSE SENSOR
KIT MISCELLANEOUS
COMMUNITY

## 2022-11-07 RX ORDER — GLIMEPIRIDE 2 MG/1
1 TABLET ORAL EVERY MORNING
COMMUNITY
End: 2023-04-12

## 2022-11-07 NOTE — TELEPHONE ENCOUNTER
ANNUAL WELLNESS VISIT PRE-VISIT PLANNING    1.  Reviewed notes from the last office visit: Yes    2.  If any orders were ordered or intended to be done prior to visit (i.e. 6 mos follow-up), do we have results/consult notes or has patient scheduled?          Labs - Labs were not ordered at last office visit.  Note: If patient appointment is for lab review and patient did not complete labs, check with provider if OK to reschedule patient until labs completed.         Imaging - Imaging ordered, NOT completed. Patient advised to complete prior to next appointment.         Referrals - No referrals were ordered at last office visit.    3.  Immunizations were updated in Epic using Reconcile Outside Information activity? Yes  4.  Patient is due for the following Health Maintenance Topics:   Health Maintenance Due   Topic Date Due    Annual Wellness Visit  Never done    HEPATITIS C SCREENING  Never done    IMM PNEUMOCOCCAL VACCINE: 65+ Years (1 - PCV) Never done    IMM ZOSTER VACCINES (1 of 2) Never done    COVID-19 Vaccine (3 - Booster for Moderna series) 04/29/2021    URINE ACR / MICROALBUMIN  04/29/2022    IMM INFLUENZA (1) Never done    DIABETES MONOFILAMENT / LE EXAM  09/08/2022   5.  Reviewed/Updated the following with patient:          Preferred Pharmacy? Yes          Preferred Lab? Yes          Preferred Communication? Yes          Allergies? Yes          Medications? YES. Was Abstract Encounter opened and chart updated? YES  6.  Care Team Updated:          DME Company (gait device, O2, CPAP, etc.): N\A          Other Specialists (eye doctor, derm, GYN, cardiology, endo, etc): YES    7.  Patient was advised: “This is a free wellness visit. The provider will screen for medical conditions to help you stay healthy. If you have other concerns to address you may be asked to discuss these at a separate visit or there may be an additional fee.”     8.  AHA (Puls[employer]) form printed for Provider? N/A

## 2022-11-15 ENCOUNTER — OFFICE VISIT (OUTPATIENT)
Dept: MEDICAL GROUP | Facility: LAB | Age: 77
End: 2022-11-15
Payer: MEDICARE

## 2022-11-15 VITALS
HEIGHT: 70 IN | HEART RATE: 77 BPM | TEMPERATURE: 96.6 F | BODY MASS INDEX: 35.93 KG/M2 | WEIGHT: 251 LBS | SYSTOLIC BLOOD PRESSURE: 128 MMHG | RESPIRATION RATE: 16 BRPM | OXYGEN SATURATION: 93 % | DIASTOLIC BLOOD PRESSURE: 66 MMHG

## 2022-11-15 DIAGNOSIS — E66.9 OBESITY (BMI 30-39.9): ICD-10-CM

## 2022-11-15 DIAGNOSIS — E11.42 DIABETIC POLYNEUROPATHY ASSOCIATED WITH TYPE 2 DIABETES MELLITUS (HCC): ICD-10-CM

## 2022-11-15 DIAGNOSIS — R54 AGE-RELATED PHYSICAL DEBILITY: ICD-10-CM

## 2022-11-15 DIAGNOSIS — G56.02 CARPAL TUNNEL SYNDROME ON LEFT: ICD-10-CM

## 2022-11-15 DIAGNOSIS — E66.9 DIABETES MELLITUS TYPE 2 IN OBESE: ICD-10-CM

## 2022-11-15 DIAGNOSIS — E11.69 DIABETES MELLITUS TYPE 2 IN OBESE: ICD-10-CM

## 2022-11-15 PROCEDURE — G0439 PPPS, SUBSEQ VISIT: HCPCS | Performed by: INTERNAL MEDICINE

## 2022-11-15 PROCEDURE — 99214 OFFICE O/P EST MOD 30 MIN: CPT | Mod: 25 | Performed by: INTERNAL MEDICINE

## 2022-11-15 RX ORDER — PREGABALIN 50 MG/1
50 CAPSULE ORAL 3 TIMES DAILY
Qty: 90 CAPSULE | Refills: 2 | Status: SHIPPED | OUTPATIENT
Start: 2022-11-15 | End: 2023-03-02 | Stop reason: SDUPTHER

## 2022-11-15 ASSESSMENT — ENCOUNTER SYMPTOMS: GENERAL WELL-BEING: GOOD

## 2022-11-15 ASSESSMENT — ACTIVITIES OF DAILY LIVING (ADL): BATHING_REQUIRES_ASSISTANCE: 0

## 2022-11-15 ASSESSMENT — FIBROSIS 4 INDEX: FIB4 SCORE: 2.13

## 2022-11-15 ASSESSMENT — PATIENT HEALTH QUESTIONNAIRE - PHQ9: CLINICAL INTERPRETATION OF PHQ2 SCORE: 0

## 2022-11-15 NOTE — PROGRESS NOTES
Chief Complaint   Patient presents with    Annual Exam       HPI:  Lucreo is a 77 y.o. here for Medicare Annual Wellness Visit    Lucero is here for follow-up, is having continued problems with diabetic neuropathy, also carpal tunnel associate with her hands, referral written to orthopedic hand surgeon.  Patient is requesting also physical therapy because of difficulty with her walking and concerns about falling.    Patient Active Problem List    Diagnosis Date Noted    Carpal tunnel syndrome of left wrist 01/12/2022    Diabetic polyneuropathy associated with type 2 diabetes mellitus (HCC) 11/17/2021    Sciatica of right side without back pain 05/31/2018    Dyslipidemia 11/07/2017    Diabetes mellitus type 2 in obese (HCC) 11/07/2017    Gastroesophageal reflux disease without esophagitis 11/07/2017    Obesity (BMI 30-39.9) 11/07/2017       Current Outpatient Medications   Medication Sig Dispense Refill    pregabalin (LYRICA) 25 MG Cap       glimepiride (AMARYL) 2 MG Tab Take 0.5 Tablets by mouth every morning.      meloxicam (MOBIC) 7.5 MG Tab Take 1 Tablet by mouth every day. 30 Tablet 2    rosuvastatin (CRESTOR) 10 MG Tab Take 1 Tablet by mouth every evening. 90 Tablet 3    COMBIGAN 0.2-0.5 % Solution       Dulaglutide (TRULICITY) 4.5 MG/0.5ML Solution Pen-injector 4.5mg      acetaminophen (TYLENOL) 325 MG Tab Take 2 Tablets by mouth every four hours as needed.      Continuous Blood Gluc Sensor (FREESTYLE ANGEL 14 DAY SENSOR) [facility] FreeStyle Angel 14 Day Sensor kit   USE AS DIRECTED AND CHANGE SENSOR EVERY 14 DAYS      glucose blood (CONTOUR NEXT TEST) strip test sugars (Patient not taking: Reported on 11/7/2022)       No current facility-administered medications for this visit.        Patient is taking medications as noted in medication list.  Current supplements as per medication list.     Allergies: Patient has no known allergies.    Current social contact/activities: travel to [place]     Is patient current with  immunizations? Yes.    She  reports that she has never smoked. She has never used smokeless tobacco. She reports current alcohol use of about 0.6 oz per week. She reports that she does not use drugs.  Counseling given: Not Answered      ROS:    Gait: Uses a cane   Ostomy: No   Other tubes: No   Amputations: No   Chronic oxygen use No   Last eye exam 2022   Wears hearing aids: No   : Reports urinary leakage during the last 6 months that has not interfered at all with their daily activities or sleep.    Screening:     Depression Screening  Little interest or pleasure in doing things?  0 - not at all  Feeling down, depressed, or hopeless? 0 - not at all  Patient Health Questionnaire Score: 0    If depressive symptoms identified deferred to follow up visit unless specifically addressed in assessment and plan.    Interpretation of PHQ-9 Total Score   Score Severity   1-4 No Depression   5-9 Mild Depression   10-14 Moderate Depression   15-19 Moderately Severe Depression   20-27 Severe Depression    Screening for Cognitive Impairment  Three Minute Recall (daughter, heaven, edwin)  2/3    Brando clock face with all 12 numbers and set the hands to show 10 past 11.  Yes    If cognitive concerns identified, deferred for follow up unless specifically addressed in assessment and plan.    Fall Risk Assessment  Has the patient had two or more falls in the last year or any fall with injury in the last year?  No  If fall risk identified, deferred for follow up unless specifically addressed in assessment and plan.    Safety Assessment  Throw rugs on floor.  No  Handrails on all stairs.  Yes  Good lighting in all hallways.  Yes  Difficulty hearing.  No  Patient counseled about all safety risks that were identified.    Functional Assessment ADLs  Are there any barriers preventing you from cooking for yourself or meeting nutritional needs?  No.    Are there any barriers preventing you from driving safely or obtaining transportation?   No.    Are there any barriers preventing you from using a telephone or calling for help?  No.    Are there any barriers preventing you from shopping?  No.    Are there any barriers preventing you from taking care of your own finances?  No.    Are there any barriers preventing you from managing your medications?  No.    Are there any barriers preventing you from showering, bathing or dressing yourself?  No.    Are you currently engaging in any exercise or physical activity?  No.     What is your perception of your health?  Good.    Advance Care Planning  Do you have an Advance Directive, Living Will, Durable Power of , or POLST? Yes  Advance Directive       is on file    Health Maintenance Summary            Overdue - Annual Wellness Visit (Every 366 Days) Overdue - never done      No completion history exists for this topic.              Overdue - HEPATITIS C SCREENING (Once) Overdue - never done      No completion history exists for this topic.              Overdue - IMM PNEUMOCOCCAL VACCINE: 65+ Years (1 - PCV) Overdue - never done      No completion history exists for this topic.              Overdue - IMM ZOSTER VACCINES (1 of 2) Overdue - never done      No completion history exists for this topic.              Overdue - COVID-19 Vaccine (3 - Booster for Moderna series) Overdue since 4/29/2021 03/04/2021  Imm Admin: MODERNA SARS-COV-2 VACCINE (12+)    02/04/2021  Imm Admin: MODERNA SARS-COV-2 VACCINE (12+)              Overdue - URINE ACR / MICROALBUMIN (Yearly) Overdue since 4/29/2022 04/29/2021  MICROALBUMIN CREAT RATIO URINE    08/25/2020  MICROALBUMIN CREAT RATIO URINE    06/09/2020  MICROALBUMIN CREAT RATIO URINE    05/31/2018  MICROALBUMIN CREAT RATIO URINE              Overdue - IMM INFLUENZA (1) Overdue - never done      No completion history exists for this topic.              Overdue - DIABETES MONOFILAMENT / LE EXAM (Yearly) Overdue since 9/8/2022 09/08/2021  Diabetic  Monofilament Lower Extremity Exam              A1C SCREENING (Every 6 Months) Next due on 12/24/2022 06/24/2022  Hemoglobin A1c    03/24/2022  HEMOGLOBIN A1C    04/29/2021  HEMOGLOBIN A1C    06/09/2020  HEMOGLOBIN A1C    01/20/2020  POCT Hemoglobin A1C    Only the first 5 history entries have been loaded, but more history exists.              BONE DENSITY (Every 5 Years) Next due on 5/29/2023 05/29/2018  DS-BONE DENSITY STUDY (DEXA)              FASTING LIPID PROFILE (Yearly) Next due on 6/24/2023 06/24/2022  Lipid Profile    04/29/2021  Lipid Profile    04/29/2021  Lipid Profile    08/25/2020  Lipid Profile    06/09/2020  Lipid Profile    Only the first 5 history entries have been loaded, but more history exists.              SERUM CREATININE (Yearly) Next due on 6/24/2023 06/24/2022  Comp Metabolic Panel    04/29/2021  Comp Metabolic Panel    04/29/2021  Comp Metabolic Panel    08/25/2020  Comp Metabolic Panel    06/09/2020  Comp Metabolic Panel    Only the first 5 history entries have been loaded, but more history exists.              RETINAL SCREENING (Yearly) Next due on 7/21/2023 07/21/2022  AMB EXTERNAL RETINAL SCREENING RESULTS    05/02/2018  REFERRAL FOR RETINAL SCREENING EXAM              IMM DTaP/Tdap/Td Vaccine (2 - Td or Tdap) Next due on 1/12/2026 01/12/2016  Imm Admin: Tdap Vaccine              IMM MENINGOCOCCAL ACWY VACCINE (Series Information) Aged Out      No completion history exists for this topic.              Discontinued - MAMMOGRAM  Discontinued        Frequency changed to Never automatically (Topic No Longer Applies)    09/20/2022  MA-DIAGNOSTIC MAMMO LEFT W/TOMOSYNTHESIS W/CAD    09/14/2022  MA-SCREENING MAMMO BILAT W/TOMOSYNTHESIS W/CAD    05/06/2021  MA-DIAGNOSTIC MAMMO BILAT W/TOMOSYNTHESIS W/CAD    04/18/2018  MA-MAMMO SCREENING BILAT W/TONI W/CAD              Discontinued - COLORECTAL CANCER SCREENING  Discontinued        Frequency changed to Never  "automatically (Topic No Longer Applies)    2022  COLOGUARD COLON CANCER SCREENING              Discontinued - IMM HEP B VACCINE  Discontinued      No completion history exists for this topic.                    Patient Care Team:  Jp Crocker D.O. as PCP - General (Internal Medicine)  Catalina Medeiros M.D. (Obstetrics & Gynecology)  Felix Ng (Optometry)  Lalo Singleton M.D. as Attending Team Physician (Orthopedic Surgery)  Joel Mark M.D. (Neurology)    Social History     Tobacco Use    Smoking status: Never    Smokeless tobacco: Never   Vaping Use    Vaping Use: Never used   Substance Use Topics    Alcohol use: Yes     Alcohol/week: 0.6 oz     Types: 1 Glasses of wine per week     Comment: 2-3 per month    Drug use: No     Family History   Problem Relation Age of Onset    Breast Cancer Mother 42    Cancer Mother          at 42 of breast CA    Breast Cancer Sister     Heart Disease Neg Hx      She  has a past medical history of Arthritis, Cataract, Diabetes (HCC), GERD (gastroesophageal reflux disease), High cholesterol, and Pain.   Past Surgical History:   Procedure Laterality Date    LITHOTRIPSY      OTHER      D&C    INGUINAL HERNIA REPAIR Left 2009    OTHER  1972    back surgery       Exam:   /66   Pulse 77   Temp 35.9 °C (96.6 °F) (Temporal)   Resp 16   Ht 1.778 m (5' 10\")   Wt 114 kg (251 lb)   SpO2 93%  Body mass index is 36.01 kg/m².    Hearing good.    Dentition not asked  Alert, oriented in no acute distress.  Eye contact is good, speech goal directed, affect calm       Assessment and Plan. The following treatment and monitoring plan is recommended:     1. Diabetes mellitus type 2 in obese (HCC)  Diabetes with good control    2. Obesity (BMI 30-39.9)  Discussed with the patient possibly increasing her dosage of Trulicity    3. Diabetic polyneuropathy associated with type 2 diabetes mellitus (HCC)  Start Lyrica 50 mg 3 times a day  - pregabalin " (LYRICA) 50 MG capsule; Take 1 Capsule by mouth 3 times a day for 90 days.  Dispense: 90 Capsule; Refill: 2    4. Carpal tunnel syndrome on left  Referral written to orthopedics  - Referral to Orthopedics    5. Age-related physical debility  Patient is concerned about her risk of falling, needs strengthening conditioning  - Referral to Physical Therapy      Services suggested: No services needed at this time  Health Care Screening recommendations as per orders if indicated.  Referrals offered: PT/OT/Nutrition counseling/Behavioral Health/Smoking cessation as per orders if indicated.    Discussion today about general wellness and lifestyle habits:    Prevent falls and reduce trip hazards; Cautioned about securing or removing rugs.  Have a working fire alarm and carbon monoxide detector;   Engage in regular physical activity and social activities     Follow-up: No follow-ups on file.

## 2023-02-01 NOTE — PROGRESS NOTES
Subjective:         Patient ID: Kathryn Marie is a 72 y.o. female.    Chief Complaint: Low-back Pain        Pain  This is a chronic problem. The current episode started more than 1 year ago. The problem occurs daily. The problem has been gradually improving. Associated symptoms include arthralgias and neck pain. Pertinent negatives include no anorexia, chest pain, chills, coughing, diaphoresis, fever, sore throat, vertigo or vomiting.   Review of Systems   Constitutional:  Negative for activity change, appetite change, chills, diaphoresis, fever and unexpected weight change.   HENT:  Negative for drooling, ear discharge, ear pain, facial swelling, nosebleeds, sore throat, trouble swallowing, voice change and goiter.    Eyes:  Negative for photophobia, pain, discharge, redness and visual disturbance.   Respiratory:  Negative for apnea, cough, choking, chest tightness, shortness of breath, wheezing and stridor.    Cardiovascular:  Negative for chest pain, palpitations and leg swelling.   Gastrointestinal:  Negative for abdominal distention, anorexia, diarrhea, rectal pain, vomiting and fecal incontinence.   Endocrine: Negative for cold intolerance, heat intolerance, polydipsia, polyphagia and polyuria.   Genitourinary:  Negative for bladder incontinence, dysuria, flank pain, frequency and hot flashes.   Musculoskeletal:  Positive for arthralgias, back pain, leg pain, neck pain and neck stiffness.   Integumentary:  Negative for color change and pallor.   Allergic/Immunologic: Negative for immunocompromised state.   Neurological:  Negative for dizziness, vertigo, seizures, syncope, facial asymmetry, speech difficulty, light-headedness, coordination difficulties, memory loss and coordination difficulties.   Hematological:  Negative for adenopathy. Does not bruise/bleed easily.   Psychiatric/Behavioral:  Negative for agitation, behavioral problems, confusion, decreased concentration, dysphoric mood, hallucinations,  Allergies and medications reviewed    self-injury and suicidal ideas. The patient is not nervous/anxious and is not hyperactive.          Past Medical History:   Diagnosis Date    Anemia     Atherosclerotic heart disease of native coronary artery without angina pectoris     Carotid artery stenosis 01/15/2014    CHARO  LICA stenosis    Causalgia of lower limb     Cervical radiculopathy     Chronic low back pain     Chronic pain syndrome     CVA (cerebral vascular accident)     right cerebellar    Degenerative joint disease involving multiple joints     Disorder of parathyroid gland, unspecified     Disorder of sacrum     Essential (primary) hypertension     Gammopathy     GERD (gastroesophageal reflux disease)     Heart murmur     Hyperlipidemia     Hyperparathyroidism     Lumbosacral radiculopathy     Lumbosacral spondylosis     Other long term (current) drug therapy     Pernicious anemia     Sciatica     Spinal stenosis of lumbar region     L4-5    Type 2 diabetes mellitus      Past Surgical History:   Procedure Laterality Date    CARPAL TUNNEL RELEASE Right     caudal MOIZ  07/03/2018    CHOLECYSTECTOMY  1975    CORONARY ARTERY BYPASS GRAFT      CORONARY ARTERY BYPASS GRAFT (CABG)      triple    HIP SURGERY Right     INJECTION OF ANESTHETIC AGENT AROUND MEDIAL BRANCH NERVES INNERVATING LUMBAR FACET JOINT Bilateral 1/19/2023    Procedure: Block-nerve-medial branch-lumbar, bilateral L4 through S1;  Surgeon: Ashley Piña MD;  Location: [facility];  Service: Pain Management;  Laterality: Bilateral;    L4-S1 Caudal cath guided MOIZ  07/03/2018    Dr Orta    L5-S1 Caudal cath guided MOIZ  09/26/2014 6/26/2014 4/11/2014    Dr Orta    left shoulder repair Left     NECK SURGERY  2018    does not know what kind    right sacral RF Right 12/26/2013 1/24/2012    Dr Orta    right SIJI Right 10/24/2013    Dr Orta     Social History     Socioeconomic History    Marital status:    Occupational History    Occupation: RETIRED   Tobacco Use    Smoking  "status: Never    Smokeless tobacco: Never   Substance and Sexual Activity    Alcohol use: Not Currently    Drug use: Yes     Types: Hydrocodone     Comment: pain medication with pain treatment     Sexual activity: Not Currently     Family History   Problem Relation Age of Onset    Diabetes Mother     Heart disease Mother     Hypertension Mother     Heart attack Mother     Hypertension Father     Stroke Father     Stroke Sister     Hypertension Sister     Cancer Brother      Review of patient's allergies indicates:  No Known Allergies     Objective:  Vitals:    02/01/23 1435   BP: (!) 169/67   Pulse: 65   Resp: 20   Weight: 55.8 kg (123 lb)   Height: 5' 5" (1.651 m)   PainSc:   9         Physical Exam  Vitals and nursing note reviewed. Exam conducted with a chaperone present.   Constitutional:       General: She is awake. She is not in acute distress.     Appearance: Normal appearance. She is not ill-appearing, toxic-appearing or diaphoretic.   HENT:      Head: Normocephalic and atraumatic.      Nose: Nose normal.      Mouth/Throat:      Mouth: Mucous membranes are moist.      Pharynx: Oropharynx is clear.   Eyes:      Conjunctiva/sclera: Conjunctivae normal.      Pupils: Pupils are equal, round, and reactive to light.   Cardiovascular:      Rate and Rhythm: Normal rate.   Pulmonary:      Effort: Pulmonary effort is normal. No respiratory distress.   Abdominal:      Palpations: Abdomen is soft.   Musculoskeletal:      Cervical back: Normal range of motion and neck supple. Tenderness present.      Thoracic back: Tenderness present.      Lumbar back: Tenderness present. Decreased range of motion.   Skin:     General: Skin is warm and dry.      Coloration: Skin is not jaundiced or pale.   Neurological:      General: No focal deficit present.      Mental Status: She is alert and oriented to person, place, and time. Mental status is at baseline.      Cranial Nerves: No cranial nerve deficit (II-XII).   Psychiatric:        "  Mood and Affect: Mood normal.         Behavior: Behavior normal. Behavior is cooperative.         Thought Content: Thought content normal.         FL Fluoro for Pain Management  See OP Notes for results.     IMPRESSION: See OP Notes for results.     This procedure was auto-finalized by: Virtual Radiologist         Clinical Support on 12/12/2022   Component Date Value Ref Range Status    Sodium 12/12/2022 135 (L)  136 - 145 mmol/L Final    Potassium 12/12/2022 4.8  3.5 - 5.1 mmol/L Final    Chloride 12/12/2022 102  98 - 107 mmol/L Final    CO2 12/12/2022 28  21 - 32 mmol/L Final    Anion Gap 12/12/2022 10  7 - 16 mmol/L Final    Glucose 12/12/2022 109 (H)  74 - 106 mg/dL Final    BUN 12/12/2022 29 (H)  7 - 18 mg/dL Final    Creatinine 12/12/2022 1.10 (H)  0.55 - 1.02 mg/dL Final    BUN/Creatinine Ratio 12/12/2022 26 (H)  6 - 20 Final    Calcium 12/12/2022 9.2  8.5 - 10.1 mg/dL Final    Total Protein 12/12/2022 8.1  6.4 - 8.2 g/dL Final    Albumin 12/12/2022 3.6  3.5 - 5.0 g/dL Final    Globulin 12/12/2022 4.5 (H)  2.0 - 4.0 g/dL Final    A/G Ratio 12/12/2022 0.8   Final    Bilirubin, Total 12/12/2022 0.4  >0.0 - 1.2 mg/dL Final    Alk Phos 12/12/2022 55  55 - 142 U/L Final    ALT 12/12/2022 33  13 - 56 U/L Final    AST 12/12/2022 47 (H)  15 - 37 U/L Final    eGFR 12/12/2022 53 (L)  >=60 mL/min/1.73m² Final    Hemoglobin A1C 12/12/2022 5.7  4.5 - 6.6 % Final    Estimated Average Glucose 12/12/2022 104  mg/dL Final    Triglycerides 12/12/2022 36  35 - 150 mg/dL Final    Cholesterol 12/12/2022 179  0 - 200 mg/dL Final    HDL Cholesterol 12/12/2022 84 (H)  40 - 60 mg/dL Final    Cholesterol/HDL Ratio (Risk Factor) 12/12/2022 2.1   Final    Non-HDL 12/12/2022 95  mg/dL Final    LDL Calculated 12/12/2022 88  mg/dL Final    LDL/HDL 12/12/2022 1.0   Final    VLDL 12/12/2022 7  mg/dL Final    WBC 12/12/2022 3.98 (L)  4.50 - 11.00 K/uL Final    RBC 12/12/2022 3.03 (L)  4.20 - 5.40 M/uL Final    Hemoglobin 12/12/2022 9.5 (L)   12.0 - 16.0 g/dL Final    Hematocrit 12/12/2022 29.5 (L)  38.0 - 47.0 % Final    MCV 12/12/2022 97.4 (H)  80.0 - 96.0 fL Final    MCH 12/12/2022 31.4 (H)  27.0 - 31.0 pg Final    MCHC 12/12/2022 32.2  32.0 - 36.0 g/dL Final    RDW 12/12/2022 15.9 (H)  11.5 - 14.5 % Final    Platelet Count 12/12/2022 170  150 - 400 K/uL Final    MPV 12/12/2022 11.5  9.4 - 12.4 fL Final    Neutrophils % 12/12/2022 80.8 (H)  53.0 - 65.0 % Final    Lymphocytes % 12/12/2022 10.1 (L)  27.0 - 41.0 % Final    Monocytes % 12/12/2022 5.0  2.0 - 6.0 % Final    Eosinophils % 12/12/2022 0.3 (L)  1.0 - 4.0 % Final    Basophils % 12/12/2022 0.0  0.0 - 1.0 % Final    Immature Granulocytes % 12/12/2022 3.8 (H)  0.0 - 0.4 % Final    nRBC, Auto 12/12/2022 0.0  <=0.0 % Final    Neutrophils, Abs 12/12/2022 3.22  1.80 - 7.70 K/uL Final    Lymphocytes, Absolute 12/12/2022 0.40 (L)  1.00 - 4.80 K/uL Final    Monocytes, Absolute 12/12/2022 0.20  0.00 - 0.80 K/uL Final    Eosinophils, Absolute 12/12/2022 0.01  0.00 - 0.50 K/uL Final    Basophils, Absolute 12/12/2022 0.00  0.00 - 0.20 K/uL Final    Immature Granulocytes, Absolute 12/12/2022 0.15 (H)  0.00 - 0.04 K/uL Final    nRBC, Absolute 12/12/2022 0.00  <=0.00 x10e3/uL Final    Diff Type 12/12/2022 Manual   Final    Segmented Neutrophils, Man % 12/12/2022 84 (H)  50 - 62 % Final    Bands, Man % 12/12/2022 5  1 - 5 % Final    Lymphocytes, Man % 12/12/2022 5 (L)  27 - 41 % Final    Monocytes, Man % 12/12/2022 5  2 - 6 % Final    Platelet Morphology 12/12/2022 Normal  Normal Final    RBC Morphology 12/12/2022 Normal   Final    Vitamin B12 12/12/2022 >6,000 (H)  193 - 986 pg/mL Final    Folate 12/12/2022 >20.0 (H)  3.1 - 17.5 ng/mL Final    Homocysteine 12/12/2022 20.9 (H)  3.2 - 10.7 µmol/L Final    GGT 12/12/2022 46  5 - 55 U/L Final   Office Visit on 11/08/2022   Component Date Value Ref Range Status    Culture, Urine 11/08/2022 No Growth   Final    Color, UA 11/08/2022 Light-Yellow  Colorless, Straw,  Yellow, Light Yellow, Dark Yellow Final    Clarity, UA 11/08/2022 Clear  Clear Final    pH, UA 11/08/2022 5.5  5.0 to 8.0 pH Units Final    Leukocytes, UA 11/08/2022 Negative  Negative Final    Nitrites, UA 11/08/2022 Negative  Negative Final    Protein, UA 11/08/2022 Negative  Negative Final    Glucose, UA 11/08/2022 Normal  Normal mg/dL Final    Ketones, UA 11/08/2022 Negative  Negative mg/dL Final    Urobilinogen, UA 11/08/2022 Normal  0.2, 1.0, Normal mg/dL Final    Bilirubin, UA 11/08/2022 Negative  Negative Final    Blood, UA 11/08/2022 Negative  Negative Final    Specific Gravity, UA 11/08/2022 1.015  <=1.030 Final    WBC, UA 11/08/2022 2  <=5 /hpf Final    Bacteria, UA 11/08/2022 Occ (A)  None Seen /hpf Final    Squamous Epithelial Cells, UA 11/08/2022 Moderate (A)  None Seen /HPF Final   Office Visit on 10/25/2022   Component Date Value Ref Range Status    POC Amphetamines 10/25/2022 Negative  Negative, Inconclusive Final    POC Barbiturates 10/25/2022 Negative  Negative, Inconclusive Final    POC Benzodiazepines 10/25/2022 Negative  Negative, Inconclusive Final    POC Cocaine 10/25/2022 Negative  Negative, Inconclusive Final    POC THC 10/25/2022 Negative  Negative, Inconclusive Final    POC Methadone 10/25/2022 Negative  Negative, Inconclusive Final    POC Methamphetamine 10/25/2022 Negative  Negative, Inconclusive Final    POC Opiates 10/25/2022 Presumptive Positive (A)  Negative, Inconclusive Final    POC Oxycodone 10/25/2022 Negative  Negative, Inconclusive Final    POC Phencyclidine 10/25/2022 Negative  Negative, Inconclusive Final    POC Methylenedioxymethamphetamine * 10/25/2022 Negative  Negative, Inconclusive Final    POC Tricyclic Antidepressants 10/25/2022 Negative  Negative, Inconclusive Final    POC Buprenorphine 10/25/2022 Negative   Final     Acceptable 10/25/2022 Yes   Final    POC Temperature (Urine) 10/25/2022 90   Final   Office Visit on 10/18/2022   Component Date Value  Ref Range Status    Color, UA 10/18/2022 Light-Yellow  Colorless, Straw, Yellow, Light Yellow, Dark Yellow Final    Clarity, UA 10/18/2022 Clear  Clear Final    pH, UA 10/18/2022 6.0  5.0 to 8.0 pH Units Final    Leukocytes, UA 10/18/2022 Negative  Negative Final    Nitrites, UA 10/18/2022 Negative  Negative Final    Protein, UA 10/18/2022 Negative  Negative Final    Glucose, UA 10/18/2022 Normal  Normal mg/dL Final    Ketones, UA 10/18/2022 Negative  Negative mg/dL Final    Urobilinogen, UA 10/18/2022 Normal  0.2, 1.0, Normal mg/dL Final    Bilirubin, UA 10/18/2022 Negative  Negative Final    Blood, UA 10/18/2022 Negative  Negative Final    Specific Gravity, UA 10/18/2022 1.015  <=1.030 Final    Case Report 10/18/2022    Final                    Value:Pap Cytology                                      Case: [id number]                                   Authorizing Provider:  Cong Ferrer MD      Collected:           10/18/2022 03:45 PM          Ordering Location:     [facility] Received:            10/19/2022 09:25 AM                                 - Obstetrics And                                                                                    Gynecology                                                                   First Screen:          CHRISTIANO Mahmood(ASCP)                                                    Specimen:    Liquid-Based Pap Test, Screening, Cervix                                                   Interpretation 10/18/2022 Negative              Final    General Categorization 10/18/2022 Negative for intraepithelial lesion or malignancy   Final    Specimen Adequacy 10/18/2022 Satisfactory for evaluation  No endocervical component   Final    Clinical Information 10/18/2022    Final                    Value:This result contains rich text formatting which cannot be displayed here.    Disclaimer 10/18/2022    Final                    Value:This result contains rich text  formatting which cannot be displayed here.    WBC, UA 10/18/2022 1  <=5 /hpf Final    RBC, UA 10/18/2022 1  <=3 /hpf Final    Squamous Epithelial Cells, UA 10/18/2022 Occasional (A)  None Seen /HPF Final    Mucous 10/18/2022 Occasional (A)  None Seen /LPF Final         No orders of the defined types were placed in this encounter.        Requested Prescriptions     Pending Prescriptions Disp Refills    HYDROcodone-acetaminophen (NORCO)  mg per tablet 120 tablet 0     Sig: Take 1 tablet by mouth every 6 (six) hours as needed for Pain.    naloxone (NARCAN) 4 mg/actuation Spry 2 each 0     Si sprays (8 mg total) by Nasal route once. Call 911, Two sprays alternate nostril, may repeat 2-3 minutes as needed for 1 dose    HYDROcodone-acetaminophen (NORCO)  mg per tablet 120 tablet 0     Sig: Take 1 tablet by mouth every 6 (six) hours as needed for Pain.    HYDROcodone-acetaminophen (NORCO)  mg per tablet 120 tablet 0     Sig: Take 1 tablet by mouth every 6 (six) hours as needed for Pain.       Assessment:     1. Cervical radiculopathy    2. Lumbosacral spondylosis without myelopathy    3. Postlaminectomy syndrome of cervical region    4. Disorder of sacrum         A's of Opioid Risk Assessment  Activity:Patient can perform ADL.   Analgesia:Patients pain is partially controlled by current medication. Patient has tried OTC medications such as Tylenol and Ibuprofen with out relief.   Adverse Effects: Patient denies constipation or sedation.  Aberrant Behavior:  reviewed with no aberrant drug seeking/taking behavior.  Overdose reversal drug naloxone discussed    Drug screen reviewed      Plan:    Narcan 2023    Rheumatoid arthritis [facility]    Using 4 point walker/wheelchair assistance ambulation    Will need to obtain clearance to hold Plavix    Follow-up after lumbar bilateral medial branch block L4 through S1 # 1, 2023, she states she would 85% relief after procedure, the procedure  did help improve her level of function     She is requesting repeat procedure for remaining back pain worse with flexion extension rotation lumbar spine tenderness long the facet joint area her pain is facet joint in nature ongoing for more than 3 months    x-ray sacroiliac joint lumbar spine   X-ray lumbar spine [facility] November 28, 2022  Grade 1 anterior listhesis L3/4 multiple level degenerative changes pedicle screws rods posterior L4/5 interbody hardware L4/5  Prominent degenerative changes L2/3 L3/4 moderate facet joint arthropathy throughout    X-ray sacroiliac joints [facility] November 28, 2022 osteoarthritis sacroiliac joints    Requesting for procedure for back pain    Continue home exercise program as directed    Indications for this procedure for this specific patient include the following   - Pt has had symptoms for three months with moderate to severe pain with functional impairment rated of 7/10 pain.   - Pain non-responsive to conservative care.    - Pain predominately axial and not associated with radiculopathy or claudication.    - No non-facet pathology as source of pain.    - Clinical assessment implicates facet joint as putative pain source.    - facet loading maneuver positive  - Pain is exacerbated by extension or prolonged sitting/standing and relieved by rest.    - No unexplained neurologic deficit.    - No history of coagulopathy, infection or unstable medical conditions.    - Pain is causing significant functional limitation resulting in diminished quality of life and impaired age appropriate ADL's.   - Clinical assessment implicates facet joint as putative source of pain  - Repeat injections not done prior to 7 days   - no more than 2 levels will be done    The planned medically necessary  surgical procedure is performed in a hospital outpatient department and not in an ambulatory surgical center due to:     -there is no geographically assessable ambulatory surgery center that  has the  necessary equipment and fluoroscopy needed for the procedure     -there is no geographically assessable ambulatory surgical center available at which the physician has privileges     -an ASC's  specific  guideline regarding the individuals weight or health conditions that prevent the use of an ASC    Monitor anesthesia request is medically indicated for the scheduled nerve block procedure due to:  1- needle phobia and anxiety, placing  the patient at risk during the provided service.  2-patient has an ASA class greater than 3 and requires constant presence of an anesthesiologist during the procedure,   3-patient has severe problems hard to lie still  4-patient suffers from chronic pain and is unable to function due to  diminished ADLs    Schedule bilateral lumbar medial branch block L4 through S1 # 2, lumbosacral spondylosis    Dr. Piña,     Bring original prescription medication bottles/container/box with labels to each visit    Pill count    Physical therapy    Massage therapy declines

## 2023-02-24 DIAGNOSIS — E11.69 DIABETES MELLITUS TYPE 2 IN OBESE: ICD-10-CM

## 2023-02-24 DIAGNOSIS — R20.2 NUMBNESS AND TINGLING IN LEFT HAND: ICD-10-CM

## 2023-02-24 DIAGNOSIS — E66.9 DIABETES MELLITUS TYPE 2 IN OBESE: ICD-10-CM

## 2023-02-24 DIAGNOSIS — R20.0 NUMBNESS AND TINGLING IN LEFT HAND: ICD-10-CM

## 2023-02-24 RX ORDER — PREGABALIN 25 MG/1
25 CAPSULE ORAL 3 TIMES DAILY
Qty: 90 CAPSULE | Refills: 2 | Status: SHIPPED | OUTPATIENT
Start: 2023-02-24 | End: 2023-04-25

## 2023-02-24 NOTE — TELEPHONE ENCOUNTER
Received request via: Pharmacy    Was the patient seen in the last year in this department? Yes  11/15/22  Does the patient have an active prescription (recently filled or refills available) for medication(s) requested? No    Does the patient have retirement Plus and need 100 day supply (blood pressure, diabetes and cholesterol meds only)? Medication is not for cholesterol, blood pressure or diabetes

## 2023-03-02 DIAGNOSIS — E11.42 DIABETIC POLYNEUROPATHY ASSOCIATED WITH TYPE 2 DIABETES MELLITUS (HCC): ICD-10-CM

## 2023-03-02 RX ORDER — PREGABALIN 50 MG/1
50 CAPSULE ORAL 3 TIMES DAILY
Qty: 90 CAPSULE | Refills: 2 | Status: SHIPPED | OUTPATIENT
Start: 2023-03-02 | End: 2023-05-31

## 2023-03-02 NOTE — TELEPHONE ENCOUNTER
Received request via: Patient    Was the patient seen in the last year in this department? Yes    Does the patient have an active prescription (recently filled or refills available) for medication(s) requested? No    Does the patient have jail Plus and need 100 day supply (blood pressure, diabetes and cholesterol meds only)? Patient does not have SCP      25 mg was called in.  She states that she takes 50 mg.

## 2023-03-09 ENCOUNTER — APPOINTMENT (OUTPATIENT)
Dept: MEDICAL GROUP | Facility: LAB | Age: 78
End: 2023-03-09
Payer: MEDICARE

## 2023-04-12 ENCOUNTER — OFFICE VISIT (OUTPATIENT)
Dept: MEDICAL GROUP | Facility: LAB | Age: 78
End: 2023-04-12
Payer: MEDICARE

## 2023-04-12 VITALS
DIASTOLIC BLOOD PRESSURE: 64 MMHG | HEIGHT: 71 IN | WEIGHT: 251 LBS | OXYGEN SATURATION: 95 % | BODY MASS INDEX: 35.14 KG/M2 | TEMPERATURE: 97.1 F | RESPIRATION RATE: 12 BRPM | HEART RATE: 81 BPM | SYSTOLIC BLOOD PRESSURE: 118 MMHG

## 2023-04-12 DIAGNOSIS — E66.9 DIABETES MELLITUS TYPE 2 IN OBESE: ICD-10-CM

## 2023-04-12 DIAGNOSIS — E11.69 DIABETES MELLITUS TYPE 2 IN OBESE: ICD-10-CM

## 2023-04-12 DIAGNOSIS — M54.2 NECK PAIN: ICD-10-CM

## 2023-04-12 LAB
HBA1C MFR BLD: 6.9 % (ref ?–5.8)
POCT INT CON NEG: NEGATIVE
POCT INT CON POS: POSITIVE

## 2023-04-12 PROCEDURE — 83036 HEMOGLOBIN GLYCOSYLATED A1C: CPT | Performed by: INTERNAL MEDICINE

## 2023-04-12 PROCEDURE — 99213 OFFICE O/P EST LOW 20 MIN: CPT | Performed by: INTERNAL MEDICINE

## 2023-04-12 ASSESSMENT — FIBROSIS 4 INDEX: FIB4 SCORE: 2.13

## 2023-04-12 ASSESSMENT — PATIENT HEALTH QUESTIONNAIRE - PHQ9: CLINICAL INTERPRETATION OF PHQ2 SCORE: 0

## 2023-04-13 NOTE — PROGRESS NOTES
CC: Lucero Palm is a 77 y.o. female is suffering from   Chief Complaint   Patient presents with    Diabetes Follow-up    Weight Gain         SUBJECTIVE:  1. Diabetes mellitus type 2 in obese (HCC)  Lucero is here for follow-up has a history of type 2 diabetes with significant improvement in her glycosylated hemoglobin appears to be doing reasonably well    2. Neck pain  Patient also is having problems with neck pain radiating into her left hand consistent with possible C6 nerve root impingement        Past social, family, history:   Social History     Tobacco Use    Smoking status: Never    Smokeless tobacco: Never   Vaping Use    Vaping Use: Never used   Substance Use Topics    Alcohol use: Yes     Alcohol/week: 0.6 oz     Types: 1 Glasses of wine per week     Comment: 2-3 per month    Drug use: No         MEDICATIONS:    Current Outpatient Medications:     pregabalin (LYRICA) 50 MG capsule, Take 1 Capsule by mouth 3 times a day for 90 days., Disp: 90 Capsule, Rfl: 2    rosuvastatin (CRESTOR) 10 MG Tab, Take 1 Tablet by mouth every evening., Disp: 90 Tablet, Rfl: 3    COMBIGAN 0.2-0.5 % Solution, , Disp: , Rfl:     Dulaglutide (TRULICITY) 4.5 MG/0.5ML Solution Pen-injector, 4.5mg, Disp: , Rfl:     pregabalin (LYRICA) 25 MG Cap, Take 1 Capsule by mouth 3 times a day for 60 days. (Patient not taking: Reported on 4/12/2023), Disp: 90 Capsule, Rfl: 2    Continuous Blood Gluc Sensor (FREESTYLE ANGEL 14 DAY SENSOR) [facility], FreeStyle Angel 14 Day Sensor kit  USE AS DIRECTED AND CHANGE SENSOR EVERY 14 DAYS, Disp: , Rfl:     meloxicam (MOBIC) 7.5 MG Tab, Take 1 Tablet by mouth every day. (Patient not taking: Reported on 4/12/2023), Disp: 30 Tablet, Rfl: 2    glucose blood (CONTOUR NEXT TEST) strip, test sugars (Patient not taking: Reported on 11/7/2022), Disp: , Rfl:     acetaminophen (TYLENOL) 325 MG Tab, Take 2 Tablets by mouth every four hours as needed., Disp: , Rfl:     PROBLEMS:  Patient Active Problem  "List    Diagnosis Date Noted    Carpal tunnel syndrome of left wrist 01/12/2022    Diabetic polyneuropathy associated with type 2 diabetes mellitus (HCC) 11/17/2021    Sciatica of right side without back pain 05/31/2018    Dyslipidemia 11/07/2017    Diabetes mellitus type 2 in obese ([facility]) 11/07/2017    Gastroesophageal reflux disease without esophagitis 11/07/2017    Obesity (BMI 30-39.9) 11/07/2017       REVIEW OF SYSTEMS:  Gen.:  No Nausea, Vomiting, fever, Chills.  Heart: No chest pain.  Lungs:  No shortness of Breath.  Psychological: Celso unusual Anxiety depression     PHYSICAL EXAM   Constitutional: Alert, cooperative, not in acute distress.  Cardiovascular:  Rate Rhythm is regular without murmurs rubs clicks.     Thorax & Lungs: Clear to auscultation, no wheezing, rhonchi, or rales  HENT: Normocephalic, Atraumatic.  Eyes: PERRLA, EOMI, Conjunctiva normal.   Neck: Trachia is midline no swelling of the thyroid.   Lymphatic: No lymphadenopathy noted.   Musculoskeletal: Tinel's sign left hand  Neurologic: Alert & oriented x 3, cranial nerves II through XII are intact, Normal motor function, Normal sensory function, No focal deficits noted.   Psychiatric: Affect normal, Judgment normal, Mood normal.     VITAL SIGNS:/64   Pulse 81   Temp 36.2 °C (97.1 °F)   Resp 12   Ht 1.803 m (5' 11\")   Wt 114 kg (251 lb)   SpO2 95%   BMI 35.01 kg/m²     Labs: Reviewed    Assessment:                                                     Plan:    1. Diabetes mellitus type 2 in obese (HCC)  Type 2 diabetes with reasonable control no change in medical therapy  - POCT Hemoglobin A1C  - DX-CERVICAL SPINE-2 OR 3 VIEWS; Future    2. Neck pain  Neck pain with radiation to the left hand consistent with probable C6 nerve root distribution x-rays ordered  - DX-CERVICAL SPINE-2 OR 3 VIEWS; Future        "

## 2023-04-25 DIAGNOSIS — R92.8 ABNORMAL MAMMOGRAM OF RIGHT BREAST: ICD-10-CM

## 2023-05-06 DIAGNOSIS — N95.9 UNSPECIFIED MENOPAUSAL AND PERIMENOPAUSAL DISORDER: ICD-10-CM

## 2023-05-06 DIAGNOSIS — N95.1 MENOPAUSAL STATE: ICD-10-CM

## 2023-05-11 ENCOUNTER — HOSPITAL ENCOUNTER (OUTPATIENT)
Dept: RADIOLOGY | Facility: MEDICAL CENTER | Age: 78
End: 2023-05-11
Attending: INTERNAL MEDICINE
Payer: MEDICARE

## 2023-05-11 DIAGNOSIS — M54.2 NECK PAIN: ICD-10-CM

## 2023-05-11 DIAGNOSIS — E66.9 DIABETES MELLITUS TYPE 2 IN OBESE: ICD-10-CM

## 2023-05-11 DIAGNOSIS — E11.69 DIABETES MELLITUS TYPE 2 IN OBESE: ICD-10-CM

## 2023-05-11 PROCEDURE — 72040 X-RAY EXAM NECK SPINE 2-3 VW: CPT

## 2023-05-18 DIAGNOSIS — R92.8 ABNORMAL MAMMOGRAM: ICD-10-CM

## 2023-06-22 ENCOUNTER — TELEPHONE (OUTPATIENT)
Dept: MEDICAL GROUP | Facility: LAB | Age: 78
End: 2023-06-22
Payer: MEDICARE

## 2023-06-22 DIAGNOSIS — M25.562 ACUTE PAIN OF LEFT KNEE: ICD-10-CM

## 2023-06-22 NOTE — TELEPHONE ENCOUNTER
1. Caller Name: Lucero                         Call Back Number: [phone] (home)        How would the patient prefer to be contacted with a response: Phone call OK to leave a detailed message    Pt has left knee pain since Monday.  She had just stood up from the couch when it started hurting.  She is asking for something for pain.  She is wanting to rest it and hope it gets better that way.

## 2023-06-23 RX ORDER — TRAMADOL HYDROCHLORIDE 50 MG/1
50 TABLET ORAL EVERY 8 HOURS PRN
Qty: 21 TABLET | Refills: 0 | Status: SHIPPED | OUTPATIENT
Start: 2023-06-23 | End: 2023-06-30

## 2023-06-23 NOTE — TELEPHONE ENCOUNTER
Telephone call to patient's phone number, answering machine only, have ordered an x-ray of patient's left knee also called in 1 week worth of tramadol at 1 tablet 3 times a day.

## 2023-07-12 ENCOUNTER — TELEPHONE (OUTPATIENT)
Dept: MEDICAL GROUP | Facility: LAB | Age: 78
End: 2023-07-12
Payer: MEDICARE

## 2023-07-12 RX ORDER — PREGABALIN 25 MG/1
50 CAPSULE ORAL 3 TIMES DAILY
COMMUNITY
End: 2023-07-19

## 2023-07-12 NOTE — TELEPHONE ENCOUNTER
ANNUAL WELLNESS VISIT PRE-VISIT PLANNING    1.  Reviewed notes from the last office visit: Yes    2.  If any orders were ordered or intended to be done prior to visit (i.e. 6 mos follow-up), do we have results/consult notes or has patient scheduled?          Labs - Labs ordered, completed on 4/12/23 and results are in chart.  Note: If patient appointment is for lab review and patient did not complete labs, check with provider if OK to reschedule patient until labs completed.         Imaging - Imaging was not ordered at last office visit.         Referrals - No referrals were ordered at last office visit.    3.  Immunizations were updated in Epic using Reconcile Outside Information activity? Yes  4.  Patient is due for the following Health Maintenance Topics:   Health Maintenance Due   Topic Date Due    Annual Wellness Visit  Never done    HEPATITIS C SCREENING  Never done    IMM PNEUMOCOCCAL VACCINE: 65+ Years (1 - PCV) Never done    IMM ZOSTER VACCINES (1 of 2) Never done    COVID-19 Vaccine (3 - Moderna series) 04/29/2021    URINE ACR / MICROALBUMIN  04/29/2022    DIABETES MONOFILAMENT / LE EXAM  09/08/2022    BONE DENSITY  05/29/2023    FASTING LIPID PROFILE  06/24/2023    SERUM CREATININE  06/24/2023    RETINAL SCREENING  07/21/2023     5.  Reviewed/Updated the following with patient:          Preferred Pharmacy? Yes          Preferred Lab? Yes          Preferred Communication? Yes          Allergies? Yes          Medications? YES. Was Abstract Encounter opened and chart updated? YES      6.  Care Team Updated:          DME Company (gait device, O2, CPAP, etc.): N\A          Other Specialists (eye doctor, derm, GYN, cardiology, endo, etc): YES    7.  Patient was advised: “This is a free wellness visit. The provider will screen for medical conditions to help you stay healthy. If you have other concerns to address you may be asked to discuss these at a separate visit or there may be an additional fee.”     8.  AHA  (Puls8) form printed for Provider? N/A

## 2023-07-12 NOTE — TELEPHONE ENCOUNTER
Pt said she had eye exam within the last few months. I faxed letter to get records [phone] see letters.    Received records from 7/21/2022, I lvm she will need new yearly eye exam soon after 7/21/23. And to make appt if she does not already have one.

## 2023-07-19 ENCOUNTER — OFFICE VISIT (OUTPATIENT)
Dept: MEDICAL GROUP | Facility: LAB | Age: 78
End: 2023-07-19
Payer: MEDICARE

## 2023-07-19 ENCOUNTER — HOSPITAL ENCOUNTER (OUTPATIENT)
Facility: MEDICAL CENTER | Age: 78
End: 2023-07-19
Attending: INTERNAL MEDICINE
Payer: MEDICARE

## 2023-07-19 VITALS
TEMPERATURE: 98.2 F | HEART RATE: 86 BPM | HEIGHT: 71 IN | BODY MASS INDEX: 34.3 KG/M2 | OXYGEN SATURATION: 94 % | RESPIRATION RATE: 12 BRPM | DIASTOLIC BLOOD PRESSURE: 70 MMHG | WEIGHT: 245 LBS | SYSTOLIC BLOOD PRESSURE: 130 MMHG

## 2023-07-19 DIAGNOSIS — E11.42 DIABETIC POLYNEUROPATHY ASSOCIATED WITH TYPE 2 DIABETES MELLITUS (HCC): ICD-10-CM

## 2023-07-19 DIAGNOSIS — R20.0 NUMBNESS AND TINGLING IN LEFT HAND: ICD-10-CM

## 2023-07-19 DIAGNOSIS — E78.5 DYSLIPIDEMIA: ICD-10-CM

## 2023-07-19 DIAGNOSIS — E66.9 DIABETES MELLITUS TYPE 2 IN OBESE: ICD-10-CM

## 2023-07-19 DIAGNOSIS — E11.69 DIABETES MELLITUS TYPE 2 IN OBESE: ICD-10-CM

## 2023-07-19 DIAGNOSIS — R20.2 NUMBNESS AND TINGLING IN LEFT HAND: ICD-10-CM

## 2023-07-19 LAB
CREAT UR-MCNC: 106.34 MG/DL
MICROALBUMIN UR-MCNC: <1.2 MG/DL
MICROALBUMIN/CREAT UR: NORMAL MG/G (ref 0–30)

## 2023-07-19 PROCEDURE — 3078F DIAST BP <80 MM HG: CPT | Performed by: INTERNAL MEDICINE

## 2023-07-19 PROCEDURE — 99214 OFFICE O/P EST MOD 30 MIN: CPT | Mod: 25 | Performed by: INTERNAL MEDICINE

## 2023-07-19 PROCEDURE — 82043 UR ALBUMIN QUANTITATIVE: CPT

## 2023-07-19 PROCEDURE — 3075F SYST BP GE 130 - 139MM HG: CPT | Performed by: INTERNAL MEDICINE

## 2023-07-19 PROCEDURE — 82570 ASSAY OF URINE CREATININE: CPT

## 2023-07-19 RX ORDER — PREGABALIN 50 MG/1
CAPSULE ORAL
COMMUNITY
Start: 2023-07-17 | End: 2023-08-23

## 2023-07-19 ASSESSMENT — PATIENT HEALTH QUESTIONNAIRE - PHQ9: CLINICAL INTERPRETATION OF PHQ2 SCORE: 0

## 2023-07-19 ASSESSMENT — ACTIVITIES OF DAILY LIVING (ADL): BATHING_REQUIRES_ASSISTANCE: 0

## 2023-07-19 ASSESSMENT — ENCOUNTER SYMPTOMS: GENERAL WELL-BEING: GOOD

## 2023-07-19 NOTE — PROGRESS NOTES
Chief Complaint   Patient presents with    Annual Exam       HPI:  Lucero is a 77 y.o. here for Medicare Annual Wellness Visit    Patient is here for follow-up, we discussed that she is reasonably well controlled with her type 2 diabetes.  Patient is having problems with numbness and tingling especially with her left arm and see 6 distribution, x-rays reviewed which show straightening of her cervical spine, possible impingement, MRI has been ordered    Patient Active Problem List    Diagnosis Date Noted    Carpal tunnel syndrome of left wrist 01/12/2022    Diabetic polyneuropathy associated with type 2 diabetes mellitus (HCC) 11/17/2021    Sciatica of right side without back pain 05/31/2018    Dyslipidemia 11/07/2017    Diabetes mellitus type 2 in obese (HCC) 11/07/2017    Gastroesophageal reflux disease without esophagitis 11/07/2017    Obesity (BMI 30-39.9) 11/07/2017       Current Outpatient Medications   Medication Sig Dispense Refill    pregabalin (LYRICA) 50 MG capsule       COMBIGAN 0.2-0.5 % Solution       Dulaglutide (TRULICITY) 4.5 MG/0.5ML Solution Pen-injector 4.5mg      Continuous Blood Gluc Sensor (FREESTYLE ANGEL 14 DAY SENSOR) [facility] FreeStyle Angel 14 Day Sensor kit   USE AS DIRECTED AND CHANGE SENSOR EVERY 14 DAYS      glucose blood (CONTOUR NEXT TEST) strip        No current facility-administered medications for this visit.        Patient is taking medications as noted in medication list.  Current supplements as per medication list.     Allergies: Patient has no known allergies.    Current social contact/activities: shopping, cooking, gardening, cleaning, organize, travel     Is patient current with immunizations? Yes.    She  reports that she has never smoked. She has never used smokeless tobacco. She reports current alcohol use of about 0.6 oz of alcohol per week. She reports that she does not use drugs.  Counseling given: Not Answered      ROS:    Gait: Uses a cane   Ostomy: No   Other tubes: No    Amputations: No   Chronic oxygen use No   Last eye exam 2022 she will make appt  Wears hearing aids: No   : Denies any urinary leakage during the last 6 months    Screening:     Depression Screening  Little interest or pleasure in doing things?  0 - not at all  Feeling down, depressed, or hopeless? 0 - not at all  Patient Health Questionnaire Score: 0    If depressive symptoms identified deferred to follow up visit unless specifically addressed in assessment and plan.    Interpretation of PHQ-9 Total Score   Score Severity   1-4 No Depression   5-9 Mild Depression   10-14 Moderate Depression   15-19 Moderately Severe Depression   20-27 Severe Depression    Screening for Cognitive Impairment  Three Minute Recall (daughter, heaven, mountain)  3/3    Brando clock face with all 12 numbers and set the hands to show 10 past 11.  Yes    If cognitive concerns identified, deferred for follow up unless specifically addressed in assessment and plan.    Fall Risk Assessment  Has the patient had two or more falls in the last year or any fall with injury in the last year?  No  If fall risk identified, deferred for follow up unless specifically addressed in assessment and plan.    Safety Assessment  Throw rugs on floor.  No  Handrails on all stairs.  No  Good lighting in all hallways.  Yes  Difficulty hearing.  No  Patient counseled about all safety risks that were identified.    Functional Assessment ADLs  Are there any barriers preventing you from cooking for yourself or meeting nutritional needs?  No.    Are there any barriers preventing you from driving safely or obtaining transportation?  No.    Are there any barriers preventing you from using a telephone or calling for help?  No.    Are there any barriers preventing you from shopping?  No.    Are there any barriers preventing you from taking care of your own finances?  No.    Are there any barriers preventing you from managing your medications?  No.    Are there any  barriers preventing you from showering, bathing or dressing yourself?  No.    Are you currently engaging in any exercise or physical activity?  No.     What is your perception of your health?  Good.    Advance Care Planning  Do you have an Advance Directive, Living Will, Durable Power of , or POLST? Yes  Advance Directive       is on file    Health Maintenance Summary            Overdue - Annual Wellness Visit (Every 366 Days) Overdue - never done      No completion history exists for this topic.              Overdue - HEPATITIS C SCREENING (Once) Overdue - never done      No completion history exists for this topic.              Overdue - IMM PNEUMOCOCCAL VACCINE: 65+ Years (1 - PCV) Overdue - never done      No completion history exists for this topic.              Overdue - IMM ZOSTER VACCINES (1 of 2) Overdue - never done      No completion history exists for this topic.              Overdue - COVID-19 Vaccine (3 - Moderna series) Overdue since 4/29/2021 03/04/2021  Imm Admin: MODERNA SARS-COV-2 VACCINE (12+)    02/04/2021  Imm Admin: MODERNA SARS-COV-2 VACCINE (12+)              Ordered - URINE ACR / MICROALBUMIN (Yearly) Ordered on 7/19/2023 04/29/2021  MICROALBUMIN CREAT RATIO URINE    08/25/2020  MICROALBUMIN CREAT RATIO URINE    06/09/2020  MICROALBUMIN CREAT RATIO URINE    05/31/2018  MICROALBUMIN CREAT RATIO URINE              Overdue - DIABETES MONOFILAMENT / LE EXAM (Yearly) Overdue since 9/8/2022 09/08/2021  Diabetic Monofilament Lower Extremity Exam              Ordered - BONE DENSITY (Every 5 Years) Ordered on 5/6/2023 05/29/2018  DS-BONE DENSITY STUDY (DEXA)              Overdue - FASTING LIPID PROFILE (Yearly) Overdue since 6/24/2023 06/24/2022  Lipid Profile    04/29/2021  Lipid Profile    04/29/2021  Lipid Profile    08/25/2020  Lipid Profile    06/09/2020  Lipid Profile    Only the first 5 history entries have been loaded, but more history exists.               Overdue - SERUM CREATININE (Yearly) Overdue since 6/24/2023 06/24/2022  Comp Metabolic Panel    04/29/2021  Comp Metabolic Panel    04/29/2021  Comp Metabolic Panel    08/25/2020  Comp Metabolic Panel    06/09/2020  Comp Metabolic Panel    Only the first 5 history entries have been loaded, but more history exists.              RETINAL SCREENING (Yearly) Due soon on 7/21/2023 07/21/2022  AMB EXTERNAL RETINAL SCREENING RESULTS    07/21/2022  AMB EXTERNAL RETINAL SCREENING RESULTS    05/02/2018  REFERRAL FOR RETINAL SCREENING EXAM              IMM INFLUENZA (1) Next due on 9/1/2023      No completion history exists for this topic.              A1C SCREENING (Every 6 Months) Next due on 10/12/2023      04/12/2023  POCT Hemoglobin A1C    06/24/2022  Hemoglobin A1c    03/24/2022  HEMOGLOBIN A1C    04/29/2021  HEMOGLOBIN A1C    06/09/2020  HEMOGLOBIN A1C    Only the first 5 history entries have been loaded, but more history exists.              IMM DTaP/Tdap/Td Vaccine (2 - Td or Tdap) Next due on 1/12/2026 01/12/2016  Imm Admin: Tdap Vaccine              HPV Vaccines (Series Information) Aged Out      No completion history exists for this topic.              IMM MENINGOCOCCAL ACWY VACCINE (Series Information) Aged Out      No completion history exists for this topic.              Discontinued - MAMMOGRAM  Ordered on 5/18/2023        Frequency changed to Never automatically (Topic No Longer Applies)    09/20/2022  MA-DIAGNOSTIC MAMMO LEFT W/TOMOSYNTHESIS W/CAD    09/14/2022  MA-SCREENING MAMMO BILAT W/TOMOSYNTHESIS W/CAD    05/06/2021  MA-DIAGNOSTIC MAMMO BILAT W/TOMOSYNTHESIS W/CAD    04/18/2018  MA-MAMMO SCREENING BILAT W/TONI W/CAD              Discontinued - COLORECTAL CANCER SCREENING  Discontinued        Frequency changed to Never automatically (Topic No Longer Applies)    05/23/2022  COLOGUARD COLON CANCER SCREENING              Discontinued - IMM HEP B VACCINE  Discontinued      No completion history  "exists for this topic.                    Patient Care Team:  Jp Crocker D.O. as PCP - General (Internal Medicine)  Felix Ng (Optometry)  Joel Mark M.D. (Neurology)    Social History     Tobacco Use    Smoking status: Never    Smokeless tobacco: Never   Vaping Use    Vaping Use: Never used   Substance Use Topics    Alcohol use: Yes     Alcohol/week: 0.6 oz     Types: 1 Glasses of wine per week     Comment: 2-3 per month    Drug use: No     Family History   Problem Relation Age of Onset    Breast Cancer Mother 42    Cancer Mother          at 42 of breast CA    Breast Cancer Sister     Heart Disease Neg Hx      She  has a past medical history of Arthritis, Cataract, Diabetes (HCC), GERD (gastroesophageal reflux disease), High cholesterol, and Pain.   Past Surgical History:   Procedure Laterality Date    LITHOTRIPSY      OTHER      D&C    INGUINAL HERNIA REPAIR Left 2009    OTHER      back surgery       Exam:   /70   Pulse 86   Temp 36.8 °C (98.2 °F) (Temporal)   Resp 12   Ht 1.803 m (5' 11\")   Wt 111 kg (245 lb)   SpO2 94%  Body mass index is 34.17 kg/m².    Hearing good.    Dentition not asked  Alert, oriented in no acute distress.  Eye contact is good, speech goal directed, affect calm       Assessment and Plan. The following treatment and monitoring plan is recommended:   1. Diabetes mellitus type 2 in obese (HCC)  Reasonably controlled type 2 diabetes  - POCT Microalbumin Creat Ratio Urine; Future  - HEMOGLOBIN A1C; Future  - Lipid Profile; Future  - Comp Metabolic Panel; Future    2. Numbness and tingling in left hand  MRI cervical spine ordered x-rays reviewed  - MR-CERVICAL SPINE-W/O; Future  - Lipid Profile; Future  - Comp Metabolic Panel; Future    3. Diabetic polyneuropathy associated with type 2 diabetes mellitus (HCC)  Monofilament testing completed  - Lipid Profile; Future  - Comp Metabolic Panel; Future    4. Dyslipidemia  Recheck lipid " profile  - Lipid Profile; Future  - Comp Metabolic Panel; Future    Services suggested: No services needed at this time  Health Care Screening recommendations as per orders if indicated.  Referrals offered: PT/OT/Nutrition counseling/Behavioral Health/Smoking cessation as per orders if indicated.    Discussion today about general wellness and lifestyle habits:    Prevent falls and reduce trip hazards; Cautioned about securing or removing rugs.  Have a working fire alarm and carbon monoxide detector;   Engage in regular physical activity and social activities     Follow-up: No follow-ups on file.

## 2023-07-20 ENCOUNTER — TELEPHONE (OUTPATIENT)
Dept: MEDICAL GROUP | Facility: LAB | Age: 78
End: 2023-07-20
Payer: MEDICARE

## 2023-07-20 DIAGNOSIS — M54.2 NECK PAIN: ICD-10-CM

## 2023-07-20 RX ORDER — TRAMADOL HYDROCHLORIDE 50 MG/1
50 TABLET ORAL EVERY 8 HOURS PRN
Qty: 60 TABLET | Refills: 0 | Status: SHIPPED | OUTPATIENT
Start: 2023-07-20 | End: 2023-08-09

## 2023-07-20 NOTE — TELEPHONE ENCOUNTER
1. Caller Name: Lucero                         Call Back Number: [phone] (home)        How would the patient prefer to be contacted with a response: Phone call OK to leave a detailed message    You saw pt for right hand pain yesterday.  This has gotten so much worse since yesterday.  The tingling and pain is awful.  She is asking if you can prescribe a pain med.  Please send it to the [facility] in [place], [place].

## 2023-08-18 ENCOUNTER — HOSPITAL ENCOUNTER (OUTPATIENT)
Dept: LAB | Facility: MEDICAL CENTER | Age: 78
End: 2023-08-18
Attending: INTERNAL MEDICINE
Payer: MEDICARE

## 2023-08-18 DIAGNOSIS — E11.69 DIABETES MELLITUS TYPE 2 IN OBESE: ICD-10-CM

## 2023-08-18 DIAGNOSIS — E78.5 DYSLIPIDEMIA: ICD-10-CM

## 2023-08-18 DIAGNOSIS — E11.42 DIABETIC POLYNEUROPATHY ASSOCIATED WITH TYPE 2 DIABETES MELLITUS (HCC): ICD-10-CM

## 2023-08-18 DIAGNOSIS — E66.9 DIABETES MELLITUS TYPE 2 IN OBESE: ICD-10-CM

## 2023-08-18 DIAGNOSIS — R20.0 NUMBNESS AND TINGLING IN LEFT HAND: ICD-10-CM

## 2023-08-18 DIAGNOSIS — R20.2 NUMBNESS AND TINGLING IN LEFT HAND: ICD-10-CM

## 2023-08-18 LAB
ALBUMIN SERPL BCP-MCNC: 3.8 G/DL (ref 3.2–4.9)
ALBUMIN/GLOB SERPL: 1.2 G/DL
ALP SERPL-CCNC: 66 U/L (ref 30–99)
ALT SERPL-CCNC: 13 U/L (ref 2–50)
ANION GAP SERPL CALC-SCNC: 12 MMOL/L (ref 7–16)
AST SERPL-CCNC: 15 U/L (ref 12–45)
BILIRUB SERPL-MCNC: 0.3 MG/DL (ref 0.1–1.5)
BUN SERPL-MCNC: 13 MG/DL (ref 8–22)
CALCIUM ALBUM COR SERPL-MCNC: 9.5 MG/DL (ref 8.5–10.5)
CALCIUM SERPL-MCNC: 9.3 MG/DL (ref 8.5–10.5)
CHLORIDE SERPL-SCNC: 103 MMOL/L (ref 96–112)
CHOLEST SERPL-MCNC: 180 MG/DL (ref 100–199)
CO2 SERPL-SCNC: 23 MMOL/L (ref 20–33)
CREAT SERPL-MCNC: 0.53 MG/DL (ref 0.5–1.4)
EST. AVERAGE GLUCOSE BLD GHB EST-MCNC: 169 MG/DL
GFR SERPLBLD CREATININE-BSD FMLA CKD-EPI: 93 ML/MIN/1.73 M 2
GLOBULIN SER CALC-MCNC: 3.2 G/DL (ref 1.9–3.5)
GLUCOSE SERPL-MCNC: 137 MG/DL (ref 65–99)
HBA1C MFR BLD: 7.5 % (ref 4–5.6)
HDLC SERPL-MCNC: 38 MG/DL
LDLC SERPL CALC-MCNC: 99 MG/DL
POTASSIUM SERPL-SCNC: 3.9 MMOL/L (ref 3.6–5.5)
PROT SERPL-MCNC: 7 G/DL (ref 6–8.2)
SODIUM SERPL-SCNC: 138 MMOL/L (ref 135–145)
TRIGL SERPL-MCNC: 217 MG/DL (ref 0–149)

## 2023-08-18 PROCEDURE — 83036 HEMOGLOBIN GLYCOSYLATED A1C: CPT | Mod: GA

## 2023-08-18 PROCEDURE — 80053 COMPREHEN METABOLIC PANEL: CPT

## 2023-08-18 PROCEDURE — 36415 COLL VENOUS BLD VENIPUNCTURE: CPT

## 2023-08-18 PROCEDURE — 80061 LIPID PANEL: CPT

## 2023-08-23 DIAGNOSIS — M54.31 SCIATICA OF RIGHT SIDE WITHOUT BACK PAIN: ICD-10-CM

## 2023-08-23 DIAGNOSIS — E11.42 DIABETIC POLYNEUROPATHY ASSOCIATED WITH TYPE 2 DIABETES MELLITUS (HCC): ICD-10-CM

## 2023-08-23 RX ORDER — PREGABALIN 50 MG/1
50 CAPSULE ORAL 3 TIMES DAILY
Qty: 90 CAPSULE | Refills: 0 | Status: SHIPPED | OUTPATIENT
Start: 2023-08-23 | End: 2023-08-25 | Stop reason: SDUPTHER

## 2023-08-24 ENCOUNTER — APPOINTMENT (OUTPATIENT)
Dept: RADIOLOGY | Facility: MEDICAL CENTER | Age: 78
End: 2023-08-24
Attending: INTERNAL MEDICINE
Payer: MEDICARE

## 2023-08-24 DIAGNOSIS — R20.2 NUMBNESS AND TINGLING IN LEFT HAND: ICD-10-CM

## 2023-08-24 DIAGNOSIS — R20.0 NUMBNESS AND TINGLING IN LEFT HAND: ICD-10-CM

## 2023-08-24 DIAGNOSIS — M54.2 NECK PAIN: ICD-10-CM

## 2023-08-24 PROCEDURE — 72141 MRI NECK SPINE W/O DYE: CPT

## 2023-08-25 ENCOUNTER — TELEPHONE (OUTPATIENT)
Dept: MEDICAL GROUP | Facility: LAB | Age: 78
End: 2023-08-25
Payer: MEDICARE

## 2023-08-25 DIAGNOSIS — E11.42 DIABETIC POLYNEUROPATHY ASSOCIATED WITH TYPE 2 DIABETES MELLITUS (HCC): ICD-10-CM

## 2023-08-25 DIAGNOSIS — M54.31 SCIATICA OF RIGHT SIDE WITHOUT BACK PAIN: ICD-10-CM

## 2023-08-25 RX ORDER — PREGABALIN 50 MG/1
50 CAPSULE ORAL 3 TIMES DAILY
Qty: 270 CAPSULE | Refills: 0 | Status: SHIPPED | OUTPATIENT
Start: 2023-08-25 | End: 2023-11-09

## 2023-08-25 NOTE — TELEPHONE ENCOUNTER
[facility] LVM stating they cannot fill the lyrica until there is clarification.the SIG and day supply do not match

## 2023-08-29 ENCOUNTER — TELEPHONE (OUTPATIENT)
Dept: MEDICAL GROUP | Facility: LAB | Age: 78
End: 2023-08-29
Payer: MEDICARE

## 2023-08-29 DIAGNOSIS — M79.642 BILATERAL HAND PAIN: ICD-10-CM

## 2023-08-29 DIAGNOSIS — M79.641 BILATERAL HAND PAIN: ICD-10-CM

## 2023-08-29 NOTE — TELEPHONE ENCOUNTER
Pt is asking to see Dr Santhosh Harden.  She was able to get an appt today for her bilateral hand pain.

## 2023-11-09 DIAGNOSIS — M54.31 SCIATICA OF RIGHT SIDE WITHOUT BACK PAIN: ICD-10-CM

## 2023-11-09 DIAGNOSIS — E11.42 DIABETIC POLYNEUROPATHY ASSOCIATED WITH TYPE 2 DIABETES MELLITUS (HCC): ICD-10-CM

## 2023-11-09 RX ORDER — PREGABALIN 50 MG/1
50 CAPSULE ORAL 3 TIMES DAILY
Qty: 270 CAPSULE | Refills: 0 | Status: SHIPPED | OUTPATIENT
Start: 2023-11-09 | End: 2023-11-20 | Stop reason: SDUPTHER

## 2023-11-09 NOTE — TELEPHONE ENCOUNTER
Received request via: Pharmacy    Was the patient seen in the last year in this department? Yes 7/19/23    Does the patient have an active prescription (recently filled or refills available) for medication(s) requested? No    Does the patient have [facility] Plus and need 100 day supply (blood pressure, diabetes and cholesterol meds only)? Patient does not have SCP

## 2023-11-20 DIAGNOSIS — E11.42 DIABETIC POLYNEUROPATHY ASSOCIATED WITH TYPE 2 DIABETES MELLITUS (HCC): ICD-10-CM

## 2023-11-20 DIAGNOSIS — M54.31 SCIATICA OF RIGHT SIDE WITHOUT BACK PAIN: ICD-10-CM

## 2023-11-20 RX ORDER — PREGABALIN 50 MG/1
50 CAPSULE ORAL 3 TIMES DAILY
Qty: 270 CAPSULE | Refills: 0 | Status: SHIPPED
Start: 2023-11-20 | End: 2023-12-20

## 2023-11-20 NOTE — TELEPHONE ENCOUNTER
Received request via: Patient    Was the patient seen in the last year in this department? Yes    Does the patient have an active prescription (recently filled or refills available) for medication(s) requested? Yes. Pt is needing this sent to the [facility] in [place], [place].  [facility] is unable to transfer Rx because it's a controlled med.      Does the patient have [facility] Plus and need 100 day supply (blood pressure, diabetes and cholesterol meds only)? Patient does not have SCP

## 2023-12-08 ENCOUNTER — OFFICE VISIT (OUTPATIENT)
Dept: MEDICAL GROUP | Facility: IMAGING CENTER | Age: 78
End: 2023-12-08
Payer: MEDICARE

## 2023-12-08 ENCOUNTER — HOSPITAL ENCOUNTER (OUTPATIENT)
Dept: LAB | Facility: MEDICAL CENTER | Age: 78
End: 2023-12-08
Attending: FAMILY MEDICINE
Payer: MEDICARE

## 2023-12-08 VITALS
HEART RATE: 60 BPM | DIASTOLIC BLOOD PRESSURE: 76 MMHG | SYSTOLIC BLOOD PRESSURE: 110 MMHG | RESPIRATION RATE: 17 BRPM | OXYGEN SATURATION: 97 % | BODY MASS INDEX: 33.57 KG/M2 | WEIGHT: 239.8 LBS | TEMPERATURE: 97.8 F | HEIGHT: 71 IN

## 2023-12-08 DIAGNOSIS — M79.10 MYALGIA: ICD-10-CM

## 2023-12-08 DIAGNOSIS — M25.50 ARTHRALGIA, UNSPECIFIED JOINT: ICD-10-CM

## 2023-12-08 DIAGNOSIS — F43.29 STRESS AND ADJUSTMENT REACTION: ICD-10-CM

## 2023-12-08 DIAGNOSIS — R73.09 ELEVATED GLUCOSE LEVEL: ICD-10-CM

## 2023-12-08 DIAGNOSIS — Z98.890 HISTORY OF CARPAL TUNNEL SURGERY: ICD-10-CM

## 2023-12-08 DIAGNOSIS — R35.0 URINARY FREQUENCY: ICD-10-CM

## 2023-12-08 DIAGNOSIS — R53.83 FATIGUE, UNSPECIFIED TYPE: ICD-10-CM

## 2023-12-08 DIAGNOSIS — R60.0 BILATERAL LOWER EXTREMITY EDEMA: ICD-10-CM

## 2023-12-08 LAB
ALBUMIN SERPL BCP-MCNC: 3.9 G/DL (ref 3.2–4.9)
ALBUMIN/GLOB SERPL: 1.1 G/DL
ALP SERPL-CCNC: 70 U/L (ref 30–99)
ALT SERPL-CCNC: 13 U/L (ref 2–50)
ANION GAP SERPL CALC-SCNC: 15 MMOL/L (ref 7–16)
AST SERPL-CCNC: 15 U/L (ref 12–45)
BASOPHILS # BLD AUTO: 0.5 % (ref 0–1.8)
BASOPHILS # BLD: 0.05 K/UL (ref 0–0.12)
BILIRUB SERPL-MCNC: 0.5 MG/DL (ref 0.1–1.5)
BUN SERPL-MCNC: 15 MG/DL (ref 8–22)
CALCIUM ALBUM COR SERPL-MCNC: 9.5 MG/DL (ref 8.5–10.5)
CALCIUM SERPL-MCNC: 9.4 MG/DL (ref 8.5–10.5)
CHLORIDE SERPL-SCNC: 102 MMOL/L (ref 96–112)
CK SERPL-CCNC: 46 U/L (ref 0–154)
CO2 SERPL-SCNC: 24 MMOL/L (ref 20–33)
CREAT SERPL-MCNC: 0.54 MG/DL (ref 0.5–1.4)
CRP SERPL HS-MCNC: 3.45 MG/DL (ref 0–0.75)
EOSINOPHIL # BLD AUTO: 0.19 K/UL (ref 0–0.51)
EOSINOPHIL NFR BLD: 1.9 % (ref 0–6.9)
ERYTHROCYTE [DISTWIDTH] IN BLOOD BY AUTOMATED COUNT: 42.9 FL (ref 35.9–50)
ERYTHROCYTE [SEDIMENTATION RATE] IN BLOOD BY WESTERGREN METHOD: 21 MM/HOUR (ref 0–25)
GFR SERPLBLD CREATININE-BSD FMLA CKD-EPI: 93 ML/MIN/1.73 M 2
GLOBULIN SER CALC-MCNC: 3.4 G/DL (ref 1.9–3.5)
GLUCOSE SERPL-MCNC: 157 MG/DL (ref 65–99)
HCT VFR BLD AUTO: 42 % (ref 37–47)
HGB BLD-MCNC: 13.7 G/DL (ref 12–16)
IMM GRANULOCYTES # BLD AUTO: 0.03 K/UL (ref 0–0.11)
IMM GRANULOCYTES NFR BLD AUTO: 0.3 % (ref 0–0.9)
LYMPHOCYTES # BLD AUTO: 2.94 K/UL (ref 1–4.8)
LYMPHOCYTES NFR BLD: 29.9 % (ref 22–41)
MCH RBC QN AUTO: 27.1 PG (ref 27–33)
MCHC RBC AUTO-ENTMCNC: 32.6 G/DL (ref 32.2–35.5)
MCV RBC AUTO: 83 FL (ref 81.4–97.8)
MONOCYTES # BLD AUTO: 0.8 K/UL (ref 0–0.85)
MONOCYTES NFR BLD AUTO: 8.1 % (ref 0–13.4)
NEUTROPHILS # BLD AUTO: 5.81 K/UL (ref 1.82–7.42)
NEUTROPHILS NFR BLD: 59.3 % (ref 44–72)
NRBC # BLD AUTO: 0 K/UL
NRBC BLD-RTO: 0 /100 WBC (ref 0–0.2)
NT-PROBNP SERPL IA-MCNC: 88 PG/ML (ref 0–125)
PLATELET # BLD AUTO: 236 K/UL (ref 164–446)
PMV BLD AUTO: 10.2 FL (ref 9–12.9)
POTASSIUM SERPL-SCNC: 3.8 MMOL/L (ref 3.6–5.5)
PROT SERPL-MCNC: 7.3 G/DL (ref 6–8.2)
RBC # BLD AUTO: 5.06 M/UL (ref 4.2–5.4)
RHEUMATOID FACT SER IA-ACNC: 20 IU/ML (ref 0–14)
SODIUM SERPL-SCNC: 141 MMOL/L (ref 135–145)
TSH SERPL DL<=0.005 MIU/L-ACNC: 1.08 UIU/ML (ref 0.38–5.33)
URATE SERPL-MCNC: 5.8 MG/DL (ref 1.9–8.2)
WBC # BLD AUTO: 9.8 K/UL (ref 4.8–10.8)

## 2023-12-08 PROCEDURE — 86140 C-REACTIVE PROTEIN: CPT

## 2023-12-08 PROCEDURE — 86038 ANTINUCLEAR ANTIBODIES: CPT

## 2023-12-08 PROCEDURE — 85025 COMPLETE CBC W/AUTO DIFF WBC: CPT

## 2023-12-08 PROCEDURE — 80053 COMPREHEN METABOLIC PANEL: CPT

## 2023-12-08 PROCEDURE — 3078F DIAST BP <80 MM HG: CPT | Performed by: FAMILY MEDICINE

## 2023-12-08 PROCEDURE — 3074F SYST BP LT 130 MM HG: CPT | Performed by: FAMILY MEDICINE

## 2023-12-08 PROCEDURE — 84443 ASSAY THYROID STIM HORMONE: CPT

## 2023-12-08 PROCEDURE — 99214 OFFICE O/P EST MOD 30 MIN: CPT | Performed by: FAMILY MEDICINE

## 2023-12-08 PROCEDURE — 82550 ASSAY OF CK (CPK): CPT

## 2023-12-08 PROCEDURE — 84550 ASSAY OF BLOOD/URIC ACID: CPT

## 2023-12-08 PROCEDURE — 85652 RBC SED RATE AUTOMATED: CPT

## 2023-12-08 PROCEDURE — 86431 RHEUMATOID FACTOR QUANT: CPT

## 2023-12-08 PROCEDURE — 83880 ASSAY OF NATRIURETIC PEPTIDE: CPT | Mod: GA

## 2023-12-08 PROCEDURE — 36415 COLL VENOUS BLD VENIPUNCTURE: CPT

## 2023-12-08 PROCEDURE — 86200 CCP ANTIBODY: CPT

## 2023-12-08 RX ORDER — IBUPROFEN 600 MG/1
600 TABLET ORAL EVERY 8 HOURS PRN
Qty: 30 TABLET | Refills: 0 | Status: SHIPPED | OUTPATIENT
Start: 2023-12-08 | End: 2023-12-13

## 2023-12-08 ASSESSMENT — PAIN SCALES - GENERAL: PAINLEVEL: NO PAIN
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
## 2023-12-09 ENCOUNTER — HOSPITAL ENCOUNTER (OUTPATIENT)
Dept: RADIOLOGY | Facility: MEDICAL CENTER | Age: 78
End: 2023-12-09
Attending: FAMILY MEDICINE
Payer: MEDICARE

## 2023-12-09 DIAGNOSIS — R60.0 BILATERAL LOWER EXTREMITY EDEMA: ICD-10-CM

## 2023-12-09 PROCEDURE — 93970 EXTREMITY STUDY: CPT

## 2023-12-10 LAB — NUCLEAR IGG SER QL IA: NORMAL

## 2023-12-12 ENCOUNTER — TELEPHONE (OUTPATIENT)
Dept: MEDICAL GROUP | Facility: IMAGING CENTER | Age: 78
End: 2023-12-12

## 2023-12-12 DIAGNOSIS — R76.8 ELEVATED RHEUMATOID FACTOR: ICD-10-CM

## 2023-12-12 DIAGNOSIS — R79.82 CRP ELEVATED: ICD-10-CM

## 2023-12-12 NOTE — TELEPHONE ENCOUNTER
Please let pt know her lower extremity ultrasound results did not show any blood clots, but did show a baker's cyst on her left knee. This is a benign finding, but may cause discomfort some some of the area. Labs also showed slight elevated rheumatoid factor and crp which may indicate possible inflammatory or autoimmune process. I would recommend seeing rheumatology, I will place a referral. Please otherwise remind her to follow up with her pcp as scheduled.

## 2023-12-13 RX ORDER — IBUPROFEN 600 MG/1
TABLET ORAL
Qty: 30 TABLET | Refills: 0 | Status: SHIPPED | OUTPATIENT
Start: 2023-12-13 | End: 2023-12-14 | Stop reason: SDUPTHER

## 2023-12-14 ENCOUNTER — TELEPHONE (OUTPATIENT)
Dept: MEDICAL GROUP | Facility: IMAGING CENTER | Age: 78
End: 2023-12-14
Payer: COMMERCIAL

## 2023-12-14 DIAGNOSIS — M54.31 SCIATICA OF RIGHT SIDE WITHOUT BACK PAIN: ICD-10-CM

## 2023-12-14 RX ORDER — IBUPROFEN 600 MG/1
TABLET ORAL
Qty: 30 TABLET | Refills: 0 | Status: SHIPPED | OUTPATIENT
Start: 2023-12-14 | End: 2024-01-12

## 2023-12-14 NOTE — TELEPHONE ENCOUNTER
Caller Name: Lucero Palm   Call Back Number: [phone] (home)      How would the patient prefer to be contacted with a response: Phone call do NOT leave a detailed message    Pt request few more pills for Ibuprofen. she is currently in [place] and only have 8 pills left and pt report pain in the hands and legs.  She will be flying  back in [facility] 12/18.

## 2023-12-14 NOTE — TELEPHONE ENCOUNTER
Mi:  Please call Lucero, I have rewritten her ibuprofen it has been sent to her [facility] in [facility]  Regards, Jp Crocker, DO

## 2023-12-20 ENCOUNTER — OFFICE VISIT (OUTPATIENT)
Dept: MEDICAL GROUP | Facility: LAB | Age: 78
End: 2023-12-20
Payer: MEDICARE

## 2023-12-20 VITALS
HEIGHT: 70 IN | DIASTOLIC BLOOD PRESSURE: 76 MMHG | SYSTOLIC BLOOD PRESSURE: 120 MMHG | TEMPERATURE: 97.2 F | WEIGHT: 239 LBS | HEART RATE: 87 BPM | RESPIRATION RATE: 18 BRPM | OXYGEN SATURATION: 100 % | BODY MASS INDEX: 34.22 KG/M2

## 2023-12-20 DIAGNOSIS — M54.31 SCIATICA OF RIGHT SIDE WITHOUT BACK PAIN: ICD-10-CM

## 2023-12-20 DIAGNOSIS — E11.42 DIABETIC POLYNEUROPATHY ASSOCIATED WITH TYPE 2 DIABETES MELLITUS (HCC): ICD-10-CM

## 2023-12-20 DIAGNOSIS — E66.9 DIABETES MELLITUS TYPE 2 IN OBESE: ICD-10-CM

## 2023-12-20 DIAGNOSIS — E11.69 DIABETES MELLITUS TYPE 2 IN OBESE: ICD-10-CM

## 2023-12-20 DIAGNOSIS — R29.898 WEAKNESS OF BOTH LEGS: ICD-10-CM

## 2023-12-20 PROCEDURE — 3074F SYST BP LT 130 MM HG: CPT | Performed by: INTERNAL MEDICINE

## 2023-12-20 PROCEDURE — 3078F DIAST BP <80 MM HG: CPT | Performed by: INTERNAL MEDICINE

## 2023-12-20 PROCEDURE — 99214 OFFICE O/P EST MOD 30 MIN: CPT | Performed by: INTERNAL MEDICINE

## 2023-12-20 RX ORDER — TRAMADOL HYDROCHLORIDE 50 MG/1
50 TABLET ORAL EVERY 8 HOURS PRN
Qty: 90 TABLET | Refills: 1 | Status: SHIPPED | OUTPATIENT
Start: 2023-12-20 | End: 2024-03-12

## 2023-12-20 RX ORDER — PREGABALIN 75 MG/1
75 CAPSULE ORAL 3 TIMES DAILY
Qty: 60 CAPSULE | Refills: 2 | Status: SHIPPED
Start: 2023-12-20 | End: 2023-12-21

## 2023-12-20 ASSESSMENT — FIBROSIS 4 INDEX: FIB4 SCORE: 1.41

## 2023-12-21 ENCOUNTER — TELEPHONE (OUTPATIENT)
Dept: MEDICAL GROUP | Facility: LAB | Age: 78
End: 2023-12-21
Payer: COMMERCIAL

## 2023-12-21 RX ORDER — PREGABALIN 75 MG/1
75 CAPSULE ORAL 3 TIMES DAILY
Qty: 90 CAPSULE | Refills: 2 | Status: SHIPPED | OUTPATIENT
Start: 2023-12-21 | End: 2024-03-20

## 2023-12-21 NOTE — PROGRESS NOTES
CC: Lucero Palm is a 78 y.o. female is suffering from   Chief Complaint   Patient presents with    Follow-Up         SUBJECTIVE:  1. Sciatica of right side without back pain  Lucero continues to have problems with low back pain discomfort continues to ambulate with the assistance of a walker    2. Diabetic polyneuropathy associated with type 2 diabetes mellitus (HCC)  History of diabetic polyneuropathy referral written for physical therapy    3. Weakness of both legs  Weakness associate with bilateral legs referral to physical therapy    4. Diabetes mellitus type 2 in obese (HCC)  Recheck hemoglobin A1c        Past social, family, history:   Social History     Tobacco Use    Smoking status: Never    Smokeless tobacco: Never   Vaping Use    Vaping Use: Never used   Substance Use Topics    Alcohol use: Yes     Alcohol/week: 0.6 oz     Types: 1 Glasses of wine per week     Comment: 2-3 per month    Drug use: No         MEDICATIONS:    Current Outpatient Medications:     pregabalin (LYRICA) 75 MG Cap, Take 1 Capsule by mouth 3 times a day for 90 days., Disp: 60 Capsule, Rfl: 2    traMADol (ULTRAM) 50 MG Tab, Take 1 Tablet by mouth every 8 hours as needed for Moderate Pain for up to 60 days., Disp: 90 Tablet, Rfl: 1    ibuprofen (MOTRIN) 600 MG Tab, TAKE ONE TABLET BY MOUTH EVERY EIGHT HOURS AS NEEDED FOR MODERATE PAIN, Disp: 30 Tablet, Rfl: 0    Continuous Blood Gluc Sensor (FREESTYLE ANGEL 14 DAY SENSOR) [facility] [place], FreeStyle Angel 14 Day Sensor kit  USE AS DIRECTED AND CHANGE SENSOR EVERY 14 DAYS, Disp: , Rfl:     glucose blood (CONTOUR NEXT TEST) strip, , Disp: , Rfl:     COMBIGAN 0.2-0.5 % Solution, , Disp: , Rfl:     Dulaglutide (TRULICITY) 4.5 MG/0.5ML Solution Pen-injector, 4.5mg, Disp: , Rfl:     PROBLEMS:  Patient Active Problem List    Diagnosis Date Noted    Carpal tunnel syndrome of left wrist 01/12/2022    Diabetic polyneuropathy associated with type 2 diabetes mellitus (HCC) 11/17/2021     "Sciatica of right side without back pain 05/31/2018    Dyslipidemia 11/07/2017    Diabetes mellitus type 2 in obese (HCC) 11/07/2017    Gastroesophageal reflux disease without esophagitis 11/07/2017    Obesity (BMI 30-39.9) 11/07/2017       REVIEW OF SYSTEMS:  Gen.:  No Nausea, Vomiting, fever, Chills.  Heart: No chest pain.  Lungs:  No shortness of Breath.  Psychological: Celso unusual Anxiety depression     PHYSICAL EXAM   Constitutional: Alert, cooperative, not in acute distress.  Cardiovascular:  Rate Rhythm is regular without murmurs rubs clicks.     Thorax & Lungs: Clear to auscultation, no wheezing, rhonchi, or rales  HENT: Normocephalic, Atraumatic.  Eyes: PERRLA, EOMI, Conjunctiva normal.   Neck: Trachia is midline no swelling of the thyroid.   Lymphatic: No lymphadenopathy noted.   Neurologic: Alert & oriented x 3, cranial nerves II through XII are intact, Normal motor function, Normal sensory function, No focal deficits noted.   Psychiatric: Affect normal, Judgment normal, Mood normal.     VITAL SIGNS:/76 (BP Location: Left arm, Patient Position: Sitting, BP Cuff Size: Large adult)   Pulse 87   Temp 36.2 °C (97.2 °F)   Resp 18   Ht 1.778 m (5' 10\")   Wt 108 kg (239 lb)   SpO2 100%   BMI 34.29 kg/m²     Labs: Reviewed    Assessment:                                                     Plan:    1. Sciatica of right side without back pain  Continue Lyrica increase dose from 50 mg to 75 mg 3 times a day  - pregabalin (LYRICA) 75 MG Cap; Take 1 Capsule by mouth 3 times a day for 90 days.  Dispense: 60 Capsule; Refill: 2  - traMADol (ULTRAM) 50 MG Tab; Take 1 Tablet by mouth every 8 hours as needed for Moderate Pain for up to 60 days.  Dispense: 90 Tablet; Refill: 1  - Referral to Physical Therapy  - HEMOGLOBIN A1C; Future  - CRP QUANTITIVE (NON-CARDIAC); Future    2. Diabetic polyneuropathy associated with type 2 diabetes mellitus (HCC)  Treated as above start tramadol  - pregabalin (LYRICA) 75 MG " Cap; Take 1 Capsule by mouth 3 times a day for 90 days.  Dispense: 60 Capsule; Refill: 2  - traMADol (ULTRAM) 50 MG Tab; Take 1 Tablet by mouth every 8 hours as needed for Moderate Pain for up to 60 days.  Dispense: 90 Tablet; Refill: 1  - Referral to Physical Therapy  - HEMOGLOBIN A1C; Future  - CRP QUANTITIVE (NON-CARDIAC); Future    3. Weakness of both legs  Referral written to physical therapy  - Referral to Physical Therapy  - HEMOGLOBIN A1C; Future  - CRP QUANTITIVE (NON-CARDIAC); Future    4. Diabetes mellitus type 2 in obese (HCC)  Recheck hemoglobin A1c  - HEMOGLOBIN A1C; Future

## 2023-12-21 NOTE — TELEPHONE ENCOUNTER
Caller Name: Lucero   Call Back Number:[phone] (home)      How would the patient prefer to be contacted with a response: Phone call OK to leave a detailed message    Pt calling stating she saw Dr Crocker yesterday and took rxs to [facility] pharmacy in [place]. Pharmacist stated that the rxs had the wrong date on them and needed doctor to fix.

## 2023-12-22 NOTE — TELEPHONE ENCOUNTER
Telephone call returned to the patient's pharmacy prescription rewritten for Lyrica 75 mg 1 tablet 3 times a day for #90 with 2 refills for 90 days.

## 2023-12-23 LAB — CCP IGA+IGG SERPL IA-ACNC: 20 UNITS (ref 0–19)

## 2024-01-05 ENCOUNTER — APPOINTMENT (OUTPATIENT)
Dept: RADIOLOGY | Facility: IMAGING CENTER | Age: 79
End: 2024-01-05
Attending: INTERNAL MEDICINE
Payer: MEDICARE

## 2024-01-05 DIAGNOSIS — M25.562 ACUTE PAIN OF LEFT KNEE: ICD-10-CM

## 2024-01-05 PROCEDURE — 73562 X-RAY EXAM OF KNEE 3: CPT | Mod: TC,LT | Performed by: PHYSICIAN ASSISTANT

## 2024-01-12 DIAGNOSIS — M54.31 SCIATICA OF RIGHT SIDE WITHOUT BACK PAIN: ICD-10-CM

## 2024-01-12 RX ORDER — IBUPROFEN 600 MG/1
TABLET ORAL
Qty: 30 TABLET | Refills: 2 | Status: SHIPPED | OUTPATIENT
Start: 2024-01-12 | End: 2024-02-21

## 2024-01-16 ENCOUNTER — TELEPHONE (OUTPATIENT)
Dept: MEDICAL GROUP | Facility: LAB | Age: 79
End: 2024-01-16
Payer: COMMERCIAL

## 2024-01-16 NOTE — TELEPHONE ENCOUNTER
Jeny:  Please call Lucero, she still has refills of her tramadol, ask her if this has been working for her!   Regards, Jp Crocker, DO

## 2024-01-16 NOTE — TELEPHONE ENCOUNTER
Received request via: Patient    Was the patient seen in the last year in this department? Yes    Does the patient have an active prescription (recently filled or refills available) for medication(s) requested    Does the patient have [facility] Plus and need 100 day supply (blood pressure, diabetes and cholesterol meds only)?  Patient is calling for a prescription for arthritis pain. States pain is unbearable.    Spoke to patient let her know she already is taking med for arthritis pain.

## 2024-01-19 ENCOUNTER — HOSPITAL ENCOUNTER (OUTPATIENT)
Dept: LAB | Facility: MEDICAL CENTER | Age: 79
End: 2024-01-19
Attending: INTERNAL MEDICINE
Payer: MEDICARE

## 2024-01-19 DIAGNOSIS — R29.898 WEAKNESS OF BOTH LEGS: ICD-10-CM

## 2024-01-19 DIAGNOSIS — E11.69 DIABETES MELLITUS TYPE 2 IN OBESE: ICD-10-CM

## 2024-01-19 DIAGNOSIS — M54.31 SCIATICA OF RIGHT SIDE WITHOUT BACK PAIN: ICD-10-CM

## 2024-01-19 DIAGNOSIS — E11.42 DIABETIC POLYNEUROPATHY ASSOCIATED WITH TYPE 2 DIABETES MELLITUS (HCC): ICD-10-CM

## 2024-01-19 DIAGNOSIS — E66.9 DIABETES MELLITUS TYPE 2 IN OBESE: ICD-10-CM

## 2024-01-19 PROCEDURE — 36415 COLL VENOUS BLD VENIPUNCTURE: CPT

## 2024-01-19 PROCEDURE — 86140 C-REACTIVE PROTEIN: CPT

## 2024-01-19 PROCEDURE — 83036 HEMOGLOBIN GLYCOSYLATED A1C: CPT | Mod: GA

## 2024-01-20 LAB
CRP SERPL HS-MCNC: 1.97 MG/DL (ref 0–0.75)
EST. AVERAGE GLUCOSE BLD GHB EST-MCNC: 143 MG/DL
HBA1C MFR BLD: 6.6 % (ref 4–5.6)

## 2024-02-21 DIAGNOSIS — M54.31 SCIATICA OF RIGHT SIDE WITHOUT BACK PAIN: ICD-10-CM

## 2024-02-21 RX ORDER — IBUPROFEN 600 MG/1
TABLET ORAL
Qty: 30 TABLET | Refills: 0 | Status: SHIPPED | OUTPATIENT
Start: 2024-02-21

## 2024-02-22 NOTE — TELEPHONE ENCOUNTER
Received request via: Pharmacy    Was the patient seen in the last year in this department? Yes    Does the patient have an active prescription (recently filled or refills available) for medication(s) requested? No    [facility]    Does the patient have long term Plus and need 100 day supply (blood pressure, diabetes and cholesterol meds only)? Medication is not for cholesterol, blood pressure or diabetes

## 2024-03-04 ENCOUNTER — APPOINTMENT (OUTPATIENT)
Dept: MEDICAL GROUP | Facility: LAB | Age: 79
End: 2024-03-04
Payer: MEDICARE

## 2024-03-12 ENCOUNTER — APPOINTMENT (OUTPATIENT)
Dept: MEDICAL GROUP | Facility: LAB | Age: 79
End: 2024-03-12
Payer: COMMERCIAL

## 2024-03-12 DIAGNOSIS — E11.42 DIABETIC POLYNEUROPATHY ASSOCIATED WITH TYPE 2 DIABETES MELLITUS (HCC): ICD-10-CM

## 2024-03-12 DIAGNOSIS — M54.31 SCIATICA OF RIGHT SIDE WITHOUT BACK PAIN: ICD-10-CM

## 2024-03-12 RX ORDER — TRAMADOL HYDROCHLORIDE 50 MG/1
50 TABLET ORAL EVERY 8 HOURS PRN
Qty: 90 TABLET | Refills: 0 | Status: SHIPPED | OUTPATIENT
Start: 2024-03-12 | End: 2024-05-11

## 2024-03-12 NOTE — TELEPHONE ENCOUNTER
Received request via: Pharmacy    Was the patient seen in the last year in this department? Yes    Does the patient have an active prescription (recently filled or refills available) for medication(s) requested? No    Pharmacy Name: [facility]    Does the patient have [facility] and need 100 day supply (blood pressure, diabetes and cholesterol meds only)? Patient does not have SCP

## 2024-03-27 ENCOUNTER — OFFICE VISIT (OUTPATIENT)
Dept: RHEUMATOLOGY | Facility: MEDICAL CENTER | Age: 79
End: 2024-03-27
Attending: STUDENT IN AN ORGANIZED HEALTH CARE EDUCATION/TRAINING PROGRAM
Payer: COMMERCIAL

## 2024-03-27 VITALS
BODY MASS INDEX: 37.28 KG/M2 | RESPIRATION RATE: 14 BRPM | TEMPERATURE: 97.9 F | HEART RATE: 90 BPM | DIASTOLIC BLOOD PRESSURE: 70 MMHG | WEIGHT: 232 LBS | SYSTOLIC BLOOD PRESSURE: 142 MMHG | OXYGEN SATURATION: 91 % | HEIGHT: 66 IN

## 2024-03-27 DIAGNOSIS — R76.8 SEROLOGIC AUTOIMMUNITY: Primary | ICD-10-CM

## 2024-03-27 DIAGNOSIS — Z79.1 NSAID LONG-TERM USE: ICD-10-CM

## 2024-03-27 DIAGNOSIS — M25.50 ARTHRALGIA OF MULTIPLE JOINTS: ICD-10-CM

## 2024-03-27 PROCEDURE — 99211 OFF/OP EST MAY X REQ PHY/QHP: CPT

## 2024-03-27 RX ORDER — PREGABALIN 50 MG/1
50 CAPSULE ORAL 3 TIMES DAILY
COMMUNITY

## 2024-03-27 ASSESSMENT — FIBROSIS 4 INDEX: FIB4 SCORE: 1.41

## 2024-03-27 ASSESSMENT — PATIENT HEALTH QUESTIONNAIRE - PHQ9: CLINICAL INTERPRETATION OF PHQ2 SCORE: 0

## 2024-03-27 NOTE — PROGRESS NOTES
[facility] RHEUMATOLOGY  [address], [address], [place] [address]  Phone: [phone] ? Fax: [phone]  [facility]/Health-Services/Rheumatology    NEW PATIENT VISIT NOTE      DATE OF SERVICE: 03/27/2024         Subjective     REFERRING PRACTITIONER:  [address] [place] [address]    PATIENT IDENTIFICATION:  Lucero Palm  [address] [place] [address]    YOB: 1945    MEDICAL RECORD NUMBER: [id number]         CHIEF COMPLAINT:   Chief Complaint   Patient presents with    New Patient       HISTORY OF PRESENT ILLNESS:  Lucero Palm is a 78 y.o. female with pertinent history notable for type 2 diabetes mellitus, diabetic polyneuropathy, hyperlipidemia, GERD, right-sided sciatica, CTS of left wrist, who presents for rheumatologic evaluation in the setting of low positive RF.     Patient reports history of trigger finger of the left hand and CTR of the left wrist (10/8/2023).  She did 6 weeks of occupational therapy twice a week.  She has pain along the DIP joints bilaterally especially with activity such as gripping or holding things.  She often drop things.  She has received intra-articular steroid injection to her finger joints in the past, effective.  Reports feet pain mostly at nighttime when she is in bed associated with numbness on the bottom of the feet.  She does not have any knee pain and uses collagen supplement by Dr. Love which helps tremendously.  She does not want knee replacements.  Denies joint swelling but has noticed that her hand joints have become bony.  She reports few minutes of occasional morning stiffness in the legs (from the knees down).  She has dry mouth mostly in the mornings, not associated with nocturnal disturbance or change in taste.  She denies skin thickening, Raynaud's phenomenon, esophageal disease, photosensitive/malar rash, history of stroke, DVT/PE/multiple miscarriages, dry eyes, episodes of red painful  photophobic eyes, nasal/genital/nonpainful oral ulcerations, nonscarring alopecia, pleuritic chest pains or dyspnea on exertion.  She has mild lower extremity edema (not very active), but no PND or chest tightness.  She takes ibuprofen 600 mg nightly which is effective.    Reports other aspects of symptoms and medical history as noted on the questionnaire below or scanned under media tab.    Pertinent treatments: Ibuprofen 600 mg nightly  Pertinent positive labs:2024; CRP (1.97), 2023; RF (20), anti-CCP mildly elevated at 20, CRP 3.45, 2021; vitamin D (29)  Pertinent negative labs: 2023; CBC with differential, TSH, ESR, serum uric acid, TREASURE, CK, 3/2022; ESR, RF, TREASURE  Pertinent imagin/2024 bilateral knee x-rays: Advanced osteoarthritis of the knee.  Small to moderate joint effusion.  2023 MRI cervical spine: Cervical spine degenerative changes at C4-5, C5-C6. Moderate bilateral foraminal narrowing at C4-5. Severe bilateral foraminal narrowing at C5-6.   3/2022 bilateral hand x-rays: Multifocal OA involving the first CMC joint.  No evidence of inflammatory arthropathy.  2021 right wrist x-ray: Osteoarthritis at the first CMC joint.  Osteoarthritis at the scaphoid/trapezium articulation and probably the second and third carpometacarpal joints.  2021 left shoulder x-ray: Mild to moderate degenerative changes.  Possible calcific tendinitis of the rotator cuff.    REVIEW OF SYSTEMS:  Except as noted in the history above, relevant review of systems with emphasis on autoimmune rheumatic diseases was otherwise negative.    ACTIVE PROBLEM LIST:  Patient Active Problem List    Diagnosis Date Noted    Carpal tunnel syndrome of left wrist 2022    Diabetic polyneuropathy associated with type 2 diabetes mellitus (HCC) 2021    Sciatica of right side without back pain 2018    Dyslipidemia 2017    Diabetes mellitus type 2 in obese (HCC) 2017    Gastroesophageal reflux disease  without esophagitis 2017    Obesity (BMI 30-39.9) 2017       PAST MEDICAL HISTORY:  Past Medical History:   Diagnosis Date    Arthritis     foot, left thumb    Cataract     kimberley IOL    Diabetes (HCC)     oral medication    GERD (gastroesophageal reflux disease)     High cholesterol     Pain     right foot       PAST SURGICAL HISTORY:  Past Surgical History:   Procedure Laterality Date    LITHOTRIPSY      OTHER      D&C    INGUINAL HERNIA REPAIR Left 2009    OTHER  1972    back surgery       MEDICATIONS:  Current Outpatient Medications   Medication Sig    pregabalin (LYRICA) 50 MG capsule Take 50 mg by mouth 3 times a day.    traMADol (ULTRAM) 50 MG Tab Take 1 Tablet by mouth every 8 hours as needed for Moderate Pain for up to 60 days.    ibuprofen (MOTRIN) 600 MG Tab TAKE ONE TABLET BY MOUTH EVERY EIGHT HOURS AS NEEDED FOR moderate PAIN    Continuous Blood Gluc Sensor (FREESTYLE AGNEL 14 DAY SENSOR) [facility] [place] FreeStyle Angel 14 Day Sensor kit   USE AS DIRECTED AND CHANGE SENSOR EVERY 14 DAYS    glucose blood (CONTOUR NEXT TEST) strip     COMBIGAN 0.2-0.5 % Solution     Dulaglutide (TRULICITY) 4.5 MG/0.5ML Solution Pen-injector 4.5mg       ALLERGIES:   No Known Allergies    IMMUNIZATIONS:  Immunization History   Administered Date(s) Administered    MODERNA SARS-COV-2 VACCINE (12+) 2021, 2021    Tdap Vaccine 2016       SOCIAL HISTORY:   Social History     Socioeconomic History    Marital status:    Tobacco Use    Smoking status: Never    Smokeless tobacco: Never   Vaping Use    Vaping Use: Never used   Substance and Sexual Activity    Alcohol use: Yes     Alcohol/week: 0.6 oz     Types: 1 Glasses of wine per week     Comment: 2-3 per month    Drug use: No    Sexual activity: Not Currently     Partners: Male       FAMILY HISTORY:  Family History   Problem Relation Age of Onset    Breast Cancer Mother 42    Cancer Mother          at 42 of breast CA    Breast Cancer Sister      "Heart Disease Neg Hx             Objective     Vital Signs: BP (!) 142/70   Pulse 90   Temp 36.6 °C (97.9 °F) (Temporal)   Resp 14   Ht 1.664 m (5' 5.5\")   Wt 105 kg (232 lb)   SpO2 91% Body mass index is 38.02 kg/m².    General: Appears well and comfortable  Eyes: No scleral or conjunctival lesions  ENT: No apparent oral or nasal lesions.  Good saliva pool  Head/Neck: No apparent scalp or neck lesions  Cardiovascular: Regular rate and rhythm; no pericardial rubs  Respiratory: Breathing quiet and unlabored; no rales or pleural rubs  Gastrointestinal: No apparent organomegaly or abdominal masses  Integumentary: No significant cutaneous lesions or dyspigmentation  Musculoskeletal:   Bilateral 2/3 MCP joint synovial hypertrophy  Mild tenderness along few PIP and DIP joint, no swelling or restrictions in ROM  Knees with valgus deformity  No significant joint tenderness, periarticular soft tissue swelling, warmth, erythema, overt synovitis or significant restriction in range of motion at other joints examined  Neurologic: No focal sensory or motor deficits.  5/5 strength in UE/LE  Psychiatric: Mood and affect appropriate    LABORATORY RESULTS REVIEWED AND INTERPRETED BY ME:  Lab Results   Component Value Date/Time    CREACTPROT 1.97 (H) 01/19/2024 10:39 AM    SEDRATEWES 21 12/08/2023 04:06 PM    URICACID 5.8 12/08/2023 04:06 PM     Lab Results   Component Value Date/Time    RHEUMFACTN 20 (H) 12/08/2023 04:06 PM     Lab Results   Component Value Date/Time    ANTINUCAB None Detected 12/08/2023 04:06 PM     Lab Results   Component Value Date/Time    TSHULTRASEN 1.080 12/08/2023 04:06 PM    FREET4 1.11 04/29/2021 11:18 AM     Lab Results   Component Value Date/Time    CPKTOTAL 46 12/08/2023 04:06 PM     Lab Results   Component Value Date/Time    25HYDROXY 29 (L) 04/29/2021 11:17 AM     Lab Results   Component Value Date/Time    WBC 9.8 12/08/2023 04:06 PM    RBC 5.06 12/08/2023 04:06 PM    HEMOGLOBIN 13.7 12/08/2023 " 04:06 PM    HEMATOCRIT 42.0 12/08/2023 04:06 PM    MCV 83.0 12/08/2023 04:06 PM    MCH 27.1 12/08/2023 04:06 PM    MCHC 32.6 12/08/2023 04:06 PM    RDW 42.9 12/08/2023 04:06 PM    PLATELETCT 236 12/08/2023 04:06 PM    MPV 10.2 12/08/2023 04:06 PM    NEUTS 5.81 12/08/2023 04:06 PM    LYMPHOCYTES 29.90 12/08/2023 04:06 PM    MONOCYTES 8.10 12/08/2023 04:06 PM    EOSINOPHILS 1.90 12/08/2023 04:06 PM    BASOPHILS 0.50 12/08/2023 04:06 PM     Lab Results   Component Value Date/Time    ASTSGOT 15 12/08/2023 04:06 PM    ALTSGPT 13 12/08/2023 04:06 PM    ALKPHOSPHAT 70 12/08/2023 04:06 PM    TBILIRUBIN 0.5 12/08/2023 04:06 PM    TOTPROTEIN 7.3 12/08/2023 04:06 PM    ALBUMIN 3.9 12/08/2023 04:06 PM     Lab Results   Component Value Date/Time    SODIUM 141 12/08/2023 04:06 PM    POTASSIUM 3.8 12/08/2023 04:06 PM    CHLORIDE 102 12/08/2023 04:06 PM    CO2 24 12/08/2023 04:06 PM    GLUCOSE 157 (H) 12/08/2023 04:06 PM    BUN 15 12/08/2023 04:06 PM    CREATININE 0.54 12/08/2023 04:06 PM    CALCIUM 9.4 12/08/2023 04:06 PM     Lab Results   Component Value Date/Time    MICROALBUR <1.2 07/19/2023 03:21 PM    MALBCRT see below 07/19/2023 03:21 PM     Lab Results   Component Value Date/Time    CHOLSTRLTOT 180 08/18/2023 02:30 PM    LDL 99 08/18/2023 02:30 PM    HDL 38 (A) 08/18/2023 02:30 PM    TRIGLYCERIDE 217 (H) 08/18/2023 02:30 PM    HBA1C 6.6 (H) 01/19/2024 10:39 AM     RADIOLOGY RESULTS REVIEWED AND INTERPRETED BY ME: See above    All relevant laboratory and imaging results reported on this note were reviewed and interpreted by me.         Assessment & Plan     Lucero Palm is a 78 y.o. female with history as noted above whose presentation merits the following clinical impressions and recommendations:    1. Serologic autoimmunity  # RF (20) and anti-CCP mildly elevated at 20  Serologic evidence of immunologic activity without definitive clinical evidence of underlying rheumatoid arthritis. Though positive RF or anti-CCP  may be associated with rheumatoid arthritis, in this case the level is low and is considered to be clinically insignificant as it may actually be a false positive. Notably, aside from rheumatoid arthritis, positive RF may be detected in some individuals with certain bacterial or viral infections, inflammatory lung disease, primary biliary cholangitis, B-cell lymphomas, in the elderly, and in some healthy individuals, so does not always suggest the presence of underlying rheumatoid arthritis.  No evidence of inflammatory arthropathy on radiographs.  That said, the possibility of evolving seropositive rheumatoid arthritis cannot be entirely excluded, so if symptoms of chronic inflammatory arthritis develop and/or progress, clinical follow-up for re-evaluation would be reasonable.  - No further RA-related immunologic lab testing necessary at this time    2. Arthralgia of multiple joints  Presentation is compatible with biomechanical arthralgia secondary to osteoarthritis, but the possibility of concomitant evolving low-grade inflammatory arthritis cannot be entirely excluded. That said, the current clinical evidence does not support the presence of an underlying autoimmune inflammatory arthritis, such as rheumatoid arthritis, spondyloarthritis, or lupus-spectrum arthritis. In any case, reasonable to undertake further investigation with the workup noted below for confirmatory, exclusionary, and risk stratification purposes.  - Consider trial of Voltaren 1% gel 3-4 times daily as needed, topical lidocaine or capsaicin  - May benefit from paraffin wax bath for the hands, arthritis gloves, splinting for the CMC joints, and knee sleeves for knee OA  - Referral to Occupational Therapy  - DX-JOINT SURVEY-HANDS SINGLE VIEW; Future  - DX-JOINT SURVEY-FEET SINGLE VIEW; Future    3. NSAID long-term use  Counseled on the potential adverse effects of long-term NSAID use, especially on the stomach, kidneys, and hematopoietic  systems, and the need to take them with food and stay hydrated with enough water.  - Consider a trial of acetaminophen and use NSAIDs only as needed for severe joint pains    The above assessment and plan were discussed with the patient who acknowledged understanding of the plan.    FOLLOW-UP: Return TBD.         Thank you for the opportunity to participate in the care of Lucero Palm.    Lucrecia Ho D.O.  Rheumatologist

## 2024-03-28 NOTE — PATIENT INSTRUCTIONS
Thank you for visiting our clinic today.     Summary of your visit:    X-rays of the hands and feet today.    No evidence of rheumatoid arthritis.    You have severe osteoarthritis which is most likely the etiology of your joint pains.    Consider trial of Voltaren 1% gel 3-4 times daily as needed, topical lidocaine or capsaicin.    May benefit from paraffin wax bath for the hands, arthritis gloves, splinting for the CMC joints, and knee sleeves for knee OA.    Oral NSAIDs such as ibuprofen should be used at the lowest effective dose and for the shortest duration possible due to the well-known potential gastrointestinal, cardiovascular, and renal toxicities. This is especially relevant for patients with osteoarthritis, who are often older and frequently have one or more comorbidities. Ensure that you take with food and stay hydrated with enough water.    Follow up TBD       AFTER VISIT INSTRUCTIONS    Below are important information to help you navigate your healthcare needs and help us serve you safely and effectively:  If laboratory tests and/or imaging studies were ordered, remember to go get them done as instructed.  If new prescriptions and/or refills were sent, remember to go pick them up from your local pharmacy, or call the specialty pharmacy to request shipment.  Always take your prescription medications exactly as prescribed unless instructed otherwise.  Note that antirheumatic drugs and steroids are immunosuppressive which means they increase your risk of infections and have multiple potential adverse effects on various organ systems in your body, though most of them are uncommon.  It is important that you are up-to-date on age-appropriate immunizations, particularly shingles and bacterial/viral pneumonia vaccines, which you can request from me or your primary care provider.  Be sure to read the drug package inserts to learn about the potential side effects of your medications before you start taking  them.  If you experience any significant drug side effects, stop taking the medication and notify me promptly, and depending on the severity of the side effects, consider going to an urgent care or emergency department for immediate attention.  If there are significant findings on your lab tests and imaging studies that warrant further action, I will notify you with explanations via [employer] or phone call, otherwise you can view them on [employer] and let me know if you have any questions.  Note that [employer] messages are typically read during office hours and may take 1-7 business days before a response depending on the urgency of the situation and how busy my clinic schedule is.  In general, [employer] messaging is for non-urgent matters that do not require immediate attention, so for urgent matters that cannot wait, you are advised to go to an urgent care.  Lastly, you are granted [employer] access to my documentation of your visit and are encouraged to read my note which details my assessment and plan for your condition.  To learn more about your condition and rheumatic diseases evaluated and treated by rheumatologists, as well as gain access to many helpful resources about these diseases, visit our website at [email or web address].

## 2024-04-10 ENCOUNTER — APPOINTMENT (OUTPATIENT)
Dept: MEDICAL GROUP | Facility: LAB | Age: 79
End: 2024-04-10
Payer: COMMERCIAL

## 2024-04-20 DIAGNOSIS — M54.31 SCIATICA OF RIGHT SIDE WITHOUT BACK PAIN: ICD-10-CM

## 2024-04-20 DIAGNOSIS — E11.42 DIABETIC POLYNEUROPATHY ASSOCIATED WITH TYPE 2 DIABETES MELLITUS (HCC): ICD-10-CM

## 2024-04-22 RX ORDER — TRAMADOL HYDROCHLORIDE 50 MG/1
50 TABLET ORAL EVERY 8 HOURS PRN
Qty: 90 TABLET | Refills: 1 | Status: SHIPPED | OUTPATIENT
Start: 2024-04-22 | End: 2024-06-21

## 2024-04-22 NOTE — TELEPHONE ENCOUNTER
Received request via: Pharmacy    Was the patient seen in the last year in this department? Yes  LOV : 12/202/2023   Does the patient have an active prescription (recently filled or refills available) for medication(s) requested? Yes.   [facility]     [facility] the [facility] and need 100 day supply (blood pressure, diabetes and cholesterol meds only)? Patient does not have SCP

## 2024-04-24 ENCOUNTER — OCCUPATIONAL THERAPY (OUTPATIENT)
Dept: OCCUPATIONAL THERAPY | Facility: MEDICAL CENTER | Age: 79
End: 2024-04-24
Attending: STUDENT IN AN ORGANIZED HEALTH CARE EDUCATION/TRAINING PROGRAM
Payer: MEDICARE

## 2024-04-24 DIAGNOSIS — M25.50 ARTHRALGIA OF MULTIPLE JOINTS: ICD-10-CM

## 2024-04-24 PROCEDURE — 97530 THERAPEUTIC ACTIVITIES: CPT

## 2024-04-24 PROCEDURE — 97165 OT EVAL LOW COMPLEX 30 MIN: CPT

## 2024-04-24 ASSESSMENT — ENCOUNTER SYMPTOMS
PAIN SCALE AT LOWEST: 5
PAIN SCALE AT HIGHEST: 10
PAIN SCALE: 5

## 2024-04-24 NOTE — OP THERAPY EVALUATION
"  Outpatient Occupational Therapy  HAND THERAPY INITIAL EVALUATION    [facility]  [address]  [place] [address]  Phone:  [phone]  Fax:  [phone]    Date of Evaluation: 2024    Patient: Lucero Palm  YOB: 1945  MRN: [id number]     Referring Provider: EDY Flower [facility], [address]  [place] [address]   Referring Diagnosis Pain in unspecified joint [M25.50]     Time Calculation    Start time: 1345  Stop time: 1430 Time Calculation (min): 45 minutes             Chief Complaint: Hand Problem    Visit Diagnoses     ICD-10-CM   1. Arthralgia of multiple joints  M25.50       Subjective:   History of Present Illness:     Mechanism of injury:  Patient referred by Dr. Ho of rheumatology for \"arthralgia of multiple joints\". Per Dr. YUEN note from 3/27: \"Presentation is compatible with biomechanical arthralgia secondary to osteoarthritis, but the possibility of concomitant evolving low-grade inflammatory arthritis cannot be entirely excluded. That said, the current clinical evidence does not support the presence of an underlying autoimmune inflammatory arthritis, such as rheumatoid arthritis, spondyloarthritis, or lupus-spectrum arthritis. In any case, reasonable to undertake further investigation with the workup noted below for confirmatory, exclusionary, and risk stratification purposes.\"    Per pt she had CTR in October, she did do OT after this for L hand, feels R hand is worse from an arthritis perspective. Primary complaint is pain in pips/dips bilaterally  Sleep disturbance: takes pain medication prior to bed.  Pain:     Current pain ratin    At best pain ratin    At worst pain rating:  10  Social Support:     Lives with:  Spouse  Hand dominance:  Right  Treatments:     Current treatment:  Heat and medication  Activities of Daily Living:     Patient reported ADL status: Patient " reports having trouble and pain with cooking, carrying things, getting dressed. Patient reports balance issues and fear of falling, she did have a fall 3 years ago when she tripped on uneven terrain.   Patient Goals:     Patient goals for therapy:  Decreased pain, increased strength and independence with ADLs/IADLs      Past Medical History:   Diagnosis Date    Arthritis     foot, left thumb    Cataract     kimberley IOL    Diabetes (HCC)     oral medication    GERD (gastroesophageal reflux disease)     High cholesterol     Pain     right foot     Past Surgical History:   Procedure Laterality Date    LITHOTRIPSY  2015    OTHER  2011    D&C    INGUINAL HERNIA REPAIR Left 2009    OTHER  1972    back surgery     Social History     Tobacco Use    Smoking status: Never    Smokeless tobacco: Never   Substance Use Topics    Alcohol use: Yes     Alcohol/week: 0.6 oz     Types: 1 Glasses of wine per week     Comment: 2-3 per month     Family and Occupational History     Socioeconomic History    Marital status:      Spouse name: Not on file    Number of children: Not on file    Years of education: Not on file    Highest education level: Not on file   Occupational History    Not on file       Objective     Active Range of Motion     Left Wrist   Wrist flexion: WFL  Wrist extension: WFL  Wrist pronation: WFL  Wrist supination: WFL  Radial deviation: WFL  Ulnar deviation: WFL      Right Wrist   Wrist flexion: WFL  Wrist extension: WFl  Wrist pronation: WFL  Wrist supination: WFL  Radial deviation: WFL  Ulnar deviation: WFL    Left Thumb   Opposition: Can complete opposition to all digits and pinky pad, limited retropulsion and abduction though no pain with either movement. CMC and MCP are both visibly migrated medially     Right Thumb   Opposition: Can complete opposition to all digits and pinky pad, limited retropulsion and abduction though no pain with either movement. CMC and MCP are both visibly migrated medially      Additional Active Range of Motion Details  No pain with movement, observable tendency toward ulnar deviation   Difficulty making a full fist, feels tightness/pulling in all joints with R worse than left         Therapeutic Exercises (CPT 44911):     1. tendon glides, x5 reps, BUE    2. grasp/release with blocks for decreased neural tension, x20 reps, BUE    Therapeutic Treatments and Modalities:    1. Therapeutic Activities (CPT 44160), see below    Therapeutic Treatments and Modalities Summary: Education on strategies for long term mitigation of symptoms;  Compression gloves for increased circulation, using gloves with  to assist with  integrity   Heat options; heating pad, paraffin setup. Recommend daily application  Avoid doing things are are causing pain; try alternative grasp patterns or using more proximal support     Time-based treatments/modalities:  Occupational Therapy Timed Treatment Charges  Therapeutic activity minutes (CPT 58975): 15 minutes      Assessment and Plan:   Problem list/assessment: abnormal or restricted ROM, decreased HEP knowledge, decreased strength and pain    Assessment details:  Patient presents to OT with primary complaint of bilateral hand pain, cites pips/dips as being primary source of pain with R hand more impacted than left. She has observable CMC/MCP migration in bilateral thumbs though these due not appear to be causing pain or impacting function at this time. Of note she also reports balance deficits, she is ambulatory with [facility] today, uses 4WW at home, reports high fear of falling, she is interested in working with PT on this if that is an option, will reach out to MD to follow up on this, will educate on home safety as appropriate.   Barriers to therapy:  None    Goals:   Short Term Goals: decrease pain, increase strength and independent with HEP performance  Short term goal timespan:  2-4 weeks  Patient progress towards short term goals:  HEP initiated     Long  Term Goals:   Patient will report no pain with getting dressed   Patient improve score on UEFI from 47/80 to 60/80  Patient will be consistent with HEP   Patient will be consistent with pain mitigation strategies for long term management of symptoms   Long term goal timespan:  4-6 weeks    Plan:   Occupational/Hand Therapy options:  Occupational therapy treatment to continue  Planned therapy interventions:  Adaptive training to increase functional performance, AROM, A/AROM, PROM, passive stretch, home exercise training, manual therapy (CPT 29197), patient/family/caregiver education and graded resistive tasks to increase strength  Planned modality interventions: moist hot pack (CPT 36039) and paraffin treatment (CPT 18412)  Prognosis: good    Frequency:  1x week  Duration in weeks:  8  Duration in visits:  8  Discussed with:  Patient  Patient/caregiver understanding of therapy treatment and goals: Good understanding of therapy treatment and goals  Plan details:  Progress pain free use of BUE; education on HEP for joint stability, pain management strategies, joint protection, adaptive strategies as needed for decreased pain and improved function with  hand based tasks       Functional Assessment Used    UEFI 47/80    Referring provider co-signature:  I have reviewed this plan of care and my co-signature certifies the need for services.    Certification Period: 04/24/2024 to  07/23/24    Physician Signature: ________________________________ Date: ______________

## 2024-04-29 ENCOUNTER — OCCUPATIONAL THERAPY (OUTPATIENT)
Dept: OCCUPATIONAL THERAPY | Facility: MEDICAL CENTER | Age: 79
End: 2024-04-29
Attending: STUDENT IN AN ORGANIZED HEALTH CARE EDUCATION/TRAINING PROGRAM
Payer: MEDICARE

## 2024-04-29 DIAGNOSIS — M25.50 ARTHRALGIA OF MULTIPLE JOINTS: ICD-10-CM

## 2024-04-29 PROCEDURE — 97530 THERAPEUTIC ACTIVITIES: CPT

## 2024-04-29 PROCEDURE — 97140 MANUAL THERAPY 1/> REGIONS: CPT

## 2024-04-29 PROCEDURE — 97110 THERAPEUTIC EXERCISES: CPT

## 2024-04-29 NOTE — OP THERAPY DAILY TREATMENT
Outpatient Occupational Therapy  DAILY TREATMENT     [facility] Therapy  [address]  [place] [address]  Phone:  [phone]  Fax:  [phone]    Date: 04/29/2024    Patient: Lucero Palm  YOB: 1945  MRN: [id number]     Time Calculation  Start time: 1015  Stop time: 1100 Time Calculation (min): 45 minutes         Chief Complaint: Hand Problem    Visit #: 2    SUBJECTIVE:  Patient reports continued stiffness in dips/pips and numbness in the tips of her fingers primarily digits 2-4 bilaterally, no increase in pain with HEP    OBJECTIVE:  Current objective measures:         Therapeutic Exercises (CPT 34076):     1. tendon glides, x5 reps, BUE    2. grasp/release with blocks for decreased neural tension, x20 reps, BUE    Therapeutic Treatments and Modalities:    1. Hot or Cold Pack Therapy (CPT 40435), BUE, hot back to BUE 10 min for exploration of pain management strategies    2. Manual Therapy (CPT 71306), BUE, dermal mobilization BUE digits 2-4, cupping gun vollarly    Therapeutic Treatments and Modalities Summary: Education on strategies for long term mitigation of symptoms;  Compression gloves for increased circulation, using gloves with  to assist with  integrity   Heat options; patient purchased paraffin setup, went over how to use it and recommendation for daily application bilaterally   Avoid doing things are are causing pain; try alternative grasp patterns or using more proximal support         Time-based treatments/modalities:  Therapeutic exercise minutes (CPT 54220): 15 minutes  Therapeutic activity minutes (CPT 86478): 15 minutes  Manual therapy joint mobilization minutes (CPT 57678): 15 minutes        ASSESSMENT:   Response to treatment: Patient doing well, no adverse affects from HEP but no improvements observed since initiating either. We were able to reduce pain/stiffness with digit flex/ext with heat and  soft tissue mobilization within session, advised to continue HEP and start daily paraffin, if she does not observe improvement with this by next appt in 2 weeks we will re-assess approach.     PLAN/RECOMMENDATIONS:   Plan for treatment: therapy treatment to continue next visit.  Planned interventions for next visit: continue with current treatment

## 2024-04-30 DIAGNOSIS — M54.31 SCIATICA OF RIGHT SIDE WITHOUT BACK PAIN: ICD-10-CM

## 2024-04-30 DIAGNOSIS — G56.02 CARPAL TUNNEL SYNDROME OF LEFT WRIST: ICD-10-CM

## 2024-04-30 DIAGNOSIS — E11.42 DIABETIC POLYNEUROPATHY ASSOCIATED WITH TYPE 2 DIABETES MELLITUS (HCC): ICD-10-CM

## 2024-05-15 ENCOUNTER — APPOINTMENT (OUTPATIENT)
Dept: OCCUPATIONAL THERAPY | Facility: MEDICAL CENTER | Age: 79
End: 2024-05-15
Attending: STUDENT IN AN ORGANIZED HEALTH CARE EDUCATION/TRAINING PROGRAM
Payer: MEDICARE

## 2024-05-22 ENCOUNTER — APPOINTMENT (OUTPATIENT)
Dept: OCCUPATIONAL THERAPY | Facility: MEDICAL CENTER | Age: 79
End: 2024-05-22
Payer: MEDICARE

## 2024-05-29 ENCOUNTER — OCCUPATIONAL THERAPY (OUTPATIENT)
Dept: OCCUPATIONAL THERAPY | Facility: MEDICAL CENTER | Age: 79
End: 2024-05-29
Attending: INTERNAL MEDICINE
Payer: MEDICARE

## 2024-05-29 DIAGNOSIS — M25.50 ARTHRALGIA OF MULTIPLE JOINTS: ICD-10-CM

## 2024-05-29 NOTE — OP THERAPY DAILY TREATMENT
Outpatient Occupational Therapy  DAILY TREATMENT     [facility]  [address]  [place] [address]  Phone:  [phone]  Fax:  [phone]    Date: 05/29/2024    Patient: Lucero Palm  YOB: 1945  MRN: [id number]     Time Calculation  Start time: 1430  Stop time: 1515 Time Calculation (min): 45 minutes         Chief Complaint: Hand Problem    Visit #: 3    SUBJECTIVE:  Pt reports continued stiffness in bilateral hands primarily pips/dips, continued numbness in fingertips, limited compliance/follow through with previously recommended pain management strategies     OBJECTIVE:  Current objective measures:         Therapeutic Exercises (CPT 55677):     1. tendon glides, x5 reps, BUE    2. grasp/release with blocks for decreased neural tension, x20 reps, BUE    Therapeutic Exercise Summary:  Revisited HEP above, re-printed information for carryover    Therapeutic Treatments and Modalities:    1. Paraffin Treatment (CPT 05706), BUE, 15 min BUE as exploration of heat for pain management and education on use of home setup    Therapeutic Treatments and Modalities Summary: Education on strategies for long term mitigation of symptoms;  Compression gloves for increased circulation, using at night to decrease morning stiffness  Heat options; patient purchased paraffin setup a few weeks ago but still has not used it, we went over how to use it and recommendation for daily application bilaterally   Avoid doing things are are causing pain; try alternative grasp patterns or using more proximal support         Time-based treatments/modalities:  Therapeutic exercise minutes (CPT 99981): 15 minutes  Therapeutic activity minutes (CPT 42509): 30 minutes          ASSESSMENT:   Response to treatment: Patient with limited progress at this point, she has purchased paraffin and compression gloves but has not yet initiated using, reports limited  compliance with HEP recommendations. She faith benefit from repetition of information for carryover due to age related memory deficits, we will plan to continue therapy with focus on education on pain mitigation symptoms and application at home.     PLAN/RECOMMENDATIONS:   Plan for treatment: therapy treatment to continue next visit.  Planned interventions for next visit: continue with current treatment

## 2024-05-29 NOTE — OP THERAPY PROGRESS SUMMARY
Outpatient Occupational Therapy  PROGRESS SUMMARY NOTE    [facility]  [address]  [place] [address]  Phone:  [phone]  Fax:  [phone]    Date of Visit: 05/29/2024    Patient: Lucero Palm  YOB: 1945  MRN: [id number]     Referring Provider: EDY Flower, [address]  [place] [address]   Referring Diagnosis Pain in unspecified joint [M25.50]     Visit Diagnoses     ICD-10-CM   1. Arthralgia of multiple joints  M25.50       Rehab Potential: fair    Progress Report Period: 4/24/24-5/29/24 pt seen for follow ups x2 since eval including today             Objective Findings and Assessment:   Patient progression towards goals: Short Term Goals:   decrease pain NOT MET  increase strength NOT MET  independent with HEP performance NOT MET  Short term goal timespan:  2-4 weeks     Long Term Goals:   Patient will report no pain with getting dressed   Patient improve score on UEFI from 47/80 to 60/80  Patient will be consistent with HEP   Patient will be consistent with pain mitigation strategies for long term management of symptoms   Long term goal timespan:  4-6 weeks      Objective findings and assessment details: Patient with limited progress at this point, she has purchased paraffin and compression gloves but has not yet initiated using, reports limited compliance with HEP recommendations. She faith benefit from repetition of information for carryover due to age related memory deficits, we will plan to continue therapy with focus on education on pain mitigation symptoms and application at home.     Goals:   Short Term Goals:   Patient will be consistent with HEP   Patient will be consistent with pain mitigation strategies for long term management of symptoms     Short term goal timespan:  2-4 weeks    Long Term Goals:   Patient improve score on UEFI from 47/80 to 60/80  Patient will report no pain  with getting dressed     Long term goal timespan:  6-8 weeks    Plan:   Planned therapy interventions:  Manual Therapy (CPT 64078), Hot or Cold Pack Therapy (CPT 78953), Paraffin Treatment (CPT 83314), Therapeutic Activities (CPT 96467) and Therapeutic Exercise (CPT 44971)  Frequency:  1x week  Duration in weeks:  6  Duration in visits:  6  Plan details:  Progress pain free use of BUE; education on HEP for joint stability, pain management strategies, joint protection, adaptive strategies as needed for decreased pain and improved function with  hand based tasks       Referring provider co-signature:  I have reviewed this plan of care and my co-signature certifies the need for services.    Certification Period: 05/29/2024 to 08/27/24    Physician Signature: ________________________________ Date: ______________

## 2024-06-04 ENCOUNTER — TELEPHONE (OUTPATIENT)
Dept: OCCUPATIONAL THERAPY | Facility: REHABILITATION | Age: 79
End: 2024-06-04
Payer: MEDICARE

## 2024-06-04 NOTE — OP THERAPY DISCHARGE SUMMARY
Outpatient Occupational Therapy  DISCHARGE SUMMARY NOTE    [facility]  [address]  [address]  [place] [address]  Phone:  [phone]  Fax:  [phone]    Date of Visit: 06/04/2024    Patient: Lucero Palm  YOB: 1945  MRN: [id number]     Referring Provider: EDY Flower [facility], [address]  [place] [address]   Referring Diagnosis Pain in unspecified joint [M25.50]       Your patient is being discharged from Occupational Therapy with the following comments:   Goals partially met    Comments:  Pt requested discharge via phone, as discussed in prior sessions we have gone over relevant information and HEP, follow ups were to ensure carryover and to evaluate impact. Pt feels she has gotten what she needs out of therapy and can apply at home for self management. We will d/c from OT services.      Shasha Collier MS,OTR/L    Date: 6/4/2024

## 2024-06-05 ENCOUNTER — APPOINTMENT (OUTPATIENT)
Dept: OCCUPATIONAL THERAPY | Facility: MEDICAL CENTER | Age: 79
End: 2024-06-05
Attending: INTERNAL MEDICINE
Payer: MEDICARE

## 2024-06-06 ENCOUNTER — TELEPHONE (OUTPATIENT)
Dept: MEDICAL GROUP | Facility: LAB | Age: 79
End: 2024-06-06
Payer: MEDICARE

## 2024-06-06 NOTE — TELEPHONE ENCOUNTER
Caller Name: Lucero  Call Back Number: [phone]    How would the patient prefer to be contacted with a response: Phone call OK to leave a detailed message    Pt called stating she has an issue and needs to speak with Dr Crocker. Let her know it could be 48-72 hours.

## 2024-06-08 NOTE — TELEPHONE ENCOUNTER
Telephone call to the patient, and is concerned about a sick feeling that she is experiencing with possibly flank pain, I have recommended urgent care over the weekend.  Patient and I have discussed that she is having problems obtaining Trulicity.  I have asked her to make a follow-up appointment

## 2024-06-10 ENCOUNTER — APPOINTMENT (OUTPATIENT)
Dept: OCCUPATIONAL THERAPY | Facility: MEDICAL CENTER | Age: 79
End: 2024-06-10
Attending: INTERNAL MEDICINE
Payer: MEDICARE

## 2024-06-12 ENCOUNTER — APPOINTMENT (OUTPATIENT)
Dept: OCCUPATIONAL THERAPY | Facility: MEDICAL CENTER | Age: 79
End: 2024-06-12
Attending: INTERNAL MEDICINE
Payer: MEDICARE

## 2024-06-13 ENCOUNTER — PHYSICAL THERAPY (OUTPATIENT)
Dept: PHYSICAL THERAPY | Facility: MEDICAL CENTER | Age: 79
End: 2024-06-13
Attending: INTERNAL MEDICINE
Payer: MEDICARE

## 2024-06-13 DIAGNOSIS — G56.02 CARPAL TUNNEL SYNDROME OF LEFT WRIST: ICD-10-CM

## 2024-06-13 DIAGNOSIS — M54.31 SCIATICA OF RIGHT SIDE WITHOUT BACK PAIN: ICD-10-CM

## 2024-06-13 DIAGNOSIS — E11.42 DIABETIC POLYNEUROPATHY ASSOCIATED WITH TYPE 2 DIABETES MELLITUS (HCC): ICD-10-CM

## 2024-06-13 PROCEDURE — 97162 PT EVAL MOD COMPLEX 30 MIN: CPT

## 2024-06-13 PROCEDURE — 97110 THERAPEUTIC EXERCISES: CPT

## 2024-06-13 ASSESSMENT — ENCOUNTER SYMPTOMS
PAIN TIMING: INTERMITTENT
PAIN LOCATION: LEFT SIDED BACK PAIN

## 2024-06-13 NOTE — OP THERAPY EVALUATION
Outpatient Physical Therapy  INITIAL EVALUATION    [facility] Outpatient Physical Therapy  [address]  [place] [address]  Phone:  [phone]  Fax:  [phone]    Date of Evaluation: 06/13/2024    Patient: Lucero Palm  YOB: 1945  MRN: [id number]     Referring Provider: Jp Crocker D.O.  [address] [facility]  [address]  [place] [address]   Referring Diagnosis No admission diagnoses are documented for this encounter.     Time Calculation  Start time: 1331  Stop time: 1409 Time Calculation (min): 38 minutes         Chief Complaint: Back Problem    Visit Diagnoses     ICD-10-CM   1. Sciatica of right side without back pain  M54.31   2. Diabetic polyneuropathy associated with type 2 diabetes mellitus (HCC)  E11.42   3. Carpal tunnel syndrome of left wrist  G56.02       Date of onset of impairment: No data found    Subjective:   History of Present Illness:     Mechanism of injury:  Patient is an 78 year old female with a PMH including: Type II DM, GERD, obesity, DM type II, CTS L wrist, high cholesterol.    Pt presents to therapy with complaints of chronic intermittent LBP and difficulties with walking. Pt reporting her priority is improving her walking. Stating she had one fall but has a mental fear of falling. Denies dizziness or fainting spells with falls. Utilizes walker in the home and in the community. Has three walkers.     Back pain is inconsistent but she becomes fatigued. Needs to rest after a block with the walker on level terrain. Believes pregabalin and tramadol medication might be increasing fatigue but has n/t and pain in limbs when she forgoes these pills. Pt reporting she has been dx-ed with neuropathy in the feet.     Currently denies changes in bowel and bladder, saddle anesthesia, significant weight changes, chills/night sweats, nausea and vomiting, and unexplained fatigue. Denies history of cancer. Pt consents to evaluation  and treatment today.           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 Spending time with friends/family    Patient Goals:     Patient goals for therapy:  Improved balance    Other patient goals:  Improve walking; improve balance      Past Medical History:   Diagnosis Date   • Arthritis     foot, left thumb   • Cataract     kimberley IOL   • Diabetes (HCC)     oral medication   • GERD (gastroesophageal reflux disease)    • High cholesterol    • Pain     right foot     Past Surgical History:   Procedure Laterality Date   • LITHOTRIPSY  2015   • OTHER  2011    D&C   • INGUINAL HERNIA REPAIR Left 2009   • OTHER  1972    back surgery     Social History     Tobacco Use   • Smoking status: Never   • Smokeless tobacco: Never   Substance Use Topics   • Alcohol use: Yes     Alcohol/week: 0.6 oz     Types: 1 Glasses of wine per week     Comment: 2-3 per month     Family and Occupational History     Socioeconomic History   • Marital status:      Spouse name: Not on file   • Number of children: Not on file   • Years of education: Not on file   • Highest education level: Not on file   Occupational History   • Not on file       Objective     Postural Observations  Seated posture: poor    Additional Postural Observation Details  Forward head and rounded shoulders     Neurological Testing     Additional Neurological Details  Decreased sensation (tingling) at feet  -pt reporting worse at night    Active Range of Motion     Lumbar   Flexion: within functional limits (FT to mid shins)  Extension: decreased (max restricted)  Left lateral flexion: within functional limits  Right lateral flexion: within functional limits  Left rotation: within functional limits  Right rotation: within functional limits    Strength:      Additional Strength Details  Bridge: 3/4 AROM; decreased length at hip flexors  Sit to stand: requires UE strength and repeated attempts    Tests     Additional Tests Details  5xSTS: 24 sec (requires UE use)    General Comments     Spine Comments   Balance:  SLS: unable  Tandem stance: able to take a small step; requires SBA; pt observed seeking furniture for UE hold  Standing trunk turns: decreased R>L; SBA for safety; visible muscle juddering    Gait: forward trunk lean and bradykinetic gait with rollator walker  (Walker observed to be too low for this patient)        Therapeutic Exercises (CPT 34928):     1. pt education, re: PT exam findings and POC    2. new hep as below      Therapeutic Exercise Summary: Access Code: [email or web address]  URL: [email or web address]  Date: 06/13/2024  Prepared by: Taqueria Kimble    Exercises  - Supine Bridge  - 2 x daily - 7 x weekly - 1 sets - 10 reps  - Sit to Stand  - 2 x daily - 7 x weekly - 1 sets - 10 reps    Pt performed these exercises with instruction and SPV.  Provided handout with these exercises for daily HEP.        Time-based treatments/modalities:    Physical Therapy Timed Treatment Charges  Therapeutic exercise minutes (CPT 93380): 8 minutes      Assessment, Response and Plan:   Impairments: abnormal gait, abnormal or restricted ROM, activity intolerance, impaired physical strength and lacks appropriate home exercise program    Assessment details:  Patient is a pleasant and cooperative 78 year old female who presents to therapy with c/o imbalance and difficulties with walking. She has intermittent LBP as well but  this is well managed and not her primary concern, per pt's report. No imaging of l/s on file; no red flags. Has hx of one fall but is slow and cautious to avoid additional falls. Per pt, has mental fear of falls. She is currently utilizing a walker in the home and within the community for mobility.  Exam findings suggestive of generalized deconditioning, poor transfers-requires UE assist and multiple attempts, posterior chain and core weakness, imbalance, and poor gait with forward flexed posture and bradykinetic pattern with walker. Pt is considered a fall risk and will participate in formal balance testing in next session to determine additional deficits. Pt may benefit from skilled physical therapy in order to address above impairments in order to improve QOL and return to reported ADL's.     Barriers to therapy:  Comorbidities  Prognosis: fair    Goals:   Short Term Goals:   1. Pt will be independent with written HEP.      Short term goal time span:  2-4 weeks      Long Term Goals:    1. Pt will be independent with written HEP.  2. Pt will have a sig improvement in balance testing.  3. Pt will have no falls.  4. Pt will be able to walk 4 blocks or greater on level terrain with LRAD before fatigue to complete her daily life, self-care, and recreational activities.      Long term goal time span:  6-8 weeks    Plan:   Therapy options:  Physical therapy treatment to continue  Planned therapy interventions:  Neuromuscular Re-education (CPT 67172), Gait Training (CPT 92278), Manual Therapy (CPT 93188), Therapeutic Exercise (CPT 88416) and E Stim Unattended (CPT 47923)  Frequency: 1-2x/wk.  Duration in weeks:  8  Discussed with:  Patient  Plan details:  UPOC: 8/9/24        Functional Assessment Used    RMQ: 0     Referring provider co-signature:  I have reviewed this plan of care and my co-signature certifies the need for services.    Certification Period: 06/13/2024 to  8/9/24    Physician Signature:  ________________________________ Date: ______________

## 2024-06-17 ENCOUNTER — APPOINTMENT (OUTPATIENT)
Dept: OCCUPATIONAL THERAPY | Facility: MEDICAL CENTER | Age: 79
End: 2024-06-17
Attending: INTERNAL MEDICINE
Payer: MEDICARE

## 2024-06-19 ENCOUNTER — APPOINTMENT (OUTPATIENT)
Dept: OCCUPATIONAL THERAPY | Facility: MEDICAL CENTER | Age: 79
End: 2024-06-19
Attending: INTERNAL MEDICINE
Payer: MEDICARE

## 2024-06-19 ENCOUNTER — APPOINTMENT (OUTPATIENT)
Dept: PHYSICAL THERAPY | Facility: MEDICAL CENTER | Age: 79
End: 2024-06-19
Payer: MEDICARE

## 2024-06-24 ENCOUNTER — APPOINTMENT (OUTPATIENT)
Dept: OCCUPATIONAL THERAPY | Facility: MEDICAL CENTER | Age: 79
End: 2024-06-24
Attending: INTERNAL MEDICINE
Payer: MEDICARE

## 2024-07-03 ENCOUNTER — APPOINTMENT (OUTPATIENT)
Dept: PHYSICAL THERAPY | Facility: MEDICAL CENTER | Age: 79
End: 2024-07-03
Attending: INTERNAL MEDICINE
Payer: MEDICARE

## 2024-07-10 ENCOUNTER — APPOINTMENT (OUTPATIENT)
Dept: PHYSICAL THERAPY | Facility: MEDICAL CENTER | Age: 79
End: 2024-07-10
Attending: INTERNAL MEDICINE
Payer: MEDICARE

## 2024-07-15 ENCOUNTER — APPOINTMENT (OUTPATIENT)
Dept: PHYSICAL THERAPY | Facility: MEDICAL CENTER | Age: 79
End: 2024-07-15
Attending: INTERNAL MEDICINE
Payer: MEDICARE

## 2024-07-16 ENCOUNTER — APPOINTMENT (OUTPATIENT)
Dept: PHYSICAL THERAPY | Facility: MEDICAL CENTER | Age: 79
End: 2024-07-16
Attending: INTERNAL MEDICINE
Payer: MEDICARE

## 2024-07-22 ENCOUNTER — TELEMEDICINE ORIGINATING SITE VISIT (OUTPATIENT)
Dept: MEDICAL GROUP | Facility: LAB | Age: 79
End: 2024-07-22
Payer: MEDICARE

## 2024-07-22 DIAGNOSIS — E11.42 DIABETIC POLYNEUROPATHY ASSOCIATED WITH TYPE 2 DIABETES MELLITUS (HCC): ICD-10-CM

## 2024-07-22 DIAGNOSIS — M54.31 SCIATICA OF RIGHT SIDE WITHOUT BACK PAIN: ICD-10-CM

## 2024-07-22 RX ORDER — TRAMADOL HYDROCHLORIDE 50 MG/1
50 TABLET ORAL EVERY 8 HOURS PRN
Qty: 90 TABLET | Refills: 1 | Status: SHIPPED | OUTPATIENT
Start: 2024-07-22 | End: 2024-09-20

## 2024-08-12 DIAGNOSIS — E11.42 DIABETIC POLYNEUROPATHY ASSOCIATED WITH TYPE 2 DIABETES MELLITUS (HCC): ICD-10-CM

## 2024-08-12 DIAGNOSIS — M54.31 SCIATICA OF RIGHT SIDE WITHOUT BACK PAIN: ICD-10-CM

## 2024-08-12 RX ORDER — PREGABALIN 50 MG/1
50 CAPSULE ORAL 3 TIMES DAILY
Qty: 90 CAPSULE | Refills: 5 | Status: SHIPPED | OUTPATIENT
Start: 2024-08-12 | End: 2025-02-08

## 2024-08-12 NOTE — TELEPHONE ENCOUNTER
Received request via: Patient    Was the patient seen in the last year in this department? Yes    Does the patient have an active prescription (recently filled or refills available) for medication(s) requested? No    [facility]    Does the patient have shelter Plus and need 100-day supply? (This applies to ALL medications) Patient does not have SCP

## 2024-08-13 DIAGNOSIS — E11.69 TYPE 2 DIABETES MELLITUS WITH OBESITY (HCC): ICD-10-CM

## 2024-08-13 DIAGNOSIS — R20.0 NUMBNESS AND TINGLING IN LEFT HAND: ICD-10-CM

## 2024-08-13 DIAGNOSIS — E66.9 TYPE 2 DIABETES MELLITUS WITH OBESITY (HCC): ICD-10-CM

## 2024-08-13 DIAGNOSIS — R20.2 NUMBNESS AND TINGLING IN LEFT HAND: ICD-10-CM

## 2024-08-13 RX ORDER — PREGABALIN 25 MG/1
25 CAPSULE ORAL 3 TIMES DAILY
Qty: 90 CAPSULE | Refills: 2 | Status: SHIPPED | OUTPATIENT
Start: 2024-08-13 | End: 2024-10-12

## 2024-08-13 NOTE — TELEPHONE ENCOUNTER
Lucero called and said Lyrica went to [facility] and it needs to go [facility].

## 2024-08-13 NOTE — TELEPHONE ENCOUNTER
Received request via: Patient    Was the patient seen in the last year in this department? Yes    Does the patient have an active prescription (recently filled or refills available) for medication(s) requested? No    Ph[facility]    Does the patient have assisted Plus and need 100-day supply? (This applies to ALL medications) Patient does not have SCP

## 2024-08-14 NOTE — TELEPHONE ENCOUNTER
Jeny:  Pregabalin (Lyrica) has been sent to [facility] pharmacy in [place]!  Regards, Jp Crocker, DO

## 2024-10-03 ENCOUNTER — OFFICE VISIT (OUTPATIENT)
Dept: MEDICAL GROUP | Facility: LAB | Age: 79
End: 2024-10-03
Payer: MEDICARE

## 2024-10-03 ENCOUNTER — HOSPITAL ENCOUNTER (OUTPATIENT)
Dept: LAB | Facility: MEDICAL CENTER | Age: 79
End: 2024-10-03
Attending: INTERNAL MEDICINE
Payer: MEDICARE

## 2024-10-03 ENCOUNTER — HOSPITAL ENCOUNTER (OUTPATIENT)
Dept: RADIOLOGY | Facility: MEDICAL CENTER | Age: 79
End: 2024-10-03
Attending: INTERNAL MEDICINE
Payer: MEDICARE

## 2024-10-03 ENCOUNTER — HOSPITAL ENCOUNTER (OUTPATIENT)
Dept: RADIOLOGY | Facility: MEDICAL CENTER | Age: 79
End: 2024-10-03
Attending: STUDENT IN AN ORGANIZED HEALTH CARE EDUCATION/TRAINING PROGRAM
Payer: MEDICARE

## 2024-10-03 VITALS
WEIGHT: 226.85 LBS | OXYGEN SATURATION: 94 % | HEART RATE: 84 BPM | HEIGHT: 66 IN | RESPIRATION RATE: 18 BRPM | TEMPERATURE: 97.2 F | BODY MASS INDEX: 36.46 KG/M2

## 2024-10-03 DIAGNOSIS — M54.2 CERVICAL SPINE PAIN: ICD-10-CM

## 2024-10-03 DIAGNOSIS — E66.9 TYPE 2 DIABETES MELLITUS WITH OBESITY (HCC): ICD-10-CM

## 2024-10-03 DIAGNOSIS — E11.69 TYPE 2 DIABETES MELLITUS WITH OBESITY (HCC): ICD-10-CM

## 2024-10-03 DIAGNOSIS — Z78.0 MENOPAUSE: ICD-10-CM

## 2024-10-03 DIAGNOSIS — M25.50 ARTHRALGIA OF MULTIPLE JOINTS: ICD-10-CM

## 2024-10-03 LAB
EST. AVERAGE GLUCOSE BLD GHB EST-MCNC: 166 MG/DL
HBA1C MFR BLD: 7.4 % (ref 4–5.6)

## 2024-10-03 PROCEDURE — 82043 UR ALBUMIN QUANTITATIVE: CPT

## 2024-10-03 PROCEDURE — 82570 ASSAY OF URINE CREATININE: CPT

## 2024-10-03 PROCEDURE — 83036 HEMOGLOBIN GLYCOSYLATED A1C: CPT

## 2024-10-03 PROCEDURE — 77077 JOINT SURVEY SINGLE VIEW: CPT

## 2024-10-03 PROCEDURE — 99214 OFFICE O/P EST MOD 30 MIN: CPT | Performed by: INTERNAL MEDICINE

## 2024-10-03 PROCEDURE — 80061 LIPID PANEL: CPT

## 2024-10-03 PROCEDURE — 72040 X-RAY EXAM NECK SPINE 2-3 VW: CPT

## 2024-10-03 PROCEDURE — 36415 COLL VENOUS BLD VENIPUNCTURE: CPT

## 2024-10-03 ASSESSMENT — FIBROSIS 4 INDEX: FIB4 SCORE: 1.89

## 2024-10-04 LAB
CHOLEST SERPL-MCNC: 236 MG/DL (ref 100–199)
CREAT UR-MCNC: 95.4 MG/DL
HDLC SERPL-MCNC: 48 MG/DL
LDLC SERPL CALC-MCNC: 157 MG/DL
MICROALBUMIN UR-MCNC: 1.4 MG/DL
MICROALBUMIN/CREAT UR: 15 MG/G (ref 0–30)
TRIGL SERPL-MCNC: 153 MG/DL (ref 0–149)

## 2024-10-07 ENCOUNTER — TELEPHONE (OUTPATIENT)
Dept: MEDICAL GROUP | Facility: LAB | Age: 79
End: 2024-10-07
Payer: MEDICARE

## 2024-10-07 DIAGNOSIS — M54.50 CHRONIC LOW BACK PAIN, UNSPECIFIED BACK PAIN LATERALITY, UNSPECIFIED WHETHER SCIATICA PRESENT: ICD-10-CM

## 2024-10-07 DIAGNOSIS — G89.29 CHRONIC LOW BACK PAIN, UNSPECIFIED BACK PAIN LATERALITY, UNSPECIFIED WHETHER SCIATICA PRESENT: ICD-10-CM

## 2024-10-08 ENCOUNTER — APPOINTMENT (OUTPATIENT)
Dept: RADIOLOGY | Facility: IMAGING CENTER | Age: 79
End: 2024-10-08
Attending: INTERNAL MEDICINE
Payer: MEDICARE

## 2024-10-08 DIAGNOSIS — M54.50 CHRONIC LOW BACK PAIN, UNSPECIFIED BACK PAIN LATERALITY, UNSPECIFIED WHETHER SCIATICA PRESENT: ICD-10-CM

## 2024-10-08 DIAGNOSIS — G89.29 CHRONIC LOW BACK PAIN, UNSPECIFIED BACK PAIN LATERALITY, UNSPECIFIED WHETHER SCIATICA PRESENT: ICD-10-CM

## 2024-10-08 PROCEDURE — 72100 X-RAY EXAM L-S SPINE 2/3 VWS: CPT | Mod: TC | Performed by: PHYSICIAN ASSISTANT

## 2024-10-17 DIAGNOSIS — R20.0 NUMBNESS AND TINGLING IN LEFT HAND: ICD-10-CM

## 2024-10-17 DIAGNOSIS — E11.69 TYPE 2 DIABETES MELLITUS WITH OBESITY (HCC): ICD-10-CM

## 2024-10-17 DIAGNOSIS — R20.2 NUMBNESS AND TINGLING IN LEFT HAND: ICD-10-CM

## 2024-10-17 DIAGNOSIS — E66.9 TYPE 2 DIABETES MELLITUS WITH OBESITY (HCC): ICD-10-CM

## 2024-10-22 ENCOUNTER — TELEPHONE (OUTPATIENT)
Dept: MEDICAL GROUP | Facility: LAB | Age: 79
End: 2024-10-22
Payer: MEDICARE

## 2024-11-19 ENCOUNTER — PATIENT MESSAGE (OUTPATIENT)
Dept: MEDICAL GROUP | Facility: LAB | Age: 79
End: 2024-11-19

## 2024-11-22 ENCOUNTER — TELEPHONE (OUTPATIENT)
Dept: MEDICAL GROUP | Facility: LAB | Age: 79
End: 2024-11-22
Payer: MEDICARE

## 2024-11-22 NOTE — TELEPHONE ENCOUNTER
Caller Name: Lucero Palm   Call Back Number: [phone] (home)     How would the patient prefer to be contacted with a response: Phone call OK to leave a detailed message    Patient called regarding a referral and scheduling an appointment with Dr. Crocker. I called her back to ask clarifying questions and schedule her appointment but she did not answer. Left voicemail and will call again at a later time.

## 2024-11-26 ENCOUNTER — TELEPHONE (OUTPATIENT)
Dept: MEDICAL GROUP | Facility: LAB | Age: 79
End: 2024-11-26
Payer: MEDICARE

## 2024-11-26 DIAGNOSIS — J02.9 SORE THROAT: ICD-10-CM

## 2024-11-27 NOTE — TELEPHONE ENCOUNTER
VOICEMAIL  1. Caller Name: Lucero                      Call Back Number: [phone] (home)      2. Message: I need some cough medicine and something for my sore throat. Please send to [facility] in [place]  Attempted to return call for more information.   LVM    3. Patient approves office to leave a detailed voicemail/MyChart message: N\A

## 2024-11-27 NOTE — TELEPHONE ENCOUNTER
Elsa:  Cepacol throat lozenges have been sent to the patient's pharmacy!  Regards, Jp Crocker, DO

## 2024-12-05 ENCOUNTER — TELEPHONE (OUTPATIENT)
Dept: MEDICAL GROUP | Facility: LAB | Age: 79
End: 2024-12-05
Payer: MEDICARE

## 2024-12-05 DIAGNOSIS — J06.9 UPPER RESPIRATORY TRACT INFECTION, UNSPECIFIED TYPE: ICD-10-CM

## 2024-12-05 NOTE — TELEPHONE ENCOUNTER
Lucero called and requested a refill of Cough medicine w/ Codeine, she was seen 11/27/24 at [facility] care and was given: Guaifenesin-codeine syrup.  Medication Sig Dispensed Refills Start Date End Date   albuterol (PROVENTIL HFA;VENTOLIN HFA) 108 (90 Base) MCG/ACT inhaler  Inhale 2 puffs into the lungs every 4 hours as needed for wheezing. 18 gram  1 11/27/2024     cefdinir (OMNICEF) 300 mg capsule  Take 1 capsule (300 mg) by mouth 2 times a day for 7 days. 14 capsule    11/27/2024 12/04/2024   doxycycline hyclate (VIBRA-TABS) 100 mg tablet  Take 1 tablet (100 mg) by mouth 2 times a day for 7 days. 14 tablet    11/27/2024 12/04/2024   guaiFENesin-codeine (ROBITUSSIN AC) 100-10 MG/5ML syrup   Indications: Community acquired pneumonia of right lower lobe of lung Take 10 mL by mouth 3 times daily as needed for cough for up to 8 days. 240 mL    11/27/2024      Will you approve refill or will she need an ofc visit to follow-up, please advise.

## 2024-12-06 DIAGNOSIS — E78.5 DYSLIPIDEMIA: ICD-10-CM

## 2024-12-06 DIAGNOSIS — E11.69 TYPE 2 DIABETES MELLITUS WITH OBESITY (HCC): ICD-10-CM

## 2024-12-06 DIAGNOSIS — J06.9 UPPER RESPIRATORY TRACT INFECTION, UNSPECIFIED TYPE: ICD-10-CM

## 2024-12-06 DIAGNOSIS — E66.9 TYPE 2 DIABETES MELLITUS WITH OBESITY (HCC): ICD-10-CM

## 2024-12-06 NOTE — TELEPHONE ENCOUNTER
Kasia:  A prescription has been written for Robitussin plus codeine and sent to patient's [facility] pharmacy in [place].   Regards, Jp Crocker, DO

## 2024-12-06 NOTE — TELEPHONE ENCOUNTER
I left a voicemail on machine to inform patient of Approval for refill of cough syrup, Rx was sent to her pharmacy.

## 2024-12-07 NOTE — TELEPHONE ENCOUNTER
Received request via: Patient    Was the patient seen in the last year in this department? Yes    Does the patient have an active prescription (recently filled or refills available) for medication(s) requested? YES    Pharmacy Name: [id number] - [address] [[id number]]     Does the patient have [facility]P Plus and need 100-day supply? (This applies to ALL medications) Patient does not have SCP     Requested Prescriptions     Pending Prescriptions Disp Refills    pseudoephedrine-codeine-guaifenesin (MYTUSSIN DAC) 30- MG/5ML solution 120 mL 0     Sig: Take 10 mL by mouth every four hours as needed for Cough for up to 5 days.

## 2024-12-09 ENCOUNTER — OFFICE VISIT (OUTPATIENT)
Dept: MEDICAL GROUP | Facility: LAB | Age: 79
End: 2024-12-09
Payer: MEDICARE

## 2024-12-09 ENCOUNTER — HOSPITAL ENCOUNTER (OUTPATIENT)
Dept: RADIOLOGY | Facility: MEDICAL CENTER | Age: 79
End: 2024-12-09
Attending: INTERNAL MEDICINE
Payer: MEDICARE

## 2024-12-09 VITALS
TEMPERATURE: 97.5 F | WEIGHT: 222.88 LBS | HEART RATE: 76 BPM | HEIGHT: 67 IN | SYSTOLIC BLOOD PRESSURE: 124 MMHG | RESPIRATION RATE: 16 BRPM | BODY MASS INDEX: 34.98 KG/M2 | DIASTOLIC BLOOD PRESSURE: 70 MMHG | OXYGEN SATURATION: 94 %

## 2024-12-09 DIAGNOSIS — J18.9 PNEUMONIA DUE TO INFECTIOUS ORGANISM, UNSPECIFIED LATERALITY, UNSPECIFIED PART OF LUNG: ICD-10-CM

## 2024-12-09 DIAGNOSIS — E11.69 TYPE 2 DIABETES MELLITUS WITH OBESITY (HCC): ICD-10-CM

## 2024-12-09 DIAGNOSIS — E66.811 OBESITY (BMI 30.0-34.9): ICD-10-CM

## 2024-12-09 DIAGNOSIS — N63.12 MASS OF UPPER INNER QUADRANT OF RIGHT BREAST: ICD-10-CM

## 2024-12-09 DIAGNOSIS — E66.9 TYPE 2 DIABETES MELLITUS WITH OBESITY (HCC): ICD-10-CM

## 2024-12-09 PROCEDURE — 3074F SYST BP LT 130 MM HG: CPT | Performed by: INTERNAL MEDICINE

## 2024-12-09 PROCEDURE — 1125F AMNT PAIN NOTED PAIN PRSNT: CPT | Performed by: INTERNAL MEDICINE

## 2024-12-09 PROCEDURE — 3078F DIAST BP <80 MM HG: CPT | Performed by: INTERNAL MEDICINE

## 2024-12-09 PROCEDURE — 99214 OFFICE O/P EST MOD 30 MIN: CPT | Performed by: INTERNAL MEDICINE

## 2024-12-09 PROCEDURE — 71046 X-RAY EXAM CHEST 2 VIEWS: CPT

## 2024-12-09 RX ORDER — TRAMADOL HYDROCHLORIDE 50 MG/1
TABLET ORAL
COMMUNITY
Start: 2024-07-22

## 2024-12-09 RX ORDER — CELECOXIB 200 MG/1
CAPSULE ORAL
COMMUNITY
Start: 2024-11-06

## 2024-12-09 ASSESSMENT — PAIN SCALES - GENERAL: PAINLEVEL_OUTOF10: 6=MODERATE PAIN

## 2024-12-09 ASSESSMENT — FIBROSIS 4 INDEX: FIB4 SCORE: 1.89

## 2024-12-09 NOTE — PROGRESS NOTES
CC: Lucero Palm is a 79 y.o. female is suffering from   Chief Complaint   Patient presents with    Diabetes Follow-up     Labs done in October    Other     Pneumonia two weeks ago, completed antibiotics   Still having wet cough and left side of chest          SUBJECTIVE:  1. Type 2 diabetes mellitus with obesity (HCC)  Lucero is here for follow-up has a history of type 2 diabetes, we have discussed that this is reasonably controlled    2. Obesity (BMI 30.0-34.9)  Patient with a history of obesity, no change in medical therapy, referral written to endocrinology    3. Mass of upper inner quadrant of right breast  On physical examination today patient was noted to have questionable mass at the 11 o'clock position of the right breast also at the 2 o'clock position.  Diagnostic mammogram ultrasound ordered, orders have been stamped and signed, discussed with the patient possibly having this done through [facility] as a will travel back to [place]    4. Pneumonia due to infectious organism, unspecified laterality, unspecified part of lung  History of pneumonia, chest x-ray ordered        Past social, family, history:   Social History     Tobacco Use    Smoking status: Never    Smokeless tobacco: Never   Vaping Use    Vaping status: Never Used   Substance Use Topics    Alcohol use: Yes     Alcohol/week: 0.6 oz     Types: 1 Glasses of wine per week     Comment: 2-3 per month    Drug use: No         MEDICATIONS:    Current Outpatient Medications:     traMADol (ULTRAM) 50 MG Tab, , Disp: , Rfl:     celecoxib (CELEBREX) 200 MG Cap, , Disp: , Rfl:     pregabalin (LYRICA) 50 MG capsule, Take 1 Capsule by mouth 3 times a day for 180 days., Disp: 90 Capsule, Rfl: 5    Continuous Blood Gluc Sensor (FREESTYLE ANGEL 14 DAY SENSOR) [facility], FreeStyle Angel 14 Day Sensor kit  USE AS DIRECTED AND CHANGE SENSOR EVERY 14 DAYS, Disp: , Rfl:     glucose blood (CONTOUR NEXT TEST) strip, , Disp: , Rfl:     COMBIGAN  0.2-0.5 % Solution, , Disp: , Rfl:     Dulaglutide (TRULICITY) 4.5 MG/0.5ML Solution Pen-injector, 4.5mg, Disp: , Rfl:     pseudoephedrine-codeine-guaifenesin (MYTUSSIN DAC) 30- MG/5ML solution, Take 10 mL by mouth every four hours as needed for Cough for up to 5 days., Disp: 120 mL, Rfl: 0    menthol jose g (CEPACOL) 2 MG Lozenge, Take 1 Lozenge by mouth every four hours as needed (sore throat)., Disp: 24 Lozenge, Rfl: 1    ibuprofen (MOTRIN) 600 MG Tab, TAKE ONE TABLET BY MOUTH EVERY EIGHT HOURS AS NEEDED FOR moderate PAIN, Disp: 30 Tablet, Rfl: 0    PROBLEMS:  Patient Active Problem List    Diagnosis Date Noted    Carpal tunnel syndrome of left wrist 01/12/2022    Diabetic polyneuropathy associated with type 2 diabetes mellitus (HCC) 11/17/2021    Sciatica of right side without back pain 05/31/2018    Dyslipidemia 11/07/2017    Type 2 diabetes mellitus with obesity (HCC) 11/07/2017    Gastroesophageal reflux disease without esophagitis 11/07/2017    Obesity (BMI 30-39.9) 11/07/2017       REVIEW OF SYSTEMS:  Gen.:  No Nausea, Vomiting, fever, Chills.  Heart: No chest pain.  Lungs:  No shortness of Breath.  Psychological: Celso unusual Anxiety depression     PHYSICAL EXAM      Constitutional: Alert, cooperative, not in acute distress.  Cardiovascular:  Rate Rhythm is regular without murmurs rubs clicks.     Thorax & Lungs: Clear to auscultation, no wheezing, rhonchi, or rales  HENT: Normocephalic, Atraumatic.  Eyes: PERRLA, EOMI, Conjunctiva normal.   Neck: Trachia is midline no swelling of the thyroid.   Lymphatic: No lymphadenopathy noted.   Musculoskeletal: Patient with pain at the 2:00 also 11:00 positions of the right breast no palpable axillary lymphadenopathy bilaterally  Neurologic: Alert & oriented x 3, cranial nerves II through XII are intact, Normal motor function, Normal sensory function, No focal deficits noted.   Psychiatric: Affect normal, Judgment normal, Mood normal.     VITAL SIGNS:/70    "Pulse 76   Temp 36.4 °C (97.5 °F) (Temporal)   Resp 16   Ht 1.702 m (5' 7\")   Wt 101 kg (222 lb 14.2 oz)   SpO2 94%   BMI 34.91 kg/m²     Labs: Reviewed    Assessment:                                                     Plan:     1. Type 2 diabetes mellitus with obesity (HCC)  Referral written to endocrinology  - Referral to Endocrinology    2. Obesity (BMI 30.0-34.9)  [place] written endocrinology  - Referral to Endocrinology    3. Mass of upper inner quadrant of right breast  Ultrasound and diagnostic mammogram ordered of the right breast  - US-BREAST LIMITED-RIGHT; Future  - MA-DIAGNOSTIC MAMMO BILAT W/TOMOSYNTHESIS W/CAD; Future    4. Pneumonia due to infectious organism, unspecified laterality, unspecified part of lung  Chest x-ray ordered  - DX-CHEST-2 VIEWS; Future        "

## 2024-12-10 ENCOUNTER — TELEPHONE (OUTPATIENT)
Dept: MEDICAL GROUP | Facility: LAB | Age: 79
End: 2024-12-10
Payer: MEDICARE

## 2024-12-10 DIAGNOSIS — J06.9 UPPER RESPIRATORY TRACT INFECTION, UNSPECIFIED TYPE: ICD-10-CM

## 2024-12-10 RX ORDER — CODEINE PHOSPHATE AND GUAIFENESIN 10; 100 MG/5ML; MG/5ML
5 SOLUTION ORAL EVERY 4 HOURS PRN
Qty: 420 ML | Refills: 0 | Status: SHIPPED | OUTPATIENT
Start: 2024-12-10 | End: 2024-12-25

## 2024-12-10 NOTE — TELEPHONE ENCOUNTER
[facility] pharmacy called stating that they cannot fill the prescription for Mytussin Dac and are only able to fill codeine-guaifenesin without pseudoephedrine. He is requesting a new prescription is sent.

## 2024-12-10 NOTE — TELEPHONE ENCOUNTER
VOICEMAIL  1. Caller Name: Lucero Palm    Call Back Number: [phone] (home)     2. Message: Patient asking if her medication has been sent. Left [employer] message stating it was sent on Monday.

## 2024-12-11 ENCOUNTER — HOSPITAL ENCOUNTER (OUTPATIENT)
Dept: RADIOLOGY | Facility: MEDICAL CENTER | Age: 79
End: 2024-12-11
Attending: INTERNAL MEDICINE
Payer: MEDICARE

## 2024-12-11 DIAGNOSIS — N63.12 MASS OF UPPER INNER QUADRANT OF RIGHT BREAST: ICD-10-CM

## 2024-12-11 PROCEDURE — G0279 TOMOSYNTHESIS, MAMMO: HCPCS

## 2024-12-11 NOTE — TELEPHONE ENCOUNTER
Valery:  New prescription sent to patient's pharmacy for guaifenesin and codeine!  Regards, Jp Crocker, DO

## 2024-12-12 ENCOUNTER — TELEPHONE (OUTPATIENT)
Dept: MEDICAL GROUP | Facility: LAB | Age: 79
End: 2024-12-12
Payer: MEDICARE

## 2024-12-12 NOTE — TELEPHONE ENCOUNTER
[facility] pharmacy [facility] req to send a new prescription for   guaifenesin-codeine (ROBITUSSIN AC) Solution oral solution with a dispense quantity of 120 mL. They stated they can not dispense 420 mL.

## 2024-12-13 NOTE — TELEPHONE ENCOUNTER
Please disregard my previous message. [facility] pharmacy LVM stating they do not have guaifenesin-codeine (ROBITUSSIN AC) Solution oral solution and they cannot dispense it!     I called the pt and she stated she will call back with a different pharmacy name and address.

## 2025-01-10 DIAGNOSIS — R20.0 NUMBNESS AND TINGLING IN LEFT HAND: ICD-10-CM

## 2025-01-10 DIAGNOSIS — E11.69 TYPE 2 DIABETES MELLITUS WITH OBESITY (HCC): ICD-10-CM

## 2025-01-10 DIAGNOSIS — E66.9 TYPE 2 DIABETES MELLITUS WITH OBESITY (HCC): ICD-10-CM

## 2025-01-10 DIAGNOSIS — R20.2 NUMBNESS AND TINGLING IN LEFT HAND: ICD-10-CM

## 2025-01-12 RX ORDER — PREGABALIN 25 MG/1
25 CAPSULE ORAL 3 TIMES DAILY
Qty: 90 CAPSULE | Refills: 5 | Status: SHIPPED | OUTPATIENT
Start: 2025-01-12 | End: 2025-07-11

## 2025-01-13 RX ORDER — DULAGLUTIDE 4.5 MG/.5ML
INJECTION, SOLUTION SUBCUTANEOUS
Status: CANCELLED | OUTPATIENT
Start: 2025-01-13

## 2025-01-13 NOTE — TELEPHONE ENCOUNTER
Patient called and stated she has one more dose for this week but is going out of town next week and needs a refill before then.     Received request via: Patient    Was the patient seen in the last year in this department? Yes    Does the patient have an active prescription (recently filled or refills available) for medication(s) requested? No    [facility]    Does the patient have [facility] and need 100-day supply? (This applies to ALL medications) Patient does not have SCP

## 2025-01-14 RX ORDER — DULAGLUTIDE 4.5 MG/.5ML
4.5 INJECTION, SOLUTION SUBCUTANEOUS
Qty: 0.5 ML | Refills: 3 | Status: SHIPPED | OUTPATIENT
Start: 2025-01-14

## 2025-01-27 ENCOUNTER — TELEPHONE (OUTPATIENT)
Dept: MEDICAL GROUP | Facility: LAB | Age: 80
End: 2025-01-27
Payer: MEDICARE

## 2025-01-27 NOTE — TELEPHONE ENCOUNTER
Valery:  Randal Linda MD or Maribel Byers MD both are excellent cardiologists.   Regards, Jp Crocker, DO

## 2025-01-27 NOTE — TELEPHONE ENCOUNTER
Lucero called requesting a recommendation for her and her  [place] since their cardiologist with [facility] has moved.    Consent: Written consent obtained. Risks include but not limited to lid/brow ptosis, bruising, swelling, diplopia, temporary effect, incomplete chemical denervation.

## 2025-01-29 NOTE — PROGRESS NOTES
Chief Complaint:  Consult requested by Jp Crocker D.O. for initial evaluation of Type 2 Diabetes Mellitus    HPI:   Lucero Palm is a 79 y.o. female was referred to endocrinology service by PCP to establish care and T2DM management    DM history:  - diagnosed 10 + years ago, preceded by by prediabetes   - weight at the time of the diagnosis - 225 lb - > 240 lb  - hospitalizations for DKA/HHS/severe hyperglycemia/severe hypoglycemia in the past: none  - prior therapy: Metformin -> explosive diarrhea; on Trulicity  - known DM complications: peripheral neuropathy  - latest HbA1C  - 6.6 % (2/2025)  - pertinent PMHx:   -- obesity, dyslipidemia, GERD, sciatica, OA  -- denies NAFLD, HTN; CAD/PAD/CHF/ CVA     Current therapy:  Trulicity 4.5 mg    Tolerates medications: well  Compliant: yes    Prior used medications:   Metformin - explosive diarrhea    Other important medications:  Pregabalin     BG control:  CGM type - Nikko 2  Period - 1/22/25 - 2/4/25  Data were reviewed and discussed with the patient  Active time  - 15% !!!  ABG - 153 mg/dL  GV - 24.8%  GMI  - N/A  TIR - 80%  TAR - high - 20%, very high - 0%  TBR - 0%     Hypoglycemic episodes:  Hypoglycemic symptoms: NA  Hypoglycemic unawareness: NA  Treatment: NA  Severe hypoglycemia: NA  Has medical bracelet/necklace:NA  Has glucagon emergency kit: NA    Past Medical History:  Patient Active Problem List    Diagnosis Date Noted    Carpal tunnel syndrome of left wrist 01/12/2022    Diabetic polyneuropathy associated with type 2 diabetes mellitus (HCC) 11/17/2021    Sciatica of right side without back pain 05/31/2018    Dyslipidemia 11/07/2017    Type 2 diabetes mellitus with obesity (HCC) 11/07/2017    Gastroesophageal reflux disease without esophagitis 11/07/2017    Obesity (BMI 30-39.9) 11/07/2017     Past Surgical History:  Past Surgical History:   Procedure Laterality Date    LITHOTRIPSY  2015    OTHER  2011    D&C    INGUINAL HERNIA REPAIR Left  2009    OTHER  1972    back surgery      Allergies:  Patient has no known allergies.     Social History:  Social History     Socioeconomic History    Marital status:      Spouse name: Not on file    Number of children: Not on file    Years of education: Not on file    Highest education level: Not on file   Occupational History    Not on file   Tobacco Use    Smoking status: Never    Smokeless tobacco: Never   Vaping Use    Vaping status: Never Used   Substance and Sexual Activity    Alcohol use: Yes     Alcohol/week: 0.6 oz     Types: 1 Glasses of wine per week     Comment: 2-3 per month    Drug use: No    Sexual activity: Not Currently     Partners: Male   Other Topics Concern    Not on file   Social History Narrative    Not on file     Social Drivers of Health     Financial Resource Strain: Not on file   Food Insecurity: Not on file   Transportation Needs: Not on file   Physical Activity: Not on file   Stress: Not on file   Social Connections: Not on file   Intimate Partner Violence: Not on file   Housing Stability: Not on file      Family History:   Family History   Problem Relation Age of Onset    Breast Cancer Mother 42    Cancer Mother          at 42 of breast CA    Breast Cancer Sister     Heart Disease Neg Hx      Current Medications:  Current Outpatient Medications:     Dulaglutide (TRULICITY) 4.5 MG/0.5ML Solution Auto-injector, Inject 4.5 mg under the skin every 7 days., Disp: 0.5 mL, Rfl: 3    pregabalin (LYRICA) 25 MG Cap, Take 1 Capsule by mouth 3 times a day for 180 days. Please dispense 90 capsules each month for a duration of 6 months, Disp: 90 Capsule, Rfl: 5    traMADol (ULTRAM) 50 MG Tab, , Disp: , Rfl:     celecoxib (CELEBREX) 200 MG Cap, , Disp: , Rfl:     menthol jose g (CEPACOL) 2 MG Lozenge, Take 1 Lozenge by mouth every four hours as needed (sore throat)., Disp: 24 Lozenge, Rfl: 1    pregabalin (LYRICA) 50 MG capsule, Take 1 Capsule by mouth 3 times a day for 180 days., Disp: 90  "Capsule, Rfl: 5    ibuprofen (MOTRIN) 600 MG Tab, TAKE ONE TABLET BY MOUTH EVERY EIGHT HOURS AS NEEDED FOR moderate PAIN, Disp: 30 Tablet, Rfl: 0    Continuous Blood Gluc Sensor (FREESTYLE ANGEL 14 DAY SENSOR) [facility] [place] FreeStyle [place] 14 Day Sensor kit  USE AS DIRECTED AND CHANGE SENSOR EVERY 14 DAYS, Disp: , Rfl:     glucose blood (CONTOUR NEXT TEST) strip, , Disp: , Rfl:     COMBIGAN 0.2-0.5 % Solution, , Disp: , Rfl:     PHYSICAL EXAM:   Vital signs: BP (!) 152/84 (BP Location: Left arm, Patient Position: Sitting, BP Cuff Size: Adult)   Pulse 83   Ht 1.778 m (5' 10\")   Wt 99.3 kg (219 lb)   SpO2 97%   BMI 31.42 kg/m²   GENERAL: Well-developed, well-nourished  in no apparent distress.   EYE: No ocular and eyelid asymmetry, Anicteric sclerae,  PERRL, No exophthalmos or lidlag  HENT: Hearing grossly intact, Normocephalic, atraumatic.  NECK: Supple. Trachea midline.   CARDIOVASCULAR: Regular rate and rhythm.   LUNGS: No dyspnea  ABDOMEN: Soft, nondistended  EXTREMITIES: No clubbing, cyanosis, or edema.   NEUROLOGICAL: Cranial nerves II-XII are grossly intact No visible tremor with both outstretched hands  SKIN: No rashes, lesions. Turgor is normal.    Labs:  - reviewed  HbA1C/BG/Hb:   Reference Range  08/18/23  01/19/24  10/03/24  2/4/25   HbA1C 4.0 - 5.6 % 7.5 (H) 6.6 (H) 7.4 (H) 6.6 (H)      Reference Range  06/26/24    Hb 12.0 - 15.5 g/dL 14.5 (E)   Hct 35.0 - 47.0 % 43.4 (E)     C-peptide/glucose/T1DM Abs:  No data    Kidney function/MACR:   Reference Range  07/19/23 12/08/23    Creatinine 0.50 - 1.40 mg/dL  0.54   GFR (CKD-EPI) >60 mL/min/1.73 m 2  93   MACR 0 - 30 mg/g negative      LFTs/FIB-4:   Reference Range  12/08/23    Platelet Count 164 - 446 K/uL 236   AST(SGOT) 12 - 45 U/L 15   ALT(SGPT) 2 - 50 U/L 13   ALP 30 - 99 U/L 70     Lipid panel:   Reference Range  10/03/24    Triglycerides 0 - 149 mg/dL 153 (H)   HDL >=40 mg/dL 48   LDL <100 mg/dL 157 (H)     Hypothyroidism screening:   Reference Range  " 12/08/23    TSH 0.380 - 5.330 1.080       ASSESSMENT/PLAN:   # Type 2 diabetes with hyperglycemia without insulin therapy   - duration x 10+  - on GLP-1 agonist  - HbA1C - 7.4% (10/2024) -> 6.6 % (2/2025)     Microvascular complications: peripheral neuropathy, denies nephropathy, retinopathy  Macrovascular complications: denies CAD, CVA, PAD  Associated comorbidities: obesity, dyslipidemia, GERD, sciatica     DM complication screening:  Labs reviewed:  MACR - neg, last checked in 7/2023  Creat/GFR wnl, last checked in 12/2023  LDL -157 - not at target, last checked in  10/2024     Patient is taking statin: no  Patient is taking ASA: no  Patient is taking ACE/ARB: no      Last eye exam: 9/2024, has glaucoma  Last foot exam: today - 2/4/25 by me, abnormal monofilament test (big toes), pulse 2+, no pedal edema, no lesions or calluses, dry skin; reports tingling, numbness, on Lyrica     Home medications:  Trulcity 4.5 mg weekly     Discussion:  - congratulated patient with excellent glycemic control  - will transition from Trulicity -> Mounjaro mostly for weight management benefit   - transition from Nikko 2 ->3 for patient's convenience    Plan:  Discussed with the patient goals for DM management:  Fasting BG 90 - 130  2 hrs postprandial  - 180  HbA1C < 7.0%     Therapy adjustments:  - stop Trulicity  - start Mounjaro 7.5 mg weekly    3. Nikko 2 -> Nikko 3.  4. Given instructions for foot care  5. Obtain CBC, CMP, MACR.l    # Obesity class 1  - BMI - 31.42  - continue lifestyle modifications  - trans from GLP-1 to GLP-1/GIP agonist    # Dyslipidemia  - LDL is not at goal  Plan:  Start  Rosuvastatin 5 mg.   Repeat lipid panel    RTC: 2 mo    Total time (face-to-face and non-face-to face time): 60 minutes min - not including CGM download and review - discussion of diagnoses, treatment, prognosis, medical charts, lab, imaging, pathology review, documentation.     Plan reviewed with the patient and agreed with plan.   All questions answered to patient's satisfaction.  Thank you kindly for allowing me to participate in the diabetes care plan for this patient.    Federica Reilly MD    CC:   Jp Crocker D.O.

## 2025-02-04 ENCOUNTER — TELEPHONE (OUTPATIENT)
Dept: PHARMACY | Facility: MEDICAL CENTER | Age: 80
End: 2025-02-04
Payer: MEDICARE

## 2025-02-04 ENCOUNTER — OFFICE VISIT (OUTPATIENT)
Dept: ENDOCRINOLOGY | Facility: MEDICAL CENTER | Age: 80
End: 2025-02-04
Attending: STUDENT IN AN ORGANIZED HEALTH CARE EDUCATION/TRAINING PROGRAM
Payer: MEDICARE

## 2025-02-04 VITALS
HEART RATE: 83 BPM | OXYGEN SATURATION: 97 % | WEIGHT: 219 LBS | DIASTOLIC BLOOD PRESSURE: 84 MMHG | BODY MASS INDEX: 31.35 KG/M2 | HEIGHT: 70 IN | SYSTOLIC BLOOD PRESSURE: 152 MMHG

## 2025-02-04 DIAGNOSIS — E66.9 TYPE 2 DIABETES MELLITUS WITH OBESITY (HCC): ICD-10-CM

## 2025-02-04 DIAGNOSIS — E11.69 TYPE 2 DIABETES MELLITUS WITH OBESITY (HCC): ICD-10-CM

## 2025-02-04 DIAGNOSIS — E78.5 DYSLIPIDEMIA: ICD-10-CM

## 2025-02-04 PROCEDURE — 99213 OFFICE O/P EST LOW 20 MIN: CPT | Performed by: STUDENT IN AN ORGANIZED HEALTH CARE EDUCATION/TRAINING PROGRAM

## 2025-02-04 PROCEDURE — 95251 CONT GLUC MNTR ANALYSIS I&R: CPT | Performed by: STUDENT IN AN ORGANIZED HEALTH CARE EDUCATION/TRAINING PROGRAM

## 2025-02-04 PROCEDURE — 83036 HEMOGLOBIN GLYCOSYLATED A1C: CPT | Performed by: STUDENT IN AN ORGANIZED HEALTH CARE EDUCATION/TRAINING PROGRAM

## 2025-02-04 PROCEDURE — 95250 CONT GLUC MNTR PHYS/QHP EQP: CPT | Mod: 27 | Performed by: STUDENT IN AN ORGANIZED HEALTH CARE EDUCATION/TRAINING PROGRAM

## 2025-02-04 PROCEDURE — 99205 OFFICE O/P NEW HI 60 MIN: CPT | Performed by: STUDENT IN AN ORGANIZED HEALTH CARE EDUCATION/TRAINING PROGRAM

## 2025-02-04 RX ORDER — ROSUVASTATIN CALCIUM 5 MG/1
5 TABLET, COATED ORAL EVERY EVENING
Qty: 90 TABLET | Refills: 4 | Status: SHIPPED | OUTPATIENT
Start: 2025-02-04

## 2025-02-04 RX ORDER — ROSUVASTATIN CALCIUM 5 MG/1
5 TABLET, COATED ORAL EVERY EVENING
Qty: 100 TABLET | Refills: 3 | Status: SHIPPED
Start: 2025-02-04 | End: 2025-02-04

## 2025-02-04 RX ORDER — ACYCLOVIR 800 MG/1
1 TABLET ORAL
Qty: 6 EACH | Refills: 5 | Status: SHIPPED
Start: 2025-02-04 | End: 2025-02-04

## 2025-02-04 ASSESSMENT — FIBROSIS 4 INDEX: FIB4 SCORE: 1.89

## 2025-02-04 NOTE — TELEPHONE ENCOUNTER
Prior Authorization has been submitted via Cover My Meds: Key ([id number])    Will follow up in 24-48 business hours.   Received [employer] request via [employer]  for MOUNJARO 7.5 MG/0.5ML Solution Auto-injector. (Quantity:2ML, Day Supply:28)     Insurance: [id number]  Member ID:  [id number]  BIN: [id number]  PCN: [id number]  Group: [id number]     Ran Test claim via [place] & medication Rejects stating prior authorization is required.    Carmen Braswell, Pharmacy Liaison/ RX Coordinator

## 2025-02-05 RX ORDER — ACYCLOVIR 800 MG/1
1 TABLET ORAL
Qty: 6 EACH | Refills: 5 | Status: SHIPPED | OUTPATIENT
Start: 2025-02-05

## 2025-02-06 ENCOUNTER — TELEPHONE (OUTPATIENT)
Dept: ENDOCRINOLOGY | Facility: MEDICAL CENTER | Age: 80
End: 2025-02-06
Payer: MEDICARE

## 2025-02-06 NOTE — TELEPHONE ENCOUNTER
VOICEMAIL  1. Caller Name: Lucero                      Call Back Number: [phone]    2. Message: Patient is wanting to follow up on her Nikko 3 order from the medical supplier because she rec'd a call from the [facility] pharmacy for her supplies. They informed her it will cost $61, however she doesn't know the day supply.     3. Patient approves office to leave a detailed voicemail/MyChart message: N\A      Called and spoke with patient. Advised her that we cannot proceed with ordering her CGM supplies through a medical supplier as she is currently not on any insulins. Advised her to proceed with her pharmacy supplies. She confirmed her nikko 2 sensors costed more than what is currently being offered for the nikko 3.     Patient undestands and will be picking up her nikko 3 sensors through pharmacy.

## 2025-02-13 LAB
HBA1C MFR BLD: 6.6 % (ref ?–5.8)
POCT INT CON NEG: NEGATIVE
POCT INT CON POS: POSITIVE

## 2025-02-18 ENCOUNTER — TELEPHONE (OUTPATIENT)
Dept: ENDOCRINOLOGY | Facility: MEDICAL CENTER | Age: 80
End: 2025-02-18
Payer: MEDICARE

## 2025-02-18 NOTE — TELEPHONE ENCOUNTER
Patient called and left a voicemail stating that she is out of Trulicity please send it to the [facility] pharmacy on file.

## 2025-02-18 NOTE — TELEPHONE ENCOUNTER
Patient is calling to follow up on her Mounjaro prescription. It was approved and pending with Rx Coordinators. Transferred her to [facility].

## 2025-02-28 ENCOUNTER — APPOINTMENT (OUTPATIENT)
Dept: LAB | Facility: MEDICAL CENTER | Age: 80
End: 2025-02-28
Payer: MEDICARE

## 2025-02-28 ENCOUNTER — HOSPITAL ENCOUNTER (OUTPATIENT)
Dept: RADIOLOGY | Facility: MEDICAL CENTER | Age: 80
End: 2025-02-28
Attending: INTERNAL MEDICINE
Payer: MEDICARE

## 2025-02-28 DIAGNOSIS — Z78.0 MENOPAUSE: ICD-10-CM

## 2025-02-28 PROCEDURE — 77080 DXA BONE DENSITY AXIAL: CPT

## 2025-03-01 ENCOUNTER — RESULTS FOLLOW-UP (OUTPATIENT)
Dept: MEDICAL GROUP | Facility: LAB | Age: 80
End: 2025-03-01

## 2025-03-26 ENCOUNTER — TELEPHONE (OUTPATIENT)
Dept: ENDOCRINOLOGY | Facility: MEDICAL CENTER | Age: 80
End: 2025-03-26
Payer: MEDICARE

## 2025-03-26 NOTE — TELEPHONE ENCOUNTER
Called Pt and reminded them of their appt on 04/08/25 and let them know they have labs due prior to their appt  Pt said they would get their labs taken care of prior to their appt

## 2025-04-02 ENCOUNTER — TELEPHONE (OUTPATIENT)
Dept: ENDOCRINOLOGY | Facility: MEDICAL CENTER | Age: 80
End: 2025-04-02
Payer: MEDICARE

## 2025-04-04 ENCOUNTER — HOSPITAL ENCOUNTER (OUTPATIENT)
Dept: LAB | Facility: MEDICAL CENTER | Age: 80
End: 2025-04-04
Attending: STUDENT IN AN ORGANIZED HEALTH CARE EDUCATION/TRAINING PROGRAM
Payer: MEDICARE

## 2025-04-04 DIAGNOSIS — E78.5 DYSLIPIDEMIA: ICD-10-CM

## 2025-04-04 DIAGNOSIS — E11.69 TYPE 2 DIABETES MELLITUS WITH OBESITY (HCC): ICD-10-CM

## 2025-04-04 DIAGNOSIS — E66.9 TYPE 2 DIABETES MELLITUS WITH OBESITY (HCC): ICD-10-CM

## 2025-04-04 LAB
BASOPHILS # BLD AUTO: 0.9 % (ref 0–1.8)
BASOPHILS # BLD: 0.11 K/UL (ref 0–0.12)
EOSINOPHIL # BLD AUTO: 0.27 K/UL (ref 0–0.51)
EOSINOPHIL NFR BLD: 2.3 % (ref 0–6.9)
ERYTHROCYTE [DISTWIDTH] IN BLOOD BY AUTOMATED COUNT: 42.6 FL (ref 35.9–50)
HCT VFR BLD AUTO: 47.2 % (ref 37–47)
HGB BLD-MCNC: 15 G/DL (ref 12–16)
IMM GRANULOCYTES # BLD AUTO: 0.06 K/UL (ref 0–0.11)
IMM GRANULOCYTES NFR BLD AUTO: 0.5 % (ref 0–0.9)
LYMPHOCYTES # BLD AUTO: 2.77 K/UL (ref 1–4.8)
LYMPHOCYTES NFR BLD: 23.8 % (ref 22–41)
MCH RBC QN AUTO: 27.9 PG (ref 27–33)
MCHC RBC AUTO-ENTMCNC: 31.8 G/DL (ref 32.2–35.5)
MCV RBC AUTO: 87.7 FL (ref 81.4–97.8)
MONOCYTES # BLD AUTO: 0.97 K/UL (ref 0–0.85)
MONOCYTES NFR BLD AUTO: 8.3 % (ref 0–13.4)
NEUTROPHILS # BLD AUTO: 7.48 K/UL (ref 1.82–7.42)
NEUTROPHILS NFR BLD: 64.2 % (ref 44–72)
NRBC # BLD AUTO: 0 K/UL
NRBC BLD-RTO: 0 /100 WBC (ref 0–0.2)
PLATELET # BLD AUTO: 226 K/UL (ref 164–446)
PMV BLD AUTO: 10.1 FL (ref 9–12.9)
RBC # BLD AUTO: 5.38 M/UL (ref 4.2–5.4)
WBC # BLD AUTO: 11.7 K/UL (ref 4.8–10.8)

## 2025-04-04 PROCEDURE — 80061 LIPID PANEL: CPT

## 2025-04-04 PROCEDURE — 36415 COLL VENOUS BLD VENIPUNCTURE: CPT

## 2025-04-04 PROCEDURE — 82570 ASSAY OF URINE CREATININE: CPT

## 2025-04-04 PROCEDURE — 80053 COMPREHEN METABOLIC PANEL: CPT

## 2025-04-04 PROCEDURE — 85025 COMPLETE CBC W/AUTO DIFF WBC: CPT

## 2025-04-04 PROCEDURE — 82043 UR ALBUMIN QUANTITATIVE: CPT

## 2025-04-04 PROCEDURE — 84681 ASSAY OF C-PEPTIDE: CPT

## 2025-04-05 LAB
ALBUMIN SERPL BCP-MCNC: 3.7 G/DL (ref 3.2–4.9)
ALBUMIN/GLOB SERPL: 0.9 G/DL
ALP SERPL-CCNC: 80 U/L (ref 30–99)
ALT SERPL-CCNC: 21 U/L (ref 2–50)
ANION GAP SERPL CALC-SCNC: 17 MMOL/L (ref 7–16)
AST SERPL-CCNC: 27 U/L (ref 12–45)
BILIRUB SERPL-MCNC: 0.4 MG/DL (ref 0.1–1.5)
BUN SERPL-MCNC: 11 MG/DL (ref 8–22)
CALCIUM ALBUM COR SERPL-MCNC: 9.6 MG/DL (ref 8.5–10.5)
CALCIUM SERPL-MCNC: 9.4 MG/DL (ref 8.5–10.5)
CHLORIDE SERPL-SCNC: 102 MMOL/L (ref 96–112)
CHOLEST SERPL-MCNC: 132 MG/DL (ref 100–199)
CO2 SERPL-SCNC: 20 MMOL/L (ref 20–33)
CREAT SERPL-MCNC: 0.76 MG/DL (ref 0.5–1.4)
CREAT UR-MCNC: 83.2 MG/DL
GFR SERPLBLD CREATININE-BSD FMLA CKD-EPI: 78 ML/MIN/1.73 M 2
GLOBULIN SER CALC-MCNC: 4 G/DL (ref 1.9–3.5)
GLUCOSE SERPL-MCNC: 137 MG/DL (ref 65–99)
HDLC SERPL-MCNC: 36 MG/DL
LDLC SERPL CALC-MCNC: 71 MG/DL
MICROALBUMIN UR-MCNC: 2 MG/DL
MICROALBUMIN/CREAT UR: 24 MG/G (ref 0–30)
POTASSIUM SERPL-SCNC: 4.1 MMOL/L (ref 3.6–5.5)
PROT SERPL-MCNC: 7.7 G/DL (ref 6–8.2)
SODIUM SERPL-SCNC: 139 MMOL/L (ref 135–145)
TRIGL SERPL-MCNC: 125 MG/DL (ref 0–149)

## 2025-04-07 LAB — C PEPTIDE SERPL-MCNC: 4.4 NG/ML (ref 0.5–3.3)

## 2025-04-07 NOTE — PROGRESS NOTES
"Follow up Endocrinology Visit  Initial consult/last visit on: 2/4/25  Referred by:  Jp Crocker D.O.    Chief complaint:  Lucero Palm, 79 y.o., female, who is here for follow up for T2DM management    Interval history:   - overall doing well  - reports decreased appetite, weight loss, happy about it  - reports feeling thirsty  - first 3 days after starting Mounjaro had \"shaking\" but resolved and never came back   - reports productive cough with beige sputum x 2 mo; leg swelling  - reviewed most recent labs    Current therapy:  Mounjaro 7.5 mg weekly    Tolerates medications: well  Compliant: yes    Prior used medications:   Metformin - explosive diarrhea  Trulicity - no side effects    Other important medications:  Pregabalin     BG control:  CGM type - Nikko 2 -> Nikko 3  Period - 1/22/25 - 2/4/25 -> 3/26/25 - 4/8/25  Data were reviewed and discussed with the patient  Active time  - 15 -> 63% !!!  ABG - 153  -> 174 mg/dL  GV - 24.8 -> 25.3%  GMI  - N/A -> 7.5%  TIR - 80 -> 58%  TAR - high - 20 -> 37%, very high - 0 -> 5%  TBR - 0 -> 0%  3/26/25 - 4/8/25      DM history:  - diagnosed 10 + years ago, preceded by by prediabetes   - weight at the time of the diagnosis - 225 lb - > 240 lb  - hospitalizations for DKA/HHS/severe hyperglycemia/severe hypoglycemia in the past: none  - prior therapy: Metformin -> explosive diarrhea; on Trulicity  - known DM complications: peripheral neuropathy  - latest HbA1C  - 6.6 % (2/2025)  - pertinent PMHx:   -- obesity, dyslipidemia, GERD, sciatica, OA  -- denies NAFLD, HTN; CAD/PAD/CHF/ CVA     Hypoglycemic episodes:  Hypoglycemic symptoms: NA  Hypoglycemic unawareness: NA  Treatment: NA  Severe hypoglycemia: NA  Has medical bracelet/necklace:NA  Has glucagon emergency kit: NA    Current Medications:  Current Outpatient Medications:     Continuous Glucose Sensor (FREESTYLE NIKKO 3 SENSOR) Misc, 1 Each every 14 days., Disp: 6 Each, Rfl: 5    Tirzepatide (MOUNJARO) 7.5 " "MG/0.5ML Solution Auto-injector, Inject 7.5 mg under the skin every 7 days., Disp: 2 mL, Rfl: 11    rosuvastatin (CRESTOR) 5 MG Tab, Take 1 Tablet by mouth every evening., Disp: 90 Tablet, Rfl: 4    pregabalin (LYRICA) 25 MG Cap, Take 1 Capsule by mouth 3 times a day for 180 days. Please dispense 90 capsules each month for a duration of 6 months (Patient not taking: Reported on 2/4/2025), Disp: 90 Capsule, Rfl: 5    traMADol (ULTRAM) 50 MG Tab, , Disp: , Rfl:     celecoxib (CELEBREX) 200 MG Cap, , Disp: , Rfl:     menthol jose g (CEPACOL) 2 MG Lozenge, Take 1 Lozenge by mouth every four hours as needed (sore throat). (Patient not taking: Reported on 2/4/2025), Disp: 24 Lozenge, Rfl: 1    ibuprofen (MOTRIN) 600 MG Tab, TAKE ONE TABLET BY MOUTH EVERY EIGHT HOURS AS NEEDED FOR moderate PAIN, Disp: 30 Tablet, Rfl: 0    glucose blood (CONTOUR NEXT TEST) strip, , Disp: , Rfl:     COMBIGAN 0.2-0.5 % Solution, , Disp: , Rfl:     PHYSICAL EXAM:   Vital signs: /70   Pulse 81   Ht 1.778 m (5' 10\")   Wt 97.6 kg (215 lb 1.6 oz)   SpO2 93%   BMI 30.86 kg/m²   GENERAL: Well-developed, well-nourished  in no apparent distress.   EYE: No ocular and eyelid asymmetry, Anicteric sclerae,  PERRL, No exophthalmos or lidlag  HENT: Hearing grossly intact, Normocephalic, atraumatic.  NECK: Supple. Trachea midline.   CARDIOVASCULAR: Regular rate and rhythm.   LUNGS: No dyspnea. Breath sounds are slightly coarse bilaterally, without wheezes, rales, or rhonchi  ABDOMEN: Soft, nondistended  EXTREMITIES: No clubbing, cyanosis. B/l pedal/ankle edema, L > R  NEUROLOGICAL: Cranial nerves II-XII are grossly intact No visible tremor with both outstretched hands  SKIN: No rashes, lesions. Turgor is normal.    Labs:  - reviewed   CBC, CMP, MACR  HbA1C/BG/Hb:   Reference Range  08/18/23  01/19/24  10/03/24  2/4/25   HbA1C 4.0 - 5.6 % 7.5 (H) 6.6 (H) 7.4 (H) 6.6 (H)      Reference Range  06/26/24 4/4/25   Hb 12.0 - 15.5 g/dL 14.5 (E) 15.0   Hct " 35.0 - 47.0 % 43.4 (E) 47.2 (H)     C-peptide/glucose/T1DM Abs:   Reference Range  04/04/25    Glucose 65 - 99 mg/dL 137 (H)   C-peptide  4.4 (H)     Kidney function/MACR:   Reference Range  07/19/23 12/08/23 4/4/25   Creatinine 0.50 - 1.40 mg/dL  0.54 0.76   GFR (CKD-EPI) >60 mL/min/1.73 m 2  93 78   MACR 0 - 30 mg/g negative  24     LFTs/FIB-4:   Reference Range  12/08/23    Platelet Count 164 - 446 K/uL 236   AST(SGOT) 12 - 45 U/L 15   ALT(SGPT) 2 - 50 U/L 13   ALP 30 - 99 U/L 70     Lipid panel:   Reference Range  10/03/24  4/4/25   Triglycerides 0 - 149 mg/dL 153 (H) 125   HDL >=40 mg/dL 48  36 (H)   LDL <100 mg/dL 157 (H) 71     Hypothyroidism screening:   Reference Range  12/08/23    TSH 0.380 - 5.330 1.080     ASSESSMENT/PLAN:   # Type 2 diabetes with hyperglycemia without insulin therapy   - duration x 10+  - on GLP-1 agonist  - HbA1C - 7.4% (10/2024) -> 6.6 % (2/2025)  - has preserved insulin secretion - 4/2025 - glucose - 137, C-peptide - 4.4    Microvascular complications: peripheral neuropathy, denies nephropathy, retinopathy  Macrovascular complications: denies CAD, CVA, PAD  Associated comorbidities: obesity, dyslipidemia, GERD, sciatica     DM complication screening:  Labs reviewed:  MACR - neg, last checked in 7/2023  Creat/GFR wnl, last checked in 12/2023  LDL -157 - not at target, last checked in  10/2024     Patient is taking statin: no  Patient is taking ASA: no  Patient is taking ACE/ARB: no      Last eye exam: 9/2024, has glaucoma  Last foot exam: today - 2/4/25 by me, abnormal monofilament test (big toes), pulse 2+, no pedal edema, no lesions or calluses, dry skin; reports tingling, numbness, on Lyrica     Home medications:  Trulcity 4.5 mg weekly     Discussion:  - congratulated patient with preserved insulin secretion   - still has suboptimal glycemic control  - will futher up titrate GLP-1/GIP agonist as tolerated  Prior notes:  2/4/25  - congratulated patient with excellent glycemic  control  - will transition from Trulicity -> Mounjaro mostly for weight management benefit   - transition from Nikko 2 ->3 for patient's convenience    Plan:  Discussed with the patient goals for DM management:  Fasting BG 90 - 130  2 hrs postprandial  - 180  HbA1C < 7.0%     Therapy adjustments:  - increase Mounjaro 7.5 -> 10 mg weekly    -- if BG are not at goal -> we can up titrate dose by 2.5 mg/mo as tolerated and needed    3. Continue using Nikko 3.  4. Given instructions for foot care    # Obesity class 1  - BMI - 30.86   - congratulated with weight loss  - continue up titrate GLP-1/GIP agonist as tolerated  Prior notes:  2/4/25  - BMI - 31.42  - continue lifestyle modifications  - trans from GLP-1 to GLP-1/GIP agonist    # Dyslipidemia  -  LDL is at goal, congratulated the patient  Prior notes:  2/4/25  - LDL is not at goal  Plan:  Continue Rosuvastatin 5 mg.   Repeat lipid panel in 12 mo    # Chronic productive cough   - no focal crackles, wheezing, rhonchi, or diminished areas noted  - recommended to discuss with PCP    # B/l pedal edema, L > R  - likely due to venous insufficiency  - encouraged discussion with PCP    RTC: 3 mo    Total time (face-to-face and non-face-to face time): 47 minutes min - not including CGM download and review - discussion of diagnoses, treatment, prognosis, medical charts, lab, imaging, pathology review, documentation.     Plan reviewed with the patient and agreed with plan.  All questions answered to patient's satisfaction.  Thank you kindly for allowing me to participate in the diabetes care plan for this patient.    Federica Reilly MD

## 2025-04-08 ENCOUNTER — OFFICE VISIT (OUTPATIENT)
Dept: ENDOCRINOLOGY | Facility: MEDICAL CENTER | Age: 80
End: 2025-04-08
Attending: STUDENT IN AN ORGANIZED HEALTH CARE EDUCATION/TRAINING PROGRAM
Payer: MEDICARE

## 2025-04-08 VITALS
BODY MASS INDEX: 30.79 KG/M2 | HEIGHT: 70 IN | HEART RATE: 81 BPM | DIASTOLIC BLOOD PRESSURE: 70 MMHG | WEIGHT: 215.1 LBS | SYSTOLIC BLOOD PRESSURE: 114 MMHG | OXYGEN SATURATION: 93 %

## 2025-04-08 DIAGNOSIS — E11.69 TYPE 2 DIABETES MELLITUS WITH OBESITY (HCC): ICD-10-CM

## 2025-04-08 DIAGNOSIS — R60.0 PEDAL EDEMA: ICD-10-CM

## 2025-04-08 DIAGNOSIS — R05.3 CHRONIC COUGH: ICD-10-CM

## 2025-04-08 DIAGNOSIS — E66.9 TYPE 2 DIABETES MELLITUS WITH OBESITY (HCC): ICD-10-CM

## 2025-04-08 PROCEDURE — 95251 CONT GLUC MNTR ANALYSIS I&R: CPT | Performed by: STUDENT IN AN ORGANIZED HEALTH CARE EDUCATION/TRAINING PROGRAM

## 2025-04-08 PROCEDURE — 95250 CONT GLUC MNTR PHYS/QHP EQP: CPT | Performed by: STUDENT IN AN ORGANIZED HEALTH CARE EDUCATION/TRAINING PROGRAM

## 2025-04-08 PROCEDURE — 3078F DIAST BP <80 MM HG: CPT | Performed by: STUDENT IN AN ORGANIZED HEALTH CARE EDUCATION/TRAINING PROGRAM

## 2025-04-08 PROCEDURE — 3074F SYST BP LT 130 MM HG: CPT | Performed by: STUDENT IN AN ORGANIZED HEALTH CARE EDUCATION/TRAINING PROGRAM

## 2025-04-08 PROCEDURE — 99211 OFF/OP EST MAY X REQ PHY/QHP: CPT | Mod: 27 | Performed by: STUDENT IN AN ORGANIZED HEALTH CARE EDUCATION/TRAINING PROGRAM

## 2025-04-08 PROCEDURE — 99215 OFFICE O/P EST HI 40 MIN: CPT | Performed by: STUDENT IN AN ORGANIZED HEALTH CARE EDUCATION/TRAINING PROGRAM

## 2025-04-08 RX ORDER — TIRZEPATIDE 10 MG/.5ML
10 INJECTION, SOLUTION SUBCUTANEOUS
Qty: 2 ML | Refills: 11 | Status: SHIPPED | OUTPATIENT
Start: 2025-04-08

## 2025-04-08 ASSESSMENT — FIBROSIS 4 INDEX: FIB4 SCORE: 2.11

## 2025-04-29 ENCOUNTER — OFFICE VISIT (OUTPATIENT)
Dept: MEDICAL GROUP | Facility: LAB | Age: 80
End: 2025-04-29
Payer: MEDICARE

## 2025-04-29 VITALS
SYSTOLIC BLOOD PRESSURE: 120 MMHG | DIASTOLIC BLOOD PRESSURE: 60 MMHG | OXYGEN SATURATION: 94 % | TEMPERATURE: 97.7 F | HEART RATE: 80 BPM | WEIGHT: 210.76 LBS | RESPIRATION RATE: 16 BRPM | HEIGHT: 70 IN | BODY MASS INDEX: 30.17 KG/M2

## 2025-04-29 DIAGNOSIS — R77.1 ELEVATED SERUM GLOBULIN LEVEL: ICD-10-CM

## 2025-04-29 DIAGNOSIS — E66.9 TYPE 2 DIABETES MELLITUS WITH OBESITY (HCC): ICD-10-CM

## 2025-04-29 DIAGNOSIS — R05.3 CHRONIC COUGH: ICD-10-CM

## 2025-04-29 DIAGNOSIS — R79.89 ABNORMAL CBC: ICD-10-CM

## 2025-04-29 DIAGNOSIS — E11.69 TYPE 2 DIABETES MELLITUS WITH OBESITY (HCC): ICD-10-CM
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
## 2025-04-29 ASSESSMENT — FIBROSIS 4 INDEX: FIB4 SCORE: 2.11

## 2025-04-29 ASSESSMENT — PATIENT HEALTH QUESTIONNAIRE - PHQ9: CLINICAL INTERPRETATION OF PHQ2 SCORE: 0
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
## 2025-05-01 ENCOUNTER — TELEPHONE (OUTPATIENT)
Dept: ENDOCRINOLOGY | Facility: MEDICAL CENTER | Age: 80
End: 2025-05-01
Payer: MEDICARE

## 2025-05-01 NOTE — TELEPHONE ENCOUNTER
PT called asking for a new prescription for Nikko 3 Plus sensor    Medication Requested: Nikko 3 Plus Sensor      Insulin pen Or vial? : N/A        Days Supply: 90        Pharmacy: [id number]        Number of Refills: 3

## 2025-05-05 DIAGNOSIS — E11.69 TYPE 2 DIABETES MELLITUS WITH OBESITY (HCC): ICD-10-CM

## 2025-05-05 DIAGNOSIS — E66.9 TYPE 2 DIABETES MELLITUS WITH OBESITY (HCC): ICD-10-CM

## 2025-05-05 RX ORDER — HYDROCHLOROTHIAZIDE 12.5 MG/1
CAPSULE ORAL
Qty: 6 EACH | Refills: 4 | Status: SHIPPED | OUTPATIENT
Start: 2025-05-05

## 2025-05-21 ENCOUNTER — TELEPHONE (OUTPATIENT)
Dept: ENDOCRINOLOGY | Facility: MEDICAL CENTER | Age: 80
End: 2025-05-21
Payer: MEDICARE

## 2025-05-21 NOTE — TELEPHONE ENCOUNTER
DOCUMENTATION OF PAR STATUS: PA request rec'd via fax (Key: [id number])    1. Name of Medication & Dose: FreeStyle Nikko 3 Plus Sensor     2. Name of Prescription Coverage Company & phone #: [employer] Part D [employer]    3. Date Prior Auth Submitted: 05/21/2025    4. What information was given to obtain insurance decision? Recent chart notes    5. Prior Auth Status? Pending    6. Patient Notified: N\A

## 2025-05-30 ENCOUNTER — APPOINTMENT (OUTPATIENT)
Dept: URGENT CARE | Facility: CLINIC | Age: 80
End: 2025-05-30
Payer: MEDICARE

## 2025-05-30 ENCOUNTER — APPOINTMENT (OUTPATIENT)
Dept: RADIOLOGY | Facility: IMAGING CENTER | Age: 80
End: 2025-05-30
Attending: INTERNAL MEDICINE
Payer: MEDICARE

## 2025-05-30 DIAGNOSIS — R05.3 CHRONIC COUGH: ICD-10-CM

## 2025-05-30 PROCEDURE — 71046 X-RAY EXAM CHEST 2 VIEWS: CPT | Mod: TC | Performed by: RADIOLOGY

## 2025-07-27 NOTE — PROGRESS NOTES
"Follow up Endocrinology Visit  Initial consult on: 2/4/25  Last visit on: 4/8/25  Referred by:  Jp Crocker D.O.    Chief complaint:  Lucero Palm, 79 y.o., female, who is here for follow up for T2DM management    Interval history:   -   Prior notes:  4/8/25  - overall doing well  - reports decreased appetite, weight loss, happy about it  - reports feeling thirsty  - first 3 days after starting Mounjaro had \"shaking\" but resolved and never came back   - reports productive cough with beige sputum x 2 mo; leg swelling  - reviewed most recent labs    Current therapy:  Mounjaro 7.5 mg weekly    - increase Mounjaro 7.5 -> 10 mg weekly    -- if BG are not at goal -> we can up titrate dose by 2.5 mg/mo as tolerated and needed    Tolerates medications: well  Compliant: yes    Prior used medications:   Metformin - explosive diarrhea  Trulicity - no side effects    Other important medications:  Pregabalin     BG control:  CGM type - Nikko 2 -> Nikko 3  Period - 1/22/25 - 2/4/25 -> 3/26/25 - 4/8/25  Data were reviewed and discussed with the patient  Active time  - 15 -> 63% !!!  ABG - 153  -> 174 mg/dL  GV - 24.8 -> 25.3%  GMI  - N/A -> 7.5%  TIR - 80 -> 58%  TAR - high - 20 -> 37%, very high - 0 -> 5%  TBR - 0 -> 0%  3/26/25 - 4/8/25      DM history:  - diagnosed 10 + years ago, preceded by by prediabetes   - weight at the time of the diagnosis - 225 lb - > 240 lb  - hospitalizations for DKA/HHS/severe hyperglycemia/severe hypoglycemia in the past: none  - prior therapy: Metformin -> explosive diarrhea; on Trulicity  - known DM complications: peripheral neuropathy  - latest HbA1C  - 6.6 % (2/2025)  - pertinent PMHx:   -- obesity, dyslipidemia, GERD, sciatica, OA  -- denies NAFLD, HTN; CAD/PAD/CHF/ CVA     Hypoglycemic episodes:  Hypoglycemic symptoms: NA  Hypoglycemic unawareness: NA  Treatment: NA  Severe hypoglycemia: NA  Has medical bracelet/necklace:NA  Has glucagon emergency kit: NA    Current " Medications:  Current Outpatient Medications:     Continuous Glucose Sensor (FREESTYLE ANGEL 3 PLUS SENSOR) Misc, Apply every 15 days, Disp: 6 Each, Rfl: 4    Tirzepatide (MOUNJARO) 10 MG/0.5ML Solution Auto-injector, Inject 10 mg under the skin every 7 days., Disp: 2 mL, Rfl: 11    rosuvastatin (CRESTOR) 5 MG Tab, Take 1 Tablet by mouth every evening., Disp: 90 Tablet, Rfl: 4    traMADol (ULTRAM) 50 MG Tab, , Disp: , Rfl:     celecoxib (CELEBREX) 200 MG Cap, , Disp: , Rfl:     menthol jose g (CEPACOL) 2 MG Lozenge, Take 1 Lozenge by mouth every four hours as needed (sore throat). (Patient not taking: Reported on 2/4/2025), Disp: 24 Lozenge, Rfl: 1    ibuprofen (MOTRIN) 600 MG Tab, TAKE ONE TABLET BY MOUTH EVERY EIGHT HOURS AS NEEDED FOR moderate PAIN, Disp: 30 Tablet, Rfl: 0    glucose blood (CONTOUR NEXT TEST) strip, , Disp: , Rfl:     COMBIGAN 0.2-0.5 % Solution, , Disp: , Rfl:     PHYSICAL EXAM:   Vital signs: There were no vitals taken for this visit.  GENERAL: Well-developed, well-nourished  in no apparent distress.   EYE: No ocular and eyelid asymmetry, Anicteric sclerae,  PERRL, No exophthalmos or lidlag  HENT: Hearing grossly intact, Normocephalic, atraumatic.  NECK: Supple. Trachea midline.   CARDIOVASCULAR: Regular rate and rhythm.   LUNGS: No dyspnea. Breath sounds are slightly coarse bilaterally, without wheezes, rales, or rhonchi  ABDOMEN: Soft, nondistended  EXTREMITIES: No clubbing, cyanosis. B/l pedal/ankle edema, L > R  NEUROLOGICAL: Cranial nerves II-XII are grossly intact No visible tremor with both outstretched hands  SKIN: No rashes, lesions. Turgor is normal.    Labs:  - reviewed   CBC, CMP, MACR  HbA1C/BG/Hb:   Reference Range  10/03/24  2/4/25    HbA1C 4.0 - 5.6 % 7.4 (H) 6.6 (H)       Reference Range  06/26/24 4/4/25   Hb 12.0 - 15.5 g/dL 14.5 (E) 15.0   Hct 35.0 - 47.0 % 43.4 (E) 47.2 (H)     C-peptide/glucose/T1DM Abs:   Reference Range  04/04/25    Glucose 65 - 99 mg/dL 137 (H)   C-peptide   4.4 (H)     Kidney function/MACR:   Reference Range  07/19/23 12/08/23 4/4/25   Creatinine 0.50 - 1.40 mg/dL  0.54 0.76   GFR (CKD-EPI) >60 mL/min/1.73 m 2  93 78   MACR 0 - 30 mg/g negative  24     LFTs/FIB-4:   Reference Range  12/08/23    Platelet Count 164 - 446 K/uL 236   AST(SGOT) 12 - 45 U/L 15   ALT(SGPT) 2 - 50 U/L 13   ALP 30 - 99 U/L 70     Lipid panel:   Reference Range  10/03/24  4/4/25   Triglycerides 0 - 149 mg/dL 153 (H) 125   HDL >=40 mg/dL 48  36 (H)   LDL <100 mg/dL 157 (H) 71     Hypothyroidism screening:   Reference Range  12/08/23    TSH 0.380 - 5.330 1.080     ASSESSMENT/PLAN:   # Type 2 diabetes with hyperglycemia without insulin therapy   - duration x 10+  - on GLP-1 agonist  - HbA1C - 7.4% (10/2024) -> 6.6 % (2/2025)  - has preserved insulin secretion - 4/2025 - glucose - 137, C-peptide - 4.4    Microvascular complications: peripheral neuropathy, denies nephropathy, retinopathy  Macrovascular complications: denies CAD, CVA, PAD  Associated comorbidities: obesity, dyslipidemia, GERD, sciatica     DM complication screening:  Labs reviewed:  MACR - neg, last checked in 7/2023  Creat/GFR wnl, last checked in 12/2023  LDL -157 - not at target, last checked in  10/2024     Patient is taking statin: no  Patient is taking ASA: no  Patient is taking ACE/ARB: no      Last eye exam: 9/2024, has glaucoma  Last foot exam: today - 2/4/25 by me, abnormal monofilament test (big toes), pulse 2+, no pedal edema, no lesions or calluses, dry skin; reports tingling, numbness, on Lyrica     Home medications:  Trulcity 4.5 mg weekly     Discussion:  -  Prior notes:  2/4/25  - congratulated patient with excellent glycemic control  - will transition from Trulicity -> Mounjaro mostly for weight management benefit   - transition from Nikko 2 ->3 for patient's convenience  4/8/25  - congratulated patient with preserved insulin secretion   - still has suboptimal glycemic control  - will futher up titrate GLP-1/GIP  agonist as tolerated    Plan:  Discussed with the patient goals for DM management:  Fasting BG 90 - 130  2 hrs postprandial  - 180  HbA1C < 7.0%     Therapy adjustments:  - increase Mounjaro 7.5 -> 10 mg weekly    -- if BG are not at goal -> we can up titrate dose by 2.5 mg/mo as tolerated and needed    3. Continue using Nikko 3.  4. Given instructions for foot care    # Obesity class 1  -  Prior notes:  2/4/25  - BMI - 31.42  - continue lifestyle modifications  - trans from GLP-1 to GLP-1/GIP agonist  4/8/25  - BMI - 30.86   - congratulated with weight loss  - continue up titrate GLP-1/GIP agonist as tolerated    # Dyslipidemia  -  Prior notes:  2/4/25  - LDL is not at goal  4/8/25  -  LDL is at goal, congratulated the patient  Plan:  Continue Rosuvastatin 5 mg.   Repeat lipid panel in 12 mo    # Chronic productive cough   -   Prior notes:  4/8/25  - no focal crackles, wheezing, rhonchi, or diminished areas noted  - recommended to discuss with PCP    # B/l pedal edema, L > R  -   Prior notes:  4/8/25  - likely due to venous insufficiency  - encouraged discussion with PCP    RTC: 3 mo    Total time (face-to-face and non-face-to face time): 47 minutes min - not including CGM download and review - discussion of diagnoses, treatment, prognosis, medical charts, lab, imaging, pathology review, documentation.     Plan reviewed with the patient and agreed with plan.  All questions answered to patient's satisfaction.  Thank you kindly for allowing me to participate in the diabetes care plan for this patient.    Federica Reilly MD

## 2025-07-29 ENCOUNTER — APPOINTMENT (OUTPATIENT)
Dept: ENDOCRINOLOGY | Facility: MEDICAL CENTER | Age: 80
End: 2025-07-29
Attending: STUDENT IN AN ORGANIZED HEALTH CARE EDUCATION/TRAINING PROGRAM
Payer: MEDICARE

## 2025-08-05 ENCOUNTER — APPOINTMENT (OUTPATIENT)
Dept: MEDICAL GROUP | Facility: LAB | Age: 80
End: 2025-08-05
Payer: MEDICARE

## 2025-08-07 ENCOUNTER — TELEPHONE (OUTPATIENT)
Dept: MEDICAL GROUP | Facility: LAB | Age: 80
End: 2025-08-07
Payer: MEDICARE

## 2025-08-14 ENCOUNTER — HOSPITAL ENCOUNTER (OUTPATIENT)
Dept: LAB | Facility: MEDICAL CENTER | Age: 80
End: 2025-08-14
Attending: INTERNAL MEDICINE
Payer: MEDICARE

## 2025-08-14 ENCOUNTER — OFFICE VISIT (OUTPATIENT)
Dept: MEDICAL GROUP | Facility: LAB | Age: 80
End: 2025-08-14
Payer: MEDICARE

## 2025-08-14 VITALS
DIASTOLIC BLOOD PRESSURE: 66 MMHG | RESPIRATION RATE: 16 BRPM | HEART RATE: 78 BPM | OXYGEN SATURATION: 93 % | SYSTOLIC BLOOD PRESSURE: 136 MMHG | TEMPERATURE: 97.2 F | WEIGHT: 198.63 LBS | BODY MASS INDEX: 28.44 KG/M2 | HEIGHT: 70 IN

## 2025-08-14 DIAGNOSIS — R10.31 RIGHT LOWER QUADRANT PAIN: ICD-10-CM

## 2025-08-14 DIAGNOSIS — E66.9 TYPE 2 DIABETES MELLITUS WITH OBESITY (HCC): Primary | ICD-10-CM

## 2025-08-14 DIAGNOSIS — G89.29 CHRONIC BILATERAL LOW BACK PAIN, UNSPECIFIED WHETHER SCIATICA PRESENT: ICD-10-CM

## 2025-08-14 DIAGNOSIS — E11.69 TYPE 2 DIABETES MELLITUS WITH OBESITY (HCC): Primary | ICD-10-CM

## 2025-08-14 DIAGNOSIS — M54.50 CHRONIC BILATERAL LOW BACK PAIN, UNSPECIFIED WHETHER SCIATICA PRESENT: ICD-10-CM

## 2025-08-14 LAB
ALBUMIN SERPL BCP-MCNC: 3.5 G/DL (ref 3.2–4.9)
ALBUMIN/GLOB SERPL: 1 G/DL
ALP SERPL-CCNC: 63 U/L (ref 30–99)
ALT SERPL-CCNC: 15 U/L (ref 2–50)
ANION GAP SERPL CALC-SCNC: 10 MMOL/L (ref 7–16)
AST SERPL-CCNC: 20 U/L (ref 12–45)
BASOPHILS # BLD AUTO: 0.6 % (ref 0–1.8)
BASOPHILS # BLD: 0.06 K/UL (ref 0–0.12)
BILIRUB SERPL-MCNC: 0.4 MG/DL (ref 0.1–1.5)
BUN SERPL-MCNC: 19 MG/DL (ref 8–22)
CALCIUM ALBUM COR SERPL-MCNC: 9.9 MG/DL (ref 8.5–10.5)
CALCIUM SERPL-MCNC: 9.5 MG/DL (ref 8.5–10.5)
CHLORIDE SERPL-SCNC: 104 MMOL/L (ref 96–112)
CO2 SERPL-SCNC: 24 MMOL/L (ref 20–33)
CREAT SERPL-MCNC: 0.65 MG/DL (ref 0.5–1.4)
EOSINOPHIL # BLD AUTO: 0.14 K/UL (ref 0–0.51)
EOSINOPHIL NFR BLD: 1.3 % (ref 0–6.9)
ERYTHROCYTE [DISTWIDTH] IN BLOOD BY AUTOMATED COUNT: 46.5 FL (ref 35.9–50)
GFR SERPLBLD CREATININE-BSD FMLA CKD-EPI: 88 ML/MIN/1.73 M 2
GLOBULIN SER CALC-MCNC: 3.5 G/DL (ref 1.9–3.5)
GLUCOSE SERPL-MCNC: 100 MG/DL (ref 65–99)
HCT VFR BLD AUTO: 43.4 % (ref 37–47)
HGB BLD-MCNC: 13.6 G/DL (ref 12–16)
IMM GRANULOCYTES # BLD AUTO: 0.02 K/UL (ref 0–0.11)
IMM GRANULOCYTES NFR BLD AUTO: 0.2 % (ref 0–0.9)
LYMPHOCYTES # BLD AUTO: 2.67 K/UL (ref 1–4.8)
LYMPHOCYTES NFR BLD: 24.7 % (ref 22–41)
MCH RBC QN AUTO: 27.6 PG (ref 27–33)
MCHC RBC AUTO-ENTMCNC: 31.3 G/DL (ref 32.2–35.5)
MCV RBC AUTO: 88 FL (ref 81.4–97.8)
MONOCYTES # BLD AUTO: 0.99 K/UL (ref 0–0.85)
MONOCYTES NFR BLD AUTO: 9.2 % (ref 0–13.4)
NEUTROPHILS # BLD AUTO: 6.92 K/UL (ref 1.82–7.42)
NEUTROPHILS NFR BLD: 64 % (ref 44–72)
NRBC # BLD AUTO: 0 K/UL
NRBC BLD-RTO: 0 /100 WBC (ref 0–0.2)
PLATELET # BLD AUTO: 211 K/UL (ref 164–446)
PMV BLD AUTO: 9.6 FL (ref 9–12.9)
POTASSIUM SERPL-SCNC: 4 MMOL/L (ref 3.6–5.5)
PROT SERPL-MCNC: 7 G/DL (ref 6–8.2)
RBC # BLD AUTO: 4.93 M/UL (ref 4.2–5.4)
SODIUM SERPL-SCNC: 138 MMOL/L (ref 135–145)
WBC # BLD AUTO: 10.8 K/UL (ref 4.8–10.8)

## 2025-08-14 PROCEDURE — 99214 OFFICE O/P EST MOD 30 MIN: CPT | Performed by: INTERNAL MEDICINE

## 2025-08-14 PROCEDURE — 80053 COMPREHEN METABOLIC PANEL: CPT

## 2025-08-14 PROCEDURE — 85025 COMPLETE CBC W/AUTO DIFF WBC: CPT

## 2025-08-14 PROCEDURE — 3078F DIAST BP <80 MM HG: CPT | Performed by: INTERNAL MEDICINE

## 2025-08-14 PROCEDURE — 36415 COLL VENOUS BLD VENIPUNCTURE: CPT

## 2025-08-14 PROCEDURE — 3075F SYST BP GE 130 - 139MM HG: CPT | Performed by: INTERNAL MEDICINE

## 2025-08-14 ASSESSMENT — FIBROSIS 4 INDEX: FIB4 SCORE: 2.11
